# Patient Record
Sex: FEMALE | Race: WHITE | NOT HISPANIC OR LATINO | URBAN - METROPOLITAN AREA
[De-identification: names, ages, dates, MRNs, and addresses within clinical notes are randomized per-mention and may not be internally consistent; named-entity substitution may affect disease eponyms.]

---

## 2020-12-04 ENCOUNTER — NURSE TRIAGE (OUTPATIENT)
Dept: OTHER | Facility: OTHER | Age: 25
End: 2020-12-04

## 2020-12-04 DIAGNOSIS — Z20.822 SUSPECTED COVID-19 VIRUS INFECTION: Primary | ICD-10-CM

## 2020-12-04 DIAGNOSIS — Z20.822 SUSPECTED COVID-19 VIRUS INFECTION: ICD-10-CM

## 2020-12-04 PROCEDURE — U0003 INFECTIOUS AGENT DETECTION BY NUCLEIC ACID (DNA OR RNA); SEVERE ACUTE RESPIRATORY SYNDROME CORONAVIRUS 2 (SARS-COV-2) (CORONAVIRUS DISEASE [COVID-19]), AMPLIFIED PROBE TECHNIQUE, MAKING USE OF HIGH THROUGHPUT TECHNOLOGIES AS DESCRIBED BY CMS-2020-01-R: HCPCS | Performed by: FAMILY MEDICINE

## 2020-12-05 LAB — SARS-COV-2 RNA SPEC QL NAA+PROBE: NOT DETECTED

## 2022-03-10 LAB
EXTERNAL HEPATITIS B SURFACE ANTIGEN: NEGATIVE
EXTERNAL HIV-1/2 AB-AG: NORMAL
EXTERNAL RUBELLA IGG QUANTITATION: NORMAL
EXTERNAL SYPHILIS RPR SCREEN: NORMAL

## 2022-04-26 ENCOUNTER — OFFICE VISIT (OUTPATIENT)
Dept: PODIATRY | Facility: CLINIC | Age: 27
End: 2022-04-26
Payer: COMMERCIAL

## 2022-04-26 VITALS — HEART RATE: 67 BPM | SYSTOLIC BLOOD PRESSURE: 120 MMHG | WEIGHT: 214 LBS | DIASTOLIC BLOOD PRESSURE: 75 MMHG

## 2022-04-26 DIAGNOSIS — M79.671 RIGHT FOOT PAIN: ICD-10-CM

## 2022-04-26 DIAGNOSIS — M72.2 PLANTAR FASCIITIS: Primary | ICD-10-CM

## 2022-04-26 PROCEDURE — 99202 OFFICE O/P NEW SF 15 MIN: CPT | Performed by: PODIATRIST

## 2022-04-26 NOTE — PROGRESS NOTES
Assessment/Plan:    Explained the patient that her symptoms are most consistent with plantar fasciitis of the right foot  Discussed treatment options in light of her pregnancy  Advised her to refrain from walking barefoot  Stretching exercises of the right foot recommended  Dispensed heel supports  If pain persists, she will call and consider physical therapy  No problem-specific Assessment & Plan notes found for this encounter  Diagnoses and all orders for this visit:    Plantar fasciitis    Right foot pain    Other orders  -     Prenatal Vit-Fe Fumarate-FA (PRENATAL VITAMIN PO); Take by mouth          Subjective:      Patient ID: Iris Dykes is a 27 y o  female  HPI     Patient, a 74-year-old female presents with right heel pain  Patient is 15 weeks pregnant  Approximately 4 weeks ago, when patient for scheduled she states that her pain was severe  Now, she rates it a 3/10  She states that she is not on her feet as much as she had been attributing the decrease in pain to activity  The following portions of the patient's history were reviewed and updated as appropriate: allergies, current medications, past family history, past medical history, past social history, past surgical history and problem list     Review of Systems   Constitutional:        Patient is pregnant   Gastrointestinal: Negative  Musculoskeletal: Negative  Psychiatric/Behavioral: Negative  Objective:      /75   Pulse 67   Wt 97 1 kg (214 lb)          Physical Exam  Constitutional:       Appearance: Normal appearance  Cardiovascular:      Pulses: Normal pulses  Musculoskeletal:         General: Tenderness present  Comments: Pain with palpation medial aspect right heel at fascia insertion into calcaneus  No pain with side to side pressure right heel  Skin:     General: Skin is warm  Neurological:      General: No focal deficit present        Mental Status: She is oriented to person, place, and time

## 2022-05-06 ENCOUNTER — INITIAL PRENATAL (OUTPATIENT)
Dept: OBGYN CLINIC | Facility: CLINIC | Age: 27
End: 2022-05-06

## 2022-05-06 VITALS
BODY MASS INDEX: 36.15 KG/M2 | WEIGHT: 217 LBS | DIASTOLIC BLOOD PRESSURE: 78 MMHG | HEIGHT: 65 IN | SYSTOLIC BLOOD PRESSURE: 118 MMHG

## 2022-05-06 DIAGNOSIS — Z13.71 GENETIC DISEASE CARRIER STATUS TESTING, FEMALE: Primary | ICD-10-CM

## 2022-05-06 DIAGNOSIS — Z3A.16 16 WEEKS GESTATION OF PREGNANCY: ICD-10-CM

## 2022-05-06 DIAGNOSIS — G44.229 CHRONIC TENSION-TYPE HEADACHE, NOT INTRACTABLE: ICD-10-CM

## 2022-05-06 PROCEDURE — G0145 SCR C/V CYTO,THINLAYER,RESCR: HCPCS | Performed by: PHYSICIAN ASSISTANT

## 2022-05-06 PROCEDURE — 87491 CHLMYD TRACH DNA AMP PROBE: CPT | Performed by: PHYSICIAN ASSISTANT

## 2022-05-06 PROCEDURE — 87591 N.GONORRHOEAE DNA AMP PROB: CPT | Performed by: PHYSICIAN ASSISTANT

## 2022-05-06 PROCEDURE — PNV: Performed by: PHYSICIAN ASSISTANT

## 2022-05-06 RX ORDER — BUTALBITAL, ACETAMINOPHEN AND CAFFEINE 50; 325; 40 MG/1; MG/1; MG/1
1 TABLET ORAL EVERY 6 HOURS PRN
Qty: 15 TABLET | Refills: 0 | Status: SHIPPED | OUTPATIENT
Start: 2022-05-06

## 2022-05-06 NOTE — PROGRESS NOTES
NOB: Transfer from [facility], did not have physical exam done yet  PAP & Cx done, consents signed, Had negative NIPT  Information for [facility] given to schedule 20 wk ultrasound, patient may chose to stay with [facility]  Reports occasional nausea and vomiting  Reports headaches, had chronic headaches prior to pregnancy  Reports has been worsening in the last two weeks  Has had persistent headache for the last two weeks  Denies visual changes  Takes Tylenol, helps minimally  Reviewed tylenol, rest, hydration, magnsium, riboflavin  Offered Fioricet, aware to not take more than twice a week to avoid risk of rebound headaches  Patient would like script just in case  Reports mild cramping, round ligament in nature  No FM, VB, LOF, edema, domestic violence, or smoking  Tolerating PNV  Reviewed ob lab results with patient  BFA done today, patient is planning on breastfeeding  Continue routine prenatal care  Return to office in 4 weeks for ob check

## 2022-05-07 LAB
C TRACH DNA SPEC QL NAA+PROBE: NEGATIVE
N GONORRHOEA DNA SPEC QL NAA+PROBE: NEGATIVE

## 2022-05-14 LAB
LAB AP GYN PRIMARY INTERPRETATION: NORMAL
Lab: NORMAL

## 2022-06-08 ENCOUNTER — ROUTINE PRENATAL (OUTPATIENT)
Dept: OBGYN CLINIC | Facility: CLINIC | Age: 27
End: 2022-06-08

## 2022-06-08 ENCOUNTER — TELEPHONE (OUTPATIENT)
Dept: OBGYN CLINIC | Facility: CLINIC | Age: 27
End: 2022-06-08

## 2022-06-08 VITALS
HEIGHT: 65 IN | DIASTOLIC BLOOD PRESSURE: 78 MMHG | BODY MASS INDEX: 36.89 KG/M2 | SYSTOLIC BLOOD PRESSURE: 122 MMHG | WEIGHT: 221.4 LBS

## 2022-06-08 DIAGNOSIS — Z34.92 SECOND TRIMESTER PREGNANCY: Primary | ICD-10-CM

## 2022-06-08 PROCEDURE — PNV: Performed by: PHYSICIAN ASSISTANT

## 2022-06-08 NOTE — PROGRESS NOTES
Pt feels well  + Fm, no vb/lof, no n/v, no edema, no smoking, occasional headaches  Pt had normal anatomy scan although some cranial views missed-plans f/u w [facility] and 34 week growth scan as well  Declines desire for AFP testing  Pt states she has previously had seizure activity after blood draws  Will plan 28 weeks labs brunner diabetes screening     Urine neg/neg

## 2022-06-16 NOTE — TELEPHONE ENCOUNTER
R/s to 7/15  Patient unable to do 7/13 because she works until 10pm that day  Apologized to patient as I was unaware  Moved patient to a spot that should be cleared prior to apt

## 2022-07-15 ENCOUNTER — ROUTINE PRENATAL (OUTPATIENT)
Dept: OBGYN CLINIC | Facility: CLINIC | Age: 27
End: 2022-07-15

## 2022-07-15 VITALS
SYSTOLIC BLOOD PRESSURE: 134 MMHG | DIASTOLIC BLOOD PRESSURE: 72 MMHG | HEIGHT: 65 IN | BODY MASS INDEX: 37.82 KG/M2 | WEIGHT: 227 LBS

## 2022-07-15 DIAGNOSIS — Z3A.26 PREGNANCY WITH 26 COMPLETED WEEKS GESTATION: Primary | ICD-10-CM

## 2022-07-15 PROCEDURE — PNV: Performed by: OBSTETRICS & GYNECOLOGY

## 2022-07-15 NOTE — PROGRESS NOTES
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

## 2022-07-27 ENCOUNTER — HOSPITAL ENCOUNTER (OUTPATIENT)
Facility: HOSPITAL | Age: 27
Discharge: HOME | End: 2022-07-27
Attending: STUDENT IN AN ORGANIZED HEALTH CARE EDUCATION/TRAINING PROGRAM | Admitting: STUDENT IN AN ORGANIZED HEALTH CARE EDUCATION/TRAINING PROGRAM
Payer: COMMERCIAL

## 2022-07-27 ENCOUNTER — HOSPITAL ENCOUNTER (OUTPATIENT)
Facility: HOSPITAL | Age: 27
End: 2022-07-27
Admitting: STUDENT IN AN ORGANIZED HEALTH CARE EDUCATION/TRAINING PROGRAM
Payer: COMMERCIAL

## 2022-07-27 ENCOUNTER — NURSE TRIAGE (OUTPATIENT)
Dept: OTHER | Facility: OTHER | Age: 27
End: 2022-07-27

## 2022-07-27 VITALS
DIASTOLIC BLOOD PRESSURE: 59 MMHG | OXYGEN SATURATION: 98 % | SYSTOLIC BLOOD PRESSURE: 117 MMHG | TEMPERATURE: 98.1 F | HEART RATE: 83 BPM | RESPIRATION RATE: 18 BRPM

## 2022-07-27 LAB
BILIRUB UR QL STRIP: NEGATIVE
CLARITY UR: CLEAR
COLOR UR: YELLOW
GLUCOSE UR STRIP-MCNC: NEGATIVE MG/DL
HGB UR QL STRIP.AUTO: ABNORMAL
KETONES UR STRIP-MCNC: NEGATIVE MG/DL
LEUKOCYTE ESTERASE UR QL STRIP: NEGATIVE
NITRITE UR QL STRIP: NEGATIVE
PH UR STRIP.AUTO: 6.5 [PH] (ref 4.5–8)
PROT UR STRIP-MCNC: NEGATIVE MG/DL
SP GR UR STRIP.AUTO: <=1.005 (ref 1–1.03)
UROBILINOGEN UR QL STRIP.AUTO: 0.2 E.U./DL

## 2022-07-27 PROCEDURE — NC001 PR NO CHARGE: Performed by: STUDENT IN AN ORGANIZED HEALTH CARE EDUCATION/TRAINING PROGRAM

## 2022-07-27 PROCEDURE — 76817 TRANSVAGINAL US OBSTETRIC: CPT

## 2022-07-27 PROCEDURE — 76815 OB US LIMITED FETUS(S): CPT

## 2022-07-27 PROCEDURE — 99213 OFFICE O/P EST LOW 20 MIN: CPT

## 2022-07-27 NOTE — TELEPHONE ENCOUNTER
Reached out to on call provider for patient report of mild mid lower abdominal pain with blood inpatietn to  underwear, paper, and toilet post voiding  Has not felt baby move since resting this afternoon  On all advised patient to go to [facility] for [facility]  Patient advised and said she would be there around 8 PM  [facility] Charge Rn L&D notified of patient's symptoms and arrival time  Reason for Disposition   [1] Pregnant 24-36 weeks () AND [2] pinkish or brownish mucous discharge    Answer Assessment - Initial Assessment Questions  1  ONSET: "When did this bleeding start?"         22    2  DESCRIPTION: "Describe the bleeding that you are having " "How much bleeding is there?"     - SPOTTING: spotting, or pinkish / brownish mucous discharge; does not fill panti-liner or pad     - MILD:  less than 1 pad / hour; less than patient's usual menstrual bleeding    - MODERATE: 1-2 pads / hour; 1 menstrual cup every 6 hours; small-medium blood clots (e g , pea, grape, small coin)    - SEVERE: soaking 2 or more pads/hour for 2 or more hours; 1 menstrual cup every 2 hours; bleeding not contained by pads or continuous red blood from vagina; large blood clots (e g , golf ball, large coin)       Mild; little on underwear, on paper, and in the toilet    3  ABDOMINAL PAIN SEVERITY: If present, ask: "How bad is it?"  (e g , Scale 1-10; mild, moderate, or severe)    - MILD (1-3): doesn't interfere with normal activities, abdomen soft and not tender to touch     - MODERATE (4-7): interferes with normal activities or awakens from sleep, tender to touch     - SEVERE (8-10): excruciating pain, doubled over, unable to do any normal activities      Mild lower mid to left abdominal pain     4  PREGNANCY: "Do you know how many weeks or months pregnant you are?"       28w 4 days pregnant    5  FREDY: "What date are you expecting to deliver?"      10/15/22    6   FETAL MOVEMENT: "Has the baby's movement decreased or changed significantly from normal?"      No movement since going to [place] 15 minutes ago; felt no movement while resting this afternoon post house cleaning    7  HEMODYNAMIC STATUS: "Are you weak or feeling lightheaded?" If Yes, ask: "Can you stand and walk normally?"       No, just had a headache earlier; resolved    8   OTHER SYMPTOMS: "What other symptoms are you having with the bleeding?" (e g , leaking fluid from vagina, contractions)      Denies    Protocols used: PREGNANCY - VAGINAL BLEEDING GREATER THAN 20 WEEKS [id number]

## 2022-07-28 ENCOUNTER — APPOINTMENT (OUTPATIENT)
Dept: LAB | Facility: CLINIC | Age: 27
End: 2022-07-28
Payer: COMMERCIAL

## 2022-07-28 DIAGNOSIS — Z3A.26 PREGNANCY WITH 26 COMPLETED WEEKS GESTATION: ICD-10-CM

## 2022-07-28 LAB
BACTERIA UR QL AUTO: ABNORMAL /HPF
BASOPHILS # BLD AUTO: 0.06 THOUSANDS/ΜL (ref 0–0.1)
BASOPHILS NFR BLD AUTO: 1 % (ref 0–1)
EOSINOPHIL # BLD AUTO: 0.19 THOUSAND/ΜL (ref 0–0.61)
EOSINOPHIL NFR BLD AUTO: 2 % (ref 0–6)
ERYTHROCYTE [DISTWIDTH] IN BLOOD BY AUTOMATED COUNT: 13.2 % (ref 11.6–15.1)
GLUCOSE 1H P 50 G GLC PO SERPL-MCNC: 83 MG/DL (ref 40–134)
HCT VFR BLD AUTO: 38.1 % (ref 34.8–46.1)
HGB BLD-MCNC: 12.9 G/DL (ref 11.5–15.4)
IMM GRANULOCYTES # BLD AUTO: 0.07 THOUSAND/UL (ref 0–0.2)
IMM GRANULOCYTES NFR BLD AUTO: 1 % (ref 0–2)
LYMPHOCYTES # BLD AUTO: 1.42 THOUSANDS/ΜL (ref 0.6–4.47)
LYMPHOCYTES NFR BLD AUTO: 14 % (ref 14–44)
MCH RBC QN AUTO: 30 PG (ref 26.8–34.3)
MCHC RBC AUTO-ENTMCNC: 33.9 G/DL (ref 31.4–37.4)
MCV RBC AUTO: 89 FL (ref 82–98)
MONOCYTES # BLD AUTO: 0.94 THOUSAND/ΜL (ref 0.17–1.22)
MONOCYTES NFR BLD AUTO: 9 % (ref 4–12)
NEUTROPHILS # BLD AUTO: 7.84 THOUSANDS/ΜL (ref 1.85–7.62)
NEUTS SEG NFR BLD AUTO: 73 % (ref 43–75)
NON-SQ EPI CELLS URNS QL MICRO: ABNORMAL /HPF
NRBC BLD AUTO-RTO: 0 /100 WBCS
PLATELET # BLD AUTO: 272 THOUSANDS/UL (ref 149–390)
PMV BLD AUTO: 10.4 FL (ref 8.9–12.7)
RBC # BLD AUTO: 4.3 MILLION/UL (ref 3.81–5.12)
RBC #/AREA URNS AUTO: ABNORMAL /HPF
RPR SER QL: NORMAL
WBC # BLD AUTO: 10.52 THOUSAND/UL (ref 4.31–10.16)
WBC #/AREA URNS AUTO: ABNORMAL /HPF

## 2022-07-28 PROCEDURE — 36415 COLL VENOUS BLD VENIPUNCTURE: CPT

## 2022-07-28 PROCEDURE — 85025 COMPLETE CBC W/AUTO DIFF WBC: CPT

## 2022-07-28 PROCEDURE — 82950 GLUCOSE TEST: CPT

## 2022-07-28 PROCEDURE — 81001 URINALYSIS AUTO W/SCOPE: CPT

## 2022-07-28 PROCEDURE — 86592 SYPHILIS TEST NON-TREP QUAL: CPT

## 2022-07-28 NOTE — PROGRESS NOTES
L&D Triage Note - OB/GYN  Miryam Grove 27 y o  female MRN: [id number]  Unit/Bed#: LD TRIAGE 3 Encounter: [id number]      ASSESSMENT:    Miryam Grove is a 27 y o   at 28w4d presenting for vaginal spotting, decreased fetal movement    PLAN:    1) r/o PTL, vaginal bleeding  - Speculum showed small amount of white vaginal discharge  No gross bleeding or pooling noted  - Ntrazine negative  No ferning, clue cells, hyphae, trichomonads seen on microscopy  - TVUS showed cervical length 3 18 cm  - Reports no hematuria, vaginal spotting or bleeding since arrival in [facility]  - /59 in triage    - Headache not present at time of examination  - Stable for discharge at this time    2) Decreased fetal movement  - Reactive NST  - CARA 13 4 cm  - Pt notes significant increase in fetal movement while resting in triage    Discharge Instructions:   Continue routine prenatal care  [place] from [place]age with  labor precautions    - Reviewed rupture of membranes, false vs true labor, decreased fetal movement, and vaginal bleeding  Pt counseled on placental abruption signs and symptoms  Additional educational information was provided in after visit summary    - Pt to call provider with any concerns or worsening vaginal bleeding or severe abdominal pain and follow up at her next scheduled prenatal appointment on 22 at [phone]    - Case discussed with Dr Foster Caraballo:    Miryam Grove 27 y o  Silas Balderas at 28w4d with an Estimated Date of Delivery: 10/15/22     Pt reports a small amount of vaginal spotting earlier today after being on her feet and doing chores all day  She notes small amount of red blood in toilet  And with wipingafter voiding around 1700 and again at [address]  She could not differentiate if blood was from urine or vagina  Upon evaluation in triage later in the evening, pt reports no bleeding since 1930  Denies any contractions, leakage of fluids, vaginal discharge   Reports some decreased fetal movement this afternoon  She did not perform kick counts  She     ROS:   Admits to right sided temporal headache 5/10  Does not radiate  Mitigated with rest and PO hydration  Reports hx of chronic migraines, prescribed Fioricet  Also reports one episode of lightheaded dizziness after standing in one place for too long- Resolved with rest    Denies fever, chills, vision changes, chest pain, shortness of breath, abdominal pain, dysuria, diarrhea, back pain, calf pain or any other complaints at this time  Her obstetrical history is significant for SABx2 (2017, 2019)    OBJECTIVE:    Vitals:    07/27/22 2010   BP: 150/83   Pulse: 89   Resp: 18   Temp: 97 8 °F (36 6 °C)   SpO2: 98%     General Physical Exam:  General: in no apparent distress, non-toxic, alert and afebrile  Cardiovascular: Cor RRR and No murmurs  Lungs: non-labored breathing, CTA b/l  Abdomen: abdomen is soft without significant tenderness, masses, organomegaly or guarding  Lower extremeties: nontender    Cervical Exam  Speculum: Cervical os is closed  No visible pooling or vaginal bleeding   Small amount of white discharge in vaginal introitus and the external cervical noted    Fetal monitoring:  FHT:  135 bpm/ Moderate 6 - 25 bpm / 10 x 10 accelerations present, no decelerations  [place]: contractions not present     KOH/WTMT:     Infection:   - no clue cells    - no hyphae   - no trichomonads present    Membrane status   - no ferning   - negative Nitrazine   - no pooling     Urine Dip    - Urine dipstick shows negative for all components, positive for mdoerate blood, negative for all other components      Imaging:       TVUS   - Cervical length    - 3 2 cm    - 3 05 cm    - 3 18 cm        Abd  US   CARA      - Q1 2 83cm     - Q2 3 46cm     - Q3 3 03cm     - Q4 4 05cm     - Total: 13 9cm      Marek Jett, DO  OBGYN PGY-1  7/27/2022 9:38 PM

## 2022-07-28 NOTE — PROCEDURES
Tin Pillai, a [id number] at 28w5d with an FREDY of 10/15/2022, by Last Menstrual Period, was seen at [address] for the following procedure(s): $Procedure Type: CARA, US - Transvaginal]             4 Quadrant CARA  CARA Q1 (cm): 4 1 cm  CARA Q2 (cm): 3 cm  CARA Q3 (cm): 3 5 cm  CARA Q4 (cm): 2 8 cm  CARA TOTAL (cm): 13 4 cm             Ultrasound Other  Cervical Length: 3 18         Basilia Cottrell DO  07/28/22  7:25 AM

## 2022-08-01 ENCOUNTER — ROUTINE PRENATAL (OUTPATIENT)
Dept: OBGYN CLINIC | Facility: CLINIC | Age: 27
End: 2022-08-01

## 2022-08-01 VITALS — DIASTOLIC BLOOD PRESSURE: 80 MMHG | BODY MASS INDEX: 38.61 KG/M2 | WEIGHT: 232 LBS | SYSTOLIC BLOOD PRESSURE: 130 MMHG

## 2022-08-01 DIAGNOSIS — Z3A.29 29 WEEKS GESTATION OF PREGNANCY: Primary | ICD-10-CM

## 2022-08-01 DIAGNOSIS — Z34.93 PRENATAL CARE, THIRD TRIMESTER: ICD-10-CM

## 2022-08-01 PROCEDURE — PNV: Performed by: STUDENT IN AN ORGANIZED HEALTH CARE EDUCATION/TRAINING PROGRAM

## 2022-08-01 NOTE — PROGRESS NOTES
Fidelina Armas is a 27 y o  [id number] 29w2d  Reports ++FM, no LOF, VB, or contractions       Vitals:    08/01/22 1600   BP: 130/80   S=D  +FHTs    A/P:  Third tri labs wnl  Rh status A POS    TDAP vaccine--will consider  Breastfeeding: breast, pump ordered    Discussed pre-term labor precautions  Return to office in 2 weeks

## 2022-08-01 NOTE — PATIENT INSTRUCTIONS
Pregnancy at 32 to 30 Weeks   AMBULATORY CARE:   What changes are happening to your body: You may notice new symptoms such as shortness of breath, heartburn, or swelling of your ankles and feet  You may also have trouble sleeping or contractions  Seek care immediately if:   You develop a severe headache that does not go away  You have new or increased vision changes, such as blurred or spotted vision  You have new or increased swelling in your face or hands  You have vaginal spotting or bleeding  Your water broke or you feel warm water gushing or trickling from your vagina  Call your doctor or obstetrician if:   You have more than 5 contractions in 1 hour  You notice any changes in your baby's movements  You have abdominal cramps, pressure, or tightening  You have a change in vaginal discharge  You have chills or a fever  You have vaginal itching, burning, or pain  You have yellow, green, white, or foul-smelling vaginal discharge  You have pain or burning when you urinate, less urine than usual, or pink or bloody urine  You have questions or concerns about your condition or care  How to care for yourself at this stage of your pregnancy:       Eat a variety of healthy foods  Healthy foods include fruits, vegetables, whole-grain breads, low-fat dairy foods, beans, lean meats, and fish  Drink liquids as directed  Ask how much liquid to drink each day and which liquids are best for you  Limit caffeine to less than 200 milligrams each day  Limit your intake of fish to 2 servings each week  Choose fish low in mercury such as canned light tuna, shrimp, salmon, cod, or tilapia  Do not  eat fish high in mercury such as swordfish, tilefish, adrianne mackerel, and shark  Manage heartburn  by eating 4 or 5 small meals each day instead of large meals  Avoid spicy food  Manage swelling  by lying down and putting your feet up  Take prenatal vitamins as directed  Your need for certain vitamins and minerals, such as folic acid, increases during pregnancy  Prenatal vitamins provide some of the extra vitamins and minerals you need  Prenatal vitamins may also help to decrease the risk of certain birth defects  Talk to your healthcare provider about exercise  Moderate exercise can help you stay fit  Your healthcare provider will help you plan an exercise program that is safe for you during pregnancy  Do not smoke  Smoking increases your risk of a miscarriage and other health problems during your pregnancy  Smoking can cause your baby to be born too early or weigh less at birth  Ask your healthcare provider for information if you need help quitting  Do not drink alcohol  Alcohol passes from your body to your baby through the placenta  It can affect your baby's brain development and cause fetal alcohol syndrome (FAS)  FAS is a group of conditions that causes mental, behavior, and growth problems  Talk to your healthcare provider before you take any medicines  Many medicines may harm your baby if you take them when you are pregnant  Do not take any medicines, vitamins, herbs, or supplements without first talking to your healthcare provider  Never use illegal or street drugs (such as marijuana or cocaine) while you are pregnant  Safety tips during pregnancy:   Avoid hot tubs and saunas  Do not use a hot tub or sauna while you are pregnant, especially during your first trimester  Hot tubs and saunas may raise your baby's temperature and increase the risk of birth defects  Avoid toxoplasmosis  This is an infection caused by eating raw meat or being around infected cat feces  It can cause birth defects, miscarriages, and other problems  Wash your hands after you touch raw meat  Make sure any meat is well-cooked before you eat it  Avoid raw eggs and unpasteurized milk   Use gloves or ask someone else to clean your cat's litter box while you are pregnant  Changes that are happening with your baby:  By 30 weeks, your baby may weigh more than 3 pounds  Your baby may be about 11 inches long from the top of the head to the rump (baby's bottom)  Your baby's eyes open and close now  Your baby's kicks and movements are more forceful at this time  What you need to know about prenatal care: Your healthcare provider will check your blood pressure and weight  You may also need the following:  Blood tests  may be done to check for anemia or blood type  A urine test  may also be done to check for sugar and protein  These can be signs of gestational diabetes or infection  Protein in your urine may also be a sign of preeclampsia  Preeclampsia is a condition that can develop during week 20 or later of your pregnancy  It causes high blood pressure, and it can cause problems with your kidneys and other organs  A Tdap vaccine and flu vaccine  may be recommended by your healthcare provider  A gestational diabetes screen  may be done  Your healthcare provider may order either a 1-step or 2-step oral glucose tolerance test (OGTT)  1-step OGTT:  Your blood sugar level will be tested after you have not eaten for 8 hours (fasting)  You will then be given a glucose drink  Your level will be tested again 1 hour and 2 hours after you finish the drink  2-step OGTT:  You do not have to fast for the first part of the test  You will have the glucose drink at any time of day  Your blood sugar level will be checked 1 hour later  If your blood sugar is higher than a certain level, another test will be ordered  You will fast and your blood sugar level will be tested  You will have the glucose drink  Your blood will be tested again 1 hour, 2 hours, and 3 hours after you finish the glucose drink  Fundal height  is a measurement of your uterus to check your baby's growth  This number is usually the same as the number of weeks that you have been pregnant   Your healthcare provider may also check your baby's position  Your baby's heart rate  will be checked  Follow up with your doctor or obstetrician as directed:  Write down your questions so you remember to ask them during your visits  © Copyright [employer] 2022 Information is for End User's use only and may not be sold, redistributed or otherwise used for commercial purposes  All illustrations and images included in CareNotes® are the copyrighted property of A D A M , Inc  or [facility] Marah Gunter   The above information is an  only  It is not intended as medical advice for individual conditions or treatments  Talk to your doctor, nurse or pharmacist before following any medical regimen to see if it is safe and effective for you

## 2022-08-13 ENCOUNTER — NURSE TRIAGE (OUTPATIENT)
Dept: OTHER | Facility: OTHER | Age: 27
End: 2022-08-13

## 2022-08-13 NOTE — TELEPHONE ENCOUNTER
Reason for Disposition   Back pain    Answer Assessment - Initial Assessment Questions  1  ONSET: "When did the pain begin?"       Today since 5 am    2  LOCATION: "Where does it hurt?" (upper, mid or lower back)      Lower back    3  SEVERITY: "How bad is the pain?"  (e g , Scale 1-10; mild, moderate, or severe)    - MILD (1-3): doesn't interfere with normal activities     - MODERATE (4-7): interferes with normal activities or awakens from sleep     - SEVERE (8-10): excruciating pain, unable to do any normal activities       2/10 right now  Pain was severe when it started but has significantly decreased since that time  4  PATTERN: "Is the pain constant?" (e g , yes, no; constant, intermittent)       Constant since the morning  5  RADIATION: "Does the pain shoot into your legs or elsewhere?"      No radiating pain today  6  CAUSE:  "What do you think is causing the back pain?"       Unknown    7  BACK OVERUSE:  "Any recent lifting of heavy objects, strenuous work or exercise?"      Nothing strenuous    8  MEDICATIONS: "What have you taken so far for the pain?" (e g , nothing, acetaminophen)      Has not taken anything  9  NEUROLOGIC SYMPTOMS: "Do you have any weakness, numbness, or problems with bowel/bladder control?"      n/a  10  OTHER SYMPTOMS: "Do you have any other symptoms?" (e g , fever, abdominal pain, burning with urination, blood in urine, fluid leaking from vagina)        No vaginal bleeding or fluid leakage  Reports normal fetal movement  Baby just moved a few mins ago  No other symptoms       11  FREDY: "What date are you expecting to deliver?"        October 2022    Protocols used: PREGNANCY - BACK PAIN-ADULT-

## 2022-08-13 NOTE — TELEPHONE ENCOUNTER
Regarding: Pregnant/ Extreme back pain and nausea  ----- Message from Praful Bales sent at 8/13/2022  7:47 AM EDT -----  "I have had extreme back pain, and nausea    I am 31 weeks today "

## 2022-08-19 ENCOUNTER — TELEPHONE (OUTPATIENT)
Dept: OBGYN CLINIC | Facility: CLINIC | Age: 27
End: 2022-08-19

## 2022-08-19 ENCOUNTER — HOSPITAL ENCOUNTER (OUTPATIENT)
Facility: HOSPITAL | Age: 27
Discharge: HOME | End: 2022-08-19
Attending: OBSTETRICS & GYNECOLOGY | Admitting: OBSTETRICS & GYNECOLOGY
Payer: COMMERCIAL

## 2022-08-19 ENCOUNTER — ROUTINE PRENATAL (OUTPATIENT)
Dept: OBGYN CLINIC | Facility: CLINIC | Age: 27
End: 2022-08-19

## 2022-08-19 VITALS
HEART RATE: 85 BPM | SYSTOLIC BLOOD PRESSURE: 123 MMHG | DIASTOLIC BLOOD PRESSURE: 65 MMHG | RESPIRATION RATE: 18 BRPM | TEMPERATURE: 98.5 F

## 2022-08-19 VITALS — DIASTOLIC BLOOD PRESSURE: 88 MMHG | BODY MASS INDEX: 38.77 KG/M2 | SYSTOLIC BLOOD PRESSURE: 146 MMHG | WEIGHT: 233 LBS

## 2022-08-19 DIAGNOSIS — Z3A.31 31 WEEKS GESTATION OF PREGNANCY: Primary | ICD-10-CM

## 2022-08-19 DIAGNOSIS — O13.3 GESTATIONAL HYPERTENSION, THIRD TRIMESTER: ICD-10-CM

## 2022-08-19 PROBLEM — Z86.69 HX OF MIGRAINES: Status: ACTIVE | Noted: 2022-08-19

## 2022-08-19 LAB
ALBUMIN SERPL BCP-MCNC: 3.6 G/DL (ref 3.5–5)
ALP SERPL-CCNC: 84 U/L (ref 34–104)
ALT SERPL W P-5'-P-CCNC: 9 U/L (ref 7–52)
ANION GAP SERPL CALCULATED.3IONS-SCNC: 6 MMOL/L (ref 4–13)
AST SERPL W P-5'-P-CCNC: 12 U/L (ref 13–39)
BILIRUB SERPL-MCNC: 0.31 MG/DL (ref 0.2–1)
BUN SERPL-MCNC: 5 MG/DL (ref 5–25)
CALCIUM SERPL-MCNC: 9.6 MG/DL (ref 8.4–10.2)
CHLORIDE SERPL-SCNC: 105 MMOL/L (ref 96–108)
CO2 SERPL-SCNC: 23 MMOL/L (ref 21–32)
CREAT SERPL-MCNC: 0.57 MG/DL (ref 0.6–1.3)
CREAT UR-MCNC: 49.6 MG/DL
ERYTHROCYTE [DISTWIDTH] IN BLOOD BY AUTOMATED COUNT: 13 % (ref 11.6–15.1)
GFR SERPL CREATININE-BSD FRML MDRD: 130 ML/MIN/1.73SQ M
GLUCOSE SERPL-MCNC: 77 MG/DL (ref 65–140)
HCT VFR BLD AUTO: 38.8 % (ref 34.8–46.1)
HGB BLD-MCNC: 12.8 G/DL (ref 11.5–15.4)
MCH RBC QN AUTO: 28.8 PG (ref 26.8–34.3)
MCHC RBC AUTO-ENTMCNC: 33 G/DL (ref 31.4–37.4)
MCV RBC AUTO: 87 FL (ref 82–98)
PLATELET # BLD AUTO: 287 THOUSANDS/UL (ref 149–390)
PMV BLD AUTO: 10.4 FL (ref 8.9–12.7)
POTASSIUM SERPL-SCNC: 4.1 MMOL/L (ref 3.5–5.3)
PROT SERPL-MCNC: 6.8 G/DL (ref 6.4–8.4)
PROT UR-MCNC: 7 MG/DL
PROT/CREAT UR: 0.14 MG/G{CREAT} (ref 0–0.1)
RBC # BLD AUTO: 4.45 MILLION/UL (ref 3.81–5.12)
SODIUM SERPL-SCNC: 134 MMOL/L (ref 135–147)
WBC # BLD AUTO: 13.25 THOUSAND/UL (ref 4.31–10.16)

## 2022-08-19 PROCEDURE — 96360 HYDRATION IV INFUSION INIT: CPT

## 2022-08-19 PROCEDURE — 96365 THER/PROPH/DIAG IV INF INIT: CPT

## 2022-08-19 PROCEDURE — 96361 HYDRATE IV INFUSION ADD-ON: CPT

## 2022-08-19 PROCEDURE — 84156 ASSAY OF PROTEIN URINE: CPT | Performed by: OBSTETRICS & GYNECOLOGY

## 2022-08-19 PROCEDURE — 80053 COMPREHEN METABOLIC PANEL: CPT | Performed by: OBSTETRICS & GYNECOLOGY

## 2022-08-19 PROCEDURE — 96366 THER/PROPH/DIAG IV INF ADDON: CPT

## 2022-08-19 PROCEDURE — 96374 THER/PROPH/DIAG INJ IV PUSH: CPT

## 2022-08-19 PROCEDURE — NC001 PR NO CHARGE: Performed by: OBSTETRICS & GYNECOLOGY

## 2022-08-19 PROCEDURE — 99214 OFFICE O/P EST MOD 30 MIN: CPT | Performed by: OBSTETRICS & GYNECOLOGY

## 2022-08-19 PROCEDURE — 99213 OFFICE O/P EST LOW 20 MIN: CPT

## 2022-08-19 PROCEDURE — 85027 COMPLETE CBC AUTOMATED: CPT | Performed by: OBSTETRICS & GYNECOLOGY

## 2022-08-19 PROCEDURE — 82570 ASSAY OF URINE CREATININE: CPT | Performed by: OBSTETRICS & GYNECOLOGY

## 2022-08-19 PROCEDURE — PNV: Performed by: STUDENT IN AN ORGANIZED HEALTH CARE EDUCATION/TRAINING PROGRAM

## 2022-08-19 RX ORDER — METOCLOPRAMIDE 10 MG/1
10 TABLET ORAL ONCE
Status: COMPLETED | OUTPATIENT
Start: 2022-08-19 | End: 2022-08-19

## 2022-08-19 RX ORDER — METOCLOPRAMIDE HYDROCHLORIDE 5 MG/ML
10 INJECTION INTRAMUSCULAR; INTRAVENOUS EVERY 6 HOURS PRN
Status: DISCONTINUED | OUTPATIENT
Start: 2022-08-19 | End: 2022-08-19 | Stop reason: HOSPADM

## 2022-08-19 RX ORDER — MAGNESIUM SULFATE HEPTAHYDRATE 40 MG/ML
2 INJECTION, SOLUTION INTRAVENOUS ONCE
Status: COMPLETED | OUTPATIENT
Start: 2022-08-19 | End: 2022-08-19

## 2022-08-19 RX ORDER — ACETAMINOPHEN 325 MG/1
975 TABLET ORAL EVERY 6 HOURS PRN
Status: DISCONTINUED | OUTPATIENT
Start: 2022-08-19 | End: 2022-08-19 | Stop reason: HOSPADM

## 2022-08-19 RX ADMIN — METOCLOPRAMIDE 10 MG: 5 INJECTION, SOLUTION INTRAMUSCULAR; INTRAVENOUS at 15:26

## 2022-08-19 RX ADMIN — SODIUM CHLORIDE 500 MG: 0.9 INJECTION, SOLUTION INTRAVENOUS at 16:16

## 2022-08-19 RX ADMIN — ACETAMINOPHEN 975 MG: 325 TABLET ORAL at 12:59

## 2022-08-19 RX ADMIN — SODIUM CHLORIDE, SODIUM LACTATE, POTASSIUM CHLORIDE, AND CALCIUM CHLORIDE 500 ML: .6; .31; .03; .02 INJECTION, SOLUTION INTRAVENOUS at 15:25

## 2022-08-19 RX ADMIN — METOCLOPRAMIDE 10 MG: 10 TABLET ORAL at 12:59

## 2022-08-19 RX ADMIN — MAGNESIUM SULFATE HEPTAHYDRATE 2 G: 40 INJECTION, SOLUTION INTRAVENOUS at 15:33

## 2022-08-19 NOTE — Clinical Note
Yessenia Mejia is getting new diagnosis of gHTN as of today--will need weekly labs and testing and IOL at 37 weeks Thanks!

## 2022-08-19 NOTE — TELEPHONE ENCOUNTER
----- Message from Mare Monahan MD sent at 8/19/2022 11:57 AM EDT -----  Scottie Chayo is getting new diagnosis of gHTN as of today--will need weekly labs and testing and IOL at 37 weeks  Thanks!

## 2022-08-19 NOTE — DISCHARGE INSTRUCTIONS
Hypertension During Pregnancy   WHAT YOU NEED TO KNOW:   Hypertension is high blood pressure (BP)  Normal BP is 119/79 or lower  Hypertension during pregnancy is a BP of 140/90 or higher  Severe hypertension is at least 160/110  One or both numbers of these readings may be high  Hypertension may start before you become pregnant, or develop during pregnancy  Pregnancy can cause high BP, or it may develop because of other risk factors you had before you became pregnant  It is important to get screened and treated for an elevated BP or hypertension during pregnancy  This can prevent problems for you and your baby  DISCHARGE INSTRUCTIONS:   Call your local emergency number (910 in the ), or have someone else call if:   You have a seizure  You have chest pain  You faint or lose consciousness  You have any of the following signs of a heart attack:      Squeezing, pressure, or pain in your chest    You may  also have any of the following:     Discomfort or pain in your back, neck, jaw, stomach, or arm    Shortness of breath    Nausea or vomiting    Lightheadedness or a sudden cold sweat    Seek care immediately if:   You have a severe headache or vision loss  You have weakness in an arm or leg  You have fluid or blood leaking from your vagina that does not stop  You feel a gush of fluid from your vagina  You feel a change in your baby's movement, or you feel fewer than 6 to 10 movements in an hour  Call your doctor or obstetrician if:   You urinate less than usual or stop urinating  You have severe abdominal pain with or without nausea and vomiting  You have new or increased swelling in your face or hands, or sudden weight gain  You have questions or concerns about your condition or care  Medicines: You may need any of the following:  Medicine  may be given to lower your BP  The dose of current BP medicine you take may be changed      Daily low-dose aspirin  may be recommended if you are at high risk for preeclampsia  Aspirin may help prevent preeclampsia or problems it can cause  Do not take aspirin unless directed by your healthcare provider  Take your medicine as directed  Contact your healthcare provider if you think your medicine is not helping or if you have side effects  Tell him of her if you are allergic to any medicine  Keep a list of the medicines, vitamins, and herbs you take  Include the amounts, and when and why you take them  Bring the list or the pill bottles to follow-up visits  Carry your medicine list with you in case of an emergency  Manage hypertension during pregnancy:   Go to all scheduled appointments  Your healthcare providers will check your blood pressure and may order other tests  Rest as directed  Your healthcare provider may tell you to rest more often if you have mild symptoms of preeclampsia  Check your BP as directed if you have chronic hypertension  Sit and rest for 5 minutes before you take your BP  Extend your arm and support it on a flat surface  Your arm should be at the same level as your heart  Follow the directions that came with your BP monitor  Take your BP as often as directed  Keep a record of your BP readings and bring it to your follow-up visits  Do not drink alcohol or smoke  Alcohol, nicotine, and other chemicals in cigarettes and cigars can increase your BP  They can also harm your baby  Ask your healthcare provider for information if you currently drink alcohol or smoke and need help to quit  E-cigarettes or smokeless tobacco still contain nicotine  Talk to your healthcare provider before you use these products  Eat healthy foods  Healthy foods can help control your BP  Healthy foods include fruits, vegetables, whole-grain breads, low-fat dairy products, beans, lean meats, and fish  Ask if you need to be on a special diet  Exercise if directed  Exercise can help lower your BP   Ask your healthcare provider how much exercise you need and which exercise is right for you  Do kick counts as directed  You may need to keep track of how often your baby moves or kicks over a certain amount of time  Ask your obstetrician how to do kick counts and how often to do them  Check your weight each day  Weigh yourself every day before breakfast  Weight gain can be a sign of extra fluid in your body  Follow up with your doctor or obstetrician as directed:  Write down your questions so you remember to ask them during your visits  © Copyright [address] 2022 Information is for End User's use only and may not be sold, redistributed or otherwise used for commercial purposes  All illustrations and images included in CareNotes® are the copyrighted property of [employer]  or Formerly named [facility] Marah Gunter   The above information is an  only  It is not intended as medical advice for individual conditions or treatments  Talk to your doctor, nurse or pharmacist before following any medical regimen to see if it is safe and effective for you

## 2022-08-19 NOTE — PATIENT INSTRUCTIONS
Pregnancy at 31 to 34 Weeks   AMBULATORY CARE:   Changes happening with your body: You may continue to have symptoms such as shortness of breath, heartburn, contractions, or swelling of your ankles and feet  You may be gaining about 1 pound a week now  Seek care immediately if:   You develop a severe headache that does not go away  You have new or increased vision changes, such as blurred or spotted vision  You have new or increased swelling in your face or hands  You have vaginal spotting or bleeding  Your water broke or you feel warm water gushing or trickling from your vagina  Call your obstetrician if:   You have more than 5 contractions in 1 hour  You notice any changes in your baby's movements  You have abdominal cramps, pressure, or tightening  You have a change in vaginal discharge  You have chills or a fever  You have vaginal itching, burning, or pain  You have yellow, green, white, or foul-smelling vaginal discharge  You have pain or burning when you urinate, less urine than usual, or pink or bloody urine  You have questions or concerns about your condition or care  How to care for yourself at this stage of your pregnancy:       Eat a variety of healthy foods  Healthy foods include fruits, vegetables, whole-grain breads, low-fat dairy foods, beans, lean meats, and fish  Drink liquids as directed  Ask how much liquid to drink each day and which liquids are best for you  Limit caffeine to less than 200 milligrams each day  Limit your intake of fish to 2 servings each week  Choose fish low in mercury such as canned light tuna, shrimp, salmon, cod, or tilapia  Do not  eat fish high in mercury such as swordfish, tilefish, adrianne mackerel, and shark  Manage heartburn  by eating 4 or 5 small meals each day instead of large meals  Avoid spicy food  Manage swelling  by lying down and putting your feet up  Take prenatal vitamins as directed    Your need for certain vitamins and minerals, such as folic acid, increases during pregnancy  Prenatal vitamins provide some of the extra vitamins and minerals you need  Prenatal vitamins may also help to decrease the risk of certain birth defects  Talk to your healthcare provider about exercise  Moderate exercise can help you stay fit  Your healthcare provider will help you plan an exercise program that is safe for you during pregnancy  Do not smoke  Smoking increases your risk of a miscarriage and other health problems during your pregnancy  Smoking can cause your baby to be born too early or weigh less at birth  Ask your healthcare provider for information if you need help quitting  Do not drink alcohol  Alcohol passes from your body to your baby through the placenta  It can affect your baby's brain development and cause fetal alcohol syndrome (FAS)  FAS is a group of conditions that causes mental, behavior, and growth problems  Talk to your healthcare provider before you take any medicines  Many medicines may harm your baby if you take them when you are pregnant  Do not take any medicines, vitamins, herbs, or supplements without first talking to your healthcare provider  Never use illegal or street drugs (such as marijuana or cocaine) while you are pregnant  Safety tips during pregnancy:   Avoid hot tubs and saunas  Do not use a hot tub or sauna while you are pregnant, especially during your first trimester  Hot tubs and saunas may raise your baby's temperature and increase the risk of birth defects  Avoid toxoplasmosis  This is an infection caused by eating raw meat or being around infected cat feces  It can cause birth defects, miscarriages, and other problems  Wash your hands after you touch raw meat  Make sure any meat is well-cooked before you eat it  Avoid raw eggs and unpasteurized milk  Use gloves or ask someone else to clean your cat's litter box while you are pregnant  Changes happening with your baby:  By 34 weeks, your baby may weigh more than 5 pounds  Your baby will be about 12 ½ inches long from the top of the head to the rump (baby's bottom)  Your baby is gaining about ½ pound a week  Your baby's eyes open and close now  Your baby's kicks and movements are more forceful at this time  What you need to know about prenatal care: Your healthcare provider will check your blood pressure and weight  You may also need the following:  A urine test  may also be done to check for sugar and protein  These can be signs of gestational diabetes or infection  Protein in your urine may also be a sign of preeclampsia  Preeclampsia is a condition that can develop during week 20 or later of your pregnancy  It causes high blood pressure, and it can cause problems with your kidneys and other organs  A gestational diabetes screen  may be done  Your healthcare provider may order either a 1-step or 2-step oral glucose tolerance test (OGTT)  1-step OGTT:  Your blood sugar level will be tested after you have not eaten for 8 hours (fasting)  You will then be given a glucose drink  Your level will be tested again 1 hour and 2 hours after you finish the drink  2-step OGTT:  You do not have to fast for the first part of the test  You will have the glucose drink at any time of day  Your blood sugar level will be checked 1 hour later  If your blood sugar is higher than a certain level, another test will be ordered  You will fast and your blood sugar level will be tested  You will have the glucose drink  Your blood will be tested again 1 hour, 2 hours, and 3 hours after you finish the glucose drink  A Tdap vaccine  may be recommended by your healthcare provider  Fundal height  is a measurement of your uterus to check your baby's growth  This number is usually the same as the number of weeks that you have been pregnant   Your healthcare provider may also check your baby's position  Your baby's heart rate  will be checked  Follow up with your obstetrician as directed:  Write down your questions so you remember to ask them during your visits  © Copyright [employer] 2022 Information is for End User's use only and may not be sold, redistributed or otherwise used for commercial purposes  All illustrations and images included in CareNotes® are the copyrighted property of A [employer] Inc  or Sharron Gunter   The above information is an  only  It is not intended as medical advice for individual conditions or treatments  Talk to your doctor, nurse or pharmacist before following any medical regimen to see if it is safe and effective for you

## 2022-08-19 NOTE — PROGRESS NOTES
L&D Triage Note - OB/GYN  Matt Sánchez 27 y o  female MRN: [id number]  Unit/Bed#: LD TRIAGE  Encounter: [id number]      ASSESSMENT:    Matt Sánchez is a 27 y o   at [address] who presents with known diagnosis of gHTN and headache with concern for either Preeclampsia with severe features or migraine in the setting of gHTN  Headache improved significantly in triage and therefore the diagnosis of migraine in the setting of gHTN was made and patient was discharged home with strict return precautions  PLAN:    1) Blood pressure monitoring  - Systolic (91CLC), KDL:153 , Min:120 , FCK:826   - Diastolic (86SQD), VBJ:56, Min:62, Max:88  - One elevated blood pressure in triage  2) Headache  - Treated first with Tylenol & Reglan  - Followed by Solumedrol, Magnesium, and Reglan IV  3) Collect PreE Labs  CMC wnl  CMP wnl  P:C ratio wnl  4) Continue routine prenatal care  5) Discharge from [facility] triage with strict return precautions including:   - Reviewed rupture of membranes, false vs true labor, decreased fetal movement, and vaginal bleeding   - Reviewed signs and symptoms of preeclampsia, management, and risks   - Pt to call provider with any concerns and follow up at her next scheduled prenatal appointment    - Case discussed with Dr Maria Ines Alvarez:    Mac Ridgel 26 y o  Lat[place] Jade at [address] with an Estimated Date of Delivery: 10/15/22 who was seen today in the office and was noted to have an elevated blood pressure today in the office (146/88) this is her second elevated of the pregnancy (150/83 on 2022)  She endorses an irritractable headache as well (last night she took foricet without relief)  In triage she was given oral reglan and tylenol without relief  She was then given IV solumedrol, benadryl and reglan which reduced her headache to a 1 out of 10  She had one elevated pressure in triage (142/70) but was subsequently normotensive       Her past obstetrical history is significant for 2 prior SABs  Her current pregnancy has been complicated by a diagnosis of GHTN      Contractions: denies  Leakage of fluid: denies  Vaginal Bleeding: denies  Fetal movement: present    OBJECTIVE:    Vitals:    08/19/22 1413   BP: 123/65   Pulse: 85   Resp:    Temp:        ROS:  Constitutional: Negative  Neuro: endorses headache  Respiratory: Negative  Cardiovascular: denies retrosternal pain  Gastrointestinal: denies epigastric pain, denies upper abdominal pain,    General Physical Exam:  General: in no apparent distress and alert  Cardiovascular: Cor RRR  Lungs: non-labored breathing  Abdomen: abdomen is soft without significant tenderness, masses, organomegaly or guarding  Lower extremeties: nontender      Fetal monitoring:  FHT:  140 bpm/ Moderate 6 - 25 bpm / 15 x 15 accelerations present, no decelerations  Corn: contractions no present on toco     P:C ratio of 0 14  CBC/CMP wnl       Cheryl Martinez MD,  OBGYN PGY-1  8/19/2022 7:51 PM

## 2022-08-19 NOTE — PROGRESS NOTES
Yessenia Mejia is a  27 y o   31w6d  Reports ++FM, no LOF, VB, or contractions       Has been having more frequent headache recently, intractable headache since last night  Feels like usual migraines, but persistent since last night  BP this weekend at home 149/90    Vitals:    22 1100   BP: 146/88   S=D  +FHTs    A/P:  Elevated BP today, meets criteria for gHTN (BP 22 150/83, normal on repeat) will need labs and testing until delivery  Given intractable headache sending to triage now    TDAP vaccine--will consider  Breastfeeding: breast, pump ordered    Discussed pre-term labor precautions  Return to office in 2 weeks

## 2022-08-19 NOTE — PROGRESS NOTES
Hetal Miller was seen and examined in triage  She reports a history of migraines and was taking medications outside of pregnancy and following with neurology  She states that this felt like her typical migraine  She tried Fioricet last night without relief  She initially received Tylenol and PO reglan without relief of her headache  She then received Solumedrol, Reglan IV, and magnesium headache cocktail  Her headache went from a 6/10 to 4/10  She then had dinner and now states that her headache is a 1/10 and she feels much better  She would like to be discharged home  We reviewed the differential diagnosis of gHTN with resolving migraine vs  Preeclampsia with severe features  Labs were wnl  No SRBP  We reviewed the management and risks of preeclampsia  Initially MFM consultation had been requested due to concern for intractable headache but now appears to be resolving  Case was discussed with [facility] who agreed that discharge home is reasonable  We reviewed strict return precautions  At this time, will continue with the diagnosis of gHTN complicated by migraine headache, resolving  Patient aware of weekly blood work requirements and delivery 37w0d to 38w6d - scheduled  Patient for discharge home at this time  Paty Rowe MD  OB/GYN  8/19/2022  5:36 PM

## 2022-08-20 ENCOUNTER — NURSE TRIAGE (OUTPATIENT)
Dept: OTHER | Facility: OTHER | Age: 27
End: 2022-08-20

## 2022-08-20 ENCOUNTER — HOSPITAL ENCOUNTER (OUTPATIENT)
Facility: HOSPITAL | Age: 27
Setting detail: OBSERVATION
Discharge: HOME | End: 2022-08-21
Attending: OBSTETRICS & GYNECOLOGY | Admitting: OBSTETRICS & GYNECOLOGY
Payer: COMMERCIAL

## 2022-08-20 DIAGNOSIS — O13.3 GESTATIONAL HYPERTENSION, THIRD TRIMESTER: ICD-10-CM

## 2022-08-20 DIAGNOSIS — Z3A.32 32 WEEKS GESTATION OF PREGNANCY: ICD-10-CM

## 2022-08-20 DIAGNOSIS — R51.9 PERSISTENT HEADACHES: ICD-10-CM

## 2022-08-20 DIAGNOSIS — Z86.69 HX OF MIGRAINES: Primary | ICD-10-CM

## 2022-08-20 LAB
ALBUMIN SERPL BCP-MCNC: 3.7 G/DL (ref 3.5–5)
ALP SERPL-CCNC: 77 U/L (ref 34–104)
ALT SERPL W P-5'-P-CCNC: 11 U/L (ref 7–52)
ANION GAP SERPL CALCULATED.3IONS-SCNC: 9 MMOL/L (ref 4–13)
AST SERPL W P-5'-P-CCNC: 13 U/L (ref 13–39)
BACTERIA UR QL AUTO: ABNORMAL /HPF
BILIRUB SERPL-MCNC: 0.32 MG/DL (ref 0.2–1)
BILIRUB UR QL STRIP: NEGATIVE
BUN SERPL-MCNC: 7 MG/DL (ref 5–25)
CALCIUM SERPL-MCNC: 9.8 MG/DL (ref 8.4–10.2)
CHLORIDE SERPL-SCNC: 106 MMOL/L (ref 96–108)
CLARITY UR: ABNORMAL
CO2 SERPL-SCNC: 22 MMOL/L (ref 21–32)
COLOR UR: YELLOW
CREAT SERPL-MCNC: 0.65 MG/DL (ref 0.6–1.3)
CREAT UR-MCNC: 153.9 MG/DL
ERYTHROCYTE [DISTWIDTH] IN BLOOD BY AUTOMATED COUNT: 13 % (ref 11.6–15.1)
GFR SERPL CREATININE-BSD FRML MDRD: 124 ML/MIN/1.73SQ M
GLUCOSE SERPL-MCNC: 95 MG/DL (ref 65–140)
GLUCOSE UR STRIP-MCNC: NEGATIVE MG/DL
HCT VFR BLD AUTO: 36.5 % (ref 34.8–46.1)
HGB BLD-MCNC: 12.3 G/DL (ref 11.5–15.4)
HGB UR QL STRIP.AUTO: NEGATIVE
KETONES UR STRIP-MCNC: ABNORMAL MG/DL
LEUKOCYTE ESTERASE UR QL STRIP: ABNORMAL
MCH RBC QN AUTO: 29.3 PG (ref 26.8–34.3)
MCHC RBC AUTO-ENTMCNC: 33.7 G/DL (ref 31.4–37.4)
MCV RBC AUTO: 87 FL (ref 82–98)
MUCOUS THREADS UR QL AUTO: ABNORMAL
NITRITE UR QL STRIP: NEGATIVE
NON-SQ EPI CELLS URNS QL MICRO: ABNORMAL /HPF
PH UR STRIP.AUTO: 5.5 [PH]
PLATELET # BLD AUTO: 289 THOUSANDS/UL (ref 149–390)
PMV BLD AUTO: 10.5 FL (ref 8.9–12.7)
POTASSIUM SERPL-SCNC: 3.8 MMOL/L (ref 3.5–5.3)
PROT SERPL-MCNC: 6.7 G/DL (ref 6.4–8.4)
PROT UR STRIP-MCNC: ABNORMAL MG/DL
PROT UR-MCNC: 27 MG/DL
PROT/CREAT UR: 0.18 MG/G{CREAT} (ref 0–0.1)
RBC # BLD AUTO: 4.2 MILLION/UL (ref 3.81–5.12)
RBC #/AREA URNS AUTO: ABNORMAL /HPF
SODIUM SERPL-SCNC: 137 MMOL/L (ref 135–147)
SP GR UR STRIP.AUTO: 1.02 (ref 1–1.03)
UROBILINOGEN UR STRIP-ACNC: <2 MG/DL
WBC # BLD AUTO: 17.29 THOUSAND/UL (ref 4.31–10.16)
WBC #/AREA URNS AUTO: ABNORMAL /HPF

## 2022-08-20 PROCEDURE — 80053 COMPREHEN METABOLIC PANEL: CPT | Performed by: OBSTETRICS & GYNECOLOGY

## 2022-08-20 PROCEDURE — 87086 URINE CULTURE/COLONY COUNT: CPT | Performed by: OBSTETRICS & GYNECOLOGY

## 2022-08-20 PROCEDURE — 85027 COMPLETE CBC AUTOMATED: CPT | Performed by: OBSTETRICS & GYNECOLOGY

## 2022-08-20 PROCEDURE — 99213 OFFICE O/P EST LOW 20 MIN: CPT

## 2022-08-20 PROCEDURE — 81001 URINALYSIS AUTO W/SCOPE: CPT | Performed by: OBSTETRICS & GYNECOLOGY

## 2022-08-20 PROCEDURE — 82570 ASSAY OF URINE CREATININE: CPT | Performed by: OBSTETRICS & GYNECOLOGY

## 2022-08-20 PROCEDURE — 84156 ASSAY OF PROTEIN URINE: CPT | Performed by: OBSTETRICS & GYNECOLOGY

## 2022-08-20 PROCEDURE — NC001 PR NO CHARGE: Performed by: OBSTETRICS & GYNECOLOGY

## 2022-08-20 PROCEDURE — G0379 DIRECT REFER HOSPITAL OBSERV: HCPCS

## 2022-08-20 RX ORDER — BUTALBITAL, ACETAMINOPHEN AND CAFFEINE 50; 325; 40 MG/1; MG/1; MG/1
1 TABLET ORAL ONCE
Status: DISCONTINUED | OUTPATIENT
Start: 2022-08-20 | End: 2022-08-20

## 2022-08-20 RX ORDER — DIPHENHYDRAMINE HCL 25 MG
25 TABLET ORAL EVERY 6 HOURS PRN
Status: DISCONTINUED | OUTPATIENT
Start: 2022-08-20 | End: 2022-08-21 | Stop reason: HOSPADM

## 2022-08-20 RX ORDER — CALCIUM CARBONATE 200(500)MG
500 TABLET,CHEWABLE ORAL 2 TIMES DAILY PRN
Status: DISCONTINUED | OUTPATIENT
Start: 2022-08-20 | End: 2022-08-21 | Stop reason: HOSPADM

## 2022-08-20 RX ORDER — METOCLOPRAMIDE HYDROCHLORIDE 5 MG/ML
10 INJECTION INTRAMUSCULAR; INTRAVENOUS ONCE
Status: COMPLETED | OUTPATIENT
Start: 2022-08-20 | End: 2022-08-20

## 2022-08-20 RX ORDER — ACETAMINOPHEN 325 MG/1
975 TABLET ORAL EVERY 8 HOURS PRN
Status: DISCONTINUED | OUTPATIENT
Start: 2022-08-20 | End: 2022-08-21 | Stop reason: HOSPADM

## 2022-08-20 RX ORDER — DOCUSATE SODIUM 100 MG/1
100 CAPSULE, LIQUID FILLED ORAL 2 TIMES DAILY PRN
Status: DISCONTINUED | OUTPATIENT
Start: 2022-08-20 | End: 2022-08-21 | Stop reason: HOSPADM

## 2022-08-20 RX ORDER — BETAMETHASONE SODIUM PHOSPHATE AND BETAMETHASONE ACETATE 3; 3 MG/ML; MG/ML
12 INJECTION, SUSPENSION INTRA-ARTICULAR; INTRALESIONAL; INTRAMUSCULAR; SOFT TISSUE EVERY 24 HOURS
Status: DISCONTINUED | OUTPATIENT
Start: 2022-08-20 | End: 2022-08-21 | Stop reason: HOSPADM

## 2022-08-20 RX ORDER — MAGNESIUM SULFATE HEPTAHYDRATE 40 MG/ML
2 INJECTION, SOLUTION INTRAVENOUS ONCE
Status: COMPLETED | OUTPATIENT
Start: 2022-08-20 | End: 2022-08-20

## 2022-08-20 RX ORDER — MAGNESIUM SULFATE HEPTAHYDRATE 40 MG/ML
2 INJECTION, SOLUTION INTRAVENOUS ONCE
Status: DISCONTINUED | OUTPATIENT
Start: 2022-08-20 | End: 2022-08-20

## 2022-08-20 RX ORDER — METOCLOPRAMIDE HYDROCHLORIDE 5 MG/ML
10 INJECTION INTRAMUSCULAR; INTRAVENOUS EVERY 6 HOURS PRN
Status: DISCONTINUED | OUTPATIENT
Start: 2022-08-20 | End: 2022-08-21 | Stop reason: HOSPADM

## 2022-08-20 RX ADMIN — SODIUM CHLORIDE 500 MG: 0.9 INJECTION, SOLUTION INTRAVENOUS at 18:49

## 2022-08-20 RX ADMIN — METOCLOPRAMIDE 10 MG: 5 INJECTION, SOLUTION INTRAMUSCULAR; INTRAVENOUS at 18:17

## 2022-08-20 RX ADMIN — ACETAMINOPHEN 975 MG: 325 TABLET ORAL at 23:22

## 2022-08-20 RX ADMIN — MAGNESIUM SULFATE HEPTAHYDRATE 2 G: 40 INJECTION, SOLUTION INTRAVENOUS at 18:18

## 2022-08-20 RX ADMIN — BETAMETHASONE SODIUM PHOSPHATE AND BETAMETHASONE ACETATE 12 MG: 3; 3 INJECTION, SUSPENSION INTRA-ARTICULAR; INTRALESIONAL; INTRAMUSCULAR at 21:37

## 2022-08-20 NOTE — TELEPHONE ENCOUNTER
Reason for Disposition   [1] Pregnant > 20 weeks AND [5] BP Systolic BP  >= 441 OR Diastolic >= 90    Answer Assessment - Initial Assessment Questions  1  BLOOD PRESSURE: "What is the blood pressure?" "Did you take at least two measurements 5 minutes apart?"      148/90 12:30, 152/95    2  ONSET: "When did you take your blood pressure?"      Current    3  HOW: "How did you obtain the blood pressure?" (e g , visiting nurse, automatic home BP monitor)      Automatic home cuff, wrist    4  HISTORY: "Do you have a history of high blood pressure?"      Denies    5  MEDICATIONS: "Are you taking any medications for blood pressure?" "Have you missed any doses recently?"      Denies    6  OTHER SYMPTOMS: "Do you have any symptoms?" (e g , headache, chest pain, blurred vision, difficulty breathing, weakness)      Headache 7/10    7   PREGNANCY: "Is there any chance you are pregnant?" "When was your last menstrual period?"      32w0d 10/15    Protocols used: HIGH BLOOD PRESSURE-ADULT-

## 2022-08-20 NOTE — TELEPHONE ENCOUNTER
Regarding: Headache / High BP: 148/90  ----- Message from Piter Chester sent at 8/20/2022  1:58 PM EDT -----  "My headache is back & my blood pressure is: 148/90   Should I go back to the ER?"

## 2022-08-21 ENCOUNTER — HOSPITAL ENCOUNTER (OUTPATIENT)
Facility: HOSPITAL | Age: 27
Discharge: HOME | End: 2022-08-21
Attending: STUDENT IN AN ORGANIZED HEALTH CARE EDUCATION/TRAINING PROGRAM | Admitting: OBSTETRICS & GYNECOLOGY
Payer: COMMERCIAL

## 2022-08-21 VITALS
SYSTOLIC BLOOD PRESSURE: 118 MMHG | DIASTOLIC BLOOD PRESSURE: 63 MMHG | TEMPERATURE: 98.1 F | OXYGEN SATURATION: 97 % | HEART RATE: 88 BPM | BODY MASS INDEX: 38.32 KG/M2 | HEIGHT: 65 IN | WEIGHT: 230 LBS | RESPIRATION RATE: 18 BRPM

## 2022-08-21 PROBLEM — R51.9 PERSISTENT HEADACHES: Status: ACTIVE | Noted: 2022-08-21

## 2022-08-21 PROCEDURE — NC001 PR NO CHARGE: Performed by: OBSTETRICS & GYNECOLOGY

## 2022-08-21 PROCEDURE — 76816 OB US FOLLOW-UP PER FETUS: CPT | Performed by: OBSTETRICS & GYNECOLOGY

## 2022-08-21 PROCEDURE — 99212 OFFICE O/P EST SF 10 MIN: CPT

## 2022-08-21 PROCEDURE — 59025 FETAL NON-STRESS TEST: CPT | Performed by: OBSTETRICS & GYNECOLOGY

## 2022-08-21 PROCEDURE — 96372 THER/PROPH/DIAG INJ SC/IM: CPT

## 2022-08-21 PROCEDURE — 99244 OFF/OP CNSLTJ NEW/EST MOD 40: CPT | Performed by: OBSTETRICS & GYNECOLOGY

## 2022-08-21 RX ORDER — ACETAMINOPHEN 325 MG/1
975 TABLET ORAL EVERY 8 HOURS PRN
Refills: 0
Start: 2022-08-21

## 2022-08-21 RX ORDER — BETAMETHASONE SODIUM PHOSPHATE AND BETAMETHASONE ACETATE 3; 3 MG/ML; MG/ML
12 INJECTION, SUSPENSION INTRA-ARTICULAR; INTRALESIONAL; INTRAMUSCULAR; SOFT TISSUE ONCE
Status: COMPLETED | OUTPATIENT
Start: 2022-08-21 | End: 2022-08-21

## 2022-08-21 RX ORDER — METOCLOPRAMIDE HYDROCHLORIDE 5 MG/ML
10 INJECTION INTRAMUSCULAR; INTRAVENOUS ONCE
Status: COMPLETED | OUTPATIENT
Start: 2022-08-21 | End: 2022-08-21

## 2022-08-21 RX ORDER — METOCLOPRAMIDE 10 MG/1
10 TABLET ORAL EVERY 6 HOURS PRN
Qty: 30 TABLET | Refills: 0 | Status: SHIPPED | OUTPATIENT
Start: 2022-08-21 | End: 2022-10-28 | Stop reason: SDUPTHER

## 2022-08-21 RX ADMIN — ACETAMINOPHEN 975 MG: 325 TABLET ORAL at 10:15

## 2022-08-21 RX ADMIN — METOCLOPRAMIDE HYDROCHLORIDE 10 MG: 5 INJECTION INTRAMUSCULAR; INTRAVENOUS at 10:28

## 2022-08-21 RX ADMIN — Medication 1 TABLET: at 10:15

## 2022-08-21 RX ADMIN — BETAMETHASONE SODIUM PHOSPHATE AND BETAMETHASONE ACETATE 12 MG: 3; 3 INJECTION, SUSPENSION INTRA-ARTICULAR; INTRALESIONAL; INTRAMUSCULAR at 21:16

## 2022-08-21 RX ADMIN — METOCLOPRAMIDE 10 MG: 5 INJECTION, SOLUTION INTRAMUSCULAR; INTRAVENOUS at 10:24

## 2022-08-21 NOTE — UTILIZATION REVIEW
Initial Clinical Review    Admission: Date/Time/Statement: 22 observation   Admission Orders (From admission, onward)     Ordered        22  Place in Observation  Once                      Orders Placed This Encounter   Procedures    Place in Observation     Standing Status:   Standing     Number of Occurrences:   1     Order Specific Question:   Level of Care     Answer:   Med Surg [16]      Arrival Information     Patient  seen in L&D triage                     Chief Complaint   Patient presents with    Pre-Eclampsia       Initial Presentation: 27 y o  female  From home to L&D triage, admitted to observation due to Persistent headaches  Patient is  female at 32w0d, FREDY 10/15/2022  PMH of migraines  Re Presented due to severe headache starting evening of arrival   Seen  in L&D Triage for headache and was treated with Fioricet, then IV Reglan, Solu medrol and Magnesium then total relief after eating  diagnosed with gestational hypertension  Discharged and relief only lasted 3 hours  In triage given Headache cocktail and headache persisted  Plan is monitor BMP,   Headache medication of tylenol and Reglan as needed  22 Observation:   Last night after had dinner, noted improvement in headache        ED Triage Vitals   Temperature Pulse Respirations Blood Pressure SpO2   22 1619 22 1632 22 1619 22 1632 22 2318   98 2 °F (36 8 °C) 103 20 128/79 97 %      Temp Source Heart Rate Source Patient Position - Orthostatic VS BP Location FiO2 (%)   22 1619 22 1632 22 2318 22 1632 --   Oral Monitor Lying Right arm       Pain Score       22 1619       7          Wt Readings from Last 1 Encounters:   22 104 kg (230 lb)     Additional Vital Signs:   22 0000 -- -- -- -- -- None (Room air) WDL --   22 2318 97 8 °F (36 6 °C) 87 18 118/61 97 % -- -- Lying   229 -- 90 -- 138/81 -- -- -- --   22 2108 -- 86 -- 147/76 -- -- -- --   Comment rows:   OBSERV: review fhr strip with leigh Lopez md keep fhr on after moving pt to antepartum and then reevaluate at 08/20/22 2108 08/20/22 1816 -- 99 -- 135/76 -- -- -- --   08/20/22 1801 -- 100 -- 126/69 -- -- -- --   08/20/22 1746 -- 92 -- 124/69 -- -- -- --   08/20/22 1733 -- 97 -- 136/72 -- -- -- --   08/20/22 1703 -- 96 -- 128/65 -- --         Pertinent Labs/Diagnostic Test Results:  No imaging   No orders to display         Results from last 7 days   Lab Units 08/20/22 1723 08/19/22  1304   WBC Thousand/uL 17 29* 13 25*   HEMOGLOBIN g/dL 12 3 12 8   HEMATOCRIT % 36 5 38 8   PLATELETS Thousands/uL 289 287     Results from last 7 days   Lab Units 08/20/22 1723 08/19/22  1304   SODIUM mmol/L 137 134*   POTASSIUM mmol/L 3 8 4 1   CHLORIDE mmol/L 106 105   CO2 mmol/L 22 23   ANION GAP mmol/L 9 6   BUN mg/dL 7 5   CREATININE mg/dL 0 65 0 57*   EGFR ml/min/1 73sq m 124 130   CALCIUM mg/dL 9 8 9 6     Results from last 7 days   Lab Units 08/20/22 1723 08/19/22  1304   AST U/L 13 12*   ALT U/L 11 9   ALK PHOS U/L 77 84   TOTAL PROTEIN g/dL 6 7 6 8   ALBUMIN g/dL 3 7 3 6   TOTAL BILIRUBIN mg/dL 0 32 0 31     Results from last 7 days   Lab Units 08/20/22  1723 08/19/22  1304   GLUCOSE RANDOM mg/dL 95 77     Results from last 7 days   Lab Units 08/20/22 1723 08/19/22  1304   CLARITY UA  Turbid  --    COLOR UA  Yellow  --    SPEC GRAV UA  1 017  --    PH UA  5 5  --    GLUCOSE UA mg/dl Negative  --    KETONES UA mg/dl 20 (1+)*  --    BLOOD UA  Negative  --    PROTEIN UA mg/dl Trace*  --    NITRITE UA  Negative  --    BILIRUBIN UA  Negative  --    UROBILINOGEN UA (BE) mg/dl <2 0  --    LEUKOCYTES UA  Small*  --    WBC UA /hpf 20-30*  --    RBC UA /hpf 1-2  --    BACTERIA UA /hpf Occasional  --    EPITHELIAL CELLS WET PREP /hpf Moderate*  --    MUCUS THREADS  Occasional*  --    CREATININE UR mg/dL 153 9 49 6   PROTEIN UR mg/dL 27 7   PROT/CREAT RATIO UR  0 18* 0 14*         No past medical history on file  Present on Admission:   Gestational hypertension, third trimester      Admitting Diagnosis: Headache, unspecified [R51 9]  BP (high blood pressure) [I10]  32 weeks gestation of pregnancy [Z3A 32]  Age/Sex: 27 y o  female  Admission Orders:  Scheduled Medications:  betamethasone acetate-betamethasone sodium phosphate, 12 mg, Intramuscular, Q24H  prenatal multivitamin, 1 tablet, Oral, Daily    magnesium sulfate 2 g/50 mL IVPB (premix) 2 g  Dose: 2 g  Freq: Once Route: IV  Last Dose: Stopped (08/20/22 1848)  Start: 08/20/22 1815 End: 08/20/22 1848    methylPREDNISolone sodium succinate (Solu-MEDROL) 500 mg in sodium chloride 0 9 % 250 mL IVPB  Dose: 500 mg  Freq: Once Route: IV  Last Dose: Stopped (08/20/22 1950)  Start: 08/20/22 1745 End: 08/20/22 1950    metoclopramide (REGLAN) injection 10 mg  Dose: 10 mg  Freq: Once Route: IV  Start: 08/20/22 1745 End: 08/20/22 1817    Continuous IV Infusions:     PRN Meds:  acetaminophen, 975 mg, Oral, Q8H PRN - used x 1 8/20  calcium carbonate, 500 mg, Oral, BID PRN  diphenhydrAMINE, 25 mg, Oral, Q6H PRN  docusate sodium, 100 mg, Oral, BID PRN  metoclopramide, 10 mg, Intravenous, Q6H PRN    Urine culture  Fetal nonstress test TID    IP CONSULT TO PERINATOLOGY    Network Utilization Review Department  ATTENTION: Please call with any questions or concerns to [phone] and carefully listen to the prompts so that you are directed to the right person  All voicemails are confidential   Teresa Becerra all requests for admission clinical reviews, approved or denied determinations and any other requests to dedicated fax number below belonging to the campus where the patient is receiving treatment  List of dedicated fax numbers for the Facilities:  [address] [place] [address] DENIALS (Administrative/Medical Necessity) [phone]   [address] (Maternity/NICU/Pediatrics) [phone]   [address] [place] [address] [phone]     [address]  [phone]   [address] [place] [address] [address] [place] [address] [address] [place] [address] [phone]

## 2022-08-21 NOTE — ASSESSMENT & PLAN NOTE
Betamethasone #1 given, she will return for dose #2 tonight  NSTs are reactive  Growth ultrasound performed, see OB procedures  4lb3oz is EFW, (33%YJT), cephalic presentation with normal fluid  I advised she keep her outpatient ultrasound as evaluation of fetal anatomy was not performed today, and AC was 11%ile  Kick counting advised

## 2022-08-21 NOTE — ASSESSMENT & PLAN NOTE
8/19: S/p Fioricet, headache cocktail   8/20: S/p headache cocktail, with minimal improvement in headache  Admission for observation   Routine BP monitoring   Headache/Pain meds: Tylenol, Reglan PRN overnight   Will hold off on magnesium sulfate infusion for seizure prophylaxis for now; consider initiating if have higher suspicion for preeclampsia w SF diagnosis   Re-evaluate headache symptoms in the AM  F/u MFM consultation   Consider Neurology consult and neuroimaging if headache persists

## 2022-08-21 NOTE — ASSESSMENT & PLAN NOTE
HELLP labs are within normal limits  Her headache is moderate, different in quality from her migraines (usually left temporal, this is frontal)  She has had some relief with headache cocktail, headache is not severe or associated with focal neuro symptoms  We discussed the option for further workup with neuro-imaging (MRI) and neurology consult, versus continued supportive care with meds that have worked for her  She opts for conservative management at this time, I prescribed her Reglan and Tylenol as an outpatient  Her baby shower is today  24h urine protein collection to be done outpatient  She will return tonight for betamethasone #2 and re-assessment of headache  Strict precautions given to return if symptoms persistent or worsening, or not relieved with medication  Delivery advised at 37 weeks unless signs/symptoms of severe preeclampsia warrant sooner delivery

## 2022-08-21 NOTE — H&P
History and Physical - Obstetrics  Marie Yee 27 y o  female MRN: [id number]  Unit/Bed#: -01 Encounter: [id number]    ObGyn Provider:  [facility]     ASSESSMENT AND PLAN:  Marie Yee is a 27y o  year-old  at [address] [facility] Day: 2, with gHTN this pregnancy, admitted for observation of persistent severe headache and RUQ pain   By issue:    Persistent headaches  Assessment & Plan  : S/p Fioricet, headache cocktail   : S/p headache cocktail, with minimal improvement in headache  Admission for observation   Routine BP monitoring   Headache/Pain meds: Tylenol, Reglan PRN overnight   Will hold off on magnesium sulfate infusion for seizure prophylaxis for now; consider initiating if have higher suspicion for preeclampsia w SF diagnosis   Re-evaluate headache symptoms in the AM  F/u MFM consultation   Consider Neurology consult and neuroimaging if headache persists     Hx of migraines  Assessment & Plan  Patient with hx of severe migraines; was previously on sumatriptan and venlafaxine  Last seen by Neurology outpatient in   Consider Neurology consultation while inpatient if headaches do not improve   Consider neuroimaging if headache does not improve    Gestational hypertension, third trimester  Assessment & Plan  Preeclampsia labs  and  WNL  Does not meet diagnostic criteria for preeclampsia at this time; however, if headache remains persistent, consider diagnosis of preeclampsia with severe features     * 32 weeks gestation of pregnancy  Assessment & Plan  BTM -  NST TID       The above assessment and plan was discussed with the admitting provider, Dr Dr Rosa Isela Mendiola    Expected LOS: NOT DOCUMENTED  Admission: OBSERVATION    SUBJECTIVE:  Chief Complaint:  headache     History of Present Illness:  Marie Yee is a 27 y o   female at 30w0d, FREDY 10/15/2022, by Last Menstrual Period, Hospital Day: 2, who initially presented to triage this evening with persistent, severe headache  Patient presented to triage just yesterday on 08/19 with the same symptoms  She had noted temporal headache at that time  She received headache treatment with Fioricet, followed by headache cocktail (IV Reglan, Solu-Medrol, magnesium)  This provided minimal to moderate relief  Ultimately, she had significant relief of her headache after being able to eat something  Patient was counseled at that time on concern for developing preeclampsia  Preeclampsia labs were negative  She was ultimately diagnosed gestational hypertension prior to discharge  States about 3 years after returning home that night, the headache returned in severity  She did not sleep well  The headache persisted in severity today  She state the headache is now more frontal  She had not taken Fioricet today as her prescription was not refilled  She noted her blood pressures at home were starting to creep up throughout the day as her headache got worse  The patient is worried about her headache not going away  Of note, the patient has a known history of severe migraines prior to pregnancy  Last saw a neurologist in 2018  Was previously taking sumatriptan and venlafaxine (per chart review; patient could not remember medication names)  We discussed possible options for treating her headache this presentation, including starting with Fioricet, or starting right away with headache cocktail  Patient was undecided  Ultimately she was amenable to starting with headache cocktail, which was subsequently administered  She did not note much improvement in headache symptoms after  Her blood pressures were noted to range normotensive - 140s SBP  Given persistent headache in the setting of recently diagnosed gHTN, patient was counseled on admission for observation to determine/rule out developing preeclampsia with or without severe features   Patient was initially hesitant; after thorough explanation of plan and concerns, patient was ultimately agreeable to stay for observation  Review of Systems   Constitutional: Negative for chills and fatigue  Eyes: Negative for photophobia and visual disturbance  Respiratory: Negative for chest tightness and shortness of breath  Cardiovascular: Negative for chest pain and palpitations  Gastrointestinal: Positive for abdominal pain (mild RUQ pain) and nausea  Negative for constipation, diarrhea and vomiting  Genitourinary: Negative for dysuria, vaginal bleeding and vaginal discharge  Neurological: Positive for headaches  Negative for dizziness  Psychiatric/Behavioral: Negative for confusion  Current Pregnancy Problems:  Problem   Persistent Headaches   32 Weeks Gestation of Pregnancy   Gestational Hypertension, Third Trimester   Hx of Migraines       Past Obstetric History:   Patient's last menstrual period was 2022  OB History    Para Term  AB Living   3 0 0 0 2 0   SAB IAB Ectopic Multiple Live Births   2 0 0 0 0      # Outcome Date GA Lbr Sadiq/2nd Weight Sex Delivery Anes PTL Lv   3 Current            2 2019           1 2017               Pregnancy Plan:  Pregnancy: Kannan Dienes  Fetal sex: Female  Support person: Rita lindsey; mom, carlos alberto     Post-Delivery Plans  Feeding intentions: Breast Milk    HISTORICAL INFORMATION:  No past medical history on file  Past Surgical History:   Procedure Laterality Date    TONSILLECTOMY      WISDOM TOOTH EXTRACTION       Social History   Alcohol use: no  Tobacco use: no  Other substance use: no    Other: no    Family History   Problem Relation Age of Onset    Diabetes Mother     Diabetes Maternal Grandmother     Cancer Maternal Grandfather         prostate? colon? Allergies:    Allergies   Allergen Reactions    Latex Hives and Rash    Adhesive [Medical Tape] Rash     Redness, rash, possible Latex allergy as well       Current Medications:  Current Outpatient Medications   Medication Instructions    butalbital-acetaminophen-caffeine (Esgic) -40 mg per tablet 1 tablet, Oral, Every 6 hours PRN    Prenatal Vit-Fe Fumarate-FA (PRENATAL VITAMIN PO) Oral       OBJECTIVE:  Vitals:  Patient Vitals for the past 24 hrs:   BP Temp Temp src Pulse Resp SpO2 Height Weight   08/21/22 0819 118/63 98 1 °F (36 7 °C) Oral 88 18 -- -- --   08/20/22 2318 118/61 97 8 °F (36 6 °C) Oral 87 18 97 % 5' 5" (1 651 m) 104 kg (230 lb)   08/20/22 2139 138/81 -- -- 90 -- -- -- --   08/20/22 2108 147/76 -- -- 86 -- -- -- --   08/20/22 1816 135/76 -- -- 99 -- -- -- --   08/20/22 1801 126/69 -- -- 100 -- -- -- --   08/20/22 1746 124/69 -- -- 92 -- -- -- --   08/20/22 1733 136/72 -- -- 97 -- -- -- --   08/20/22 1703 128/65 -- -- 96 -- -- -- --   08/20/22 1646 121/72 -- -- 105 -- -- -- --   08/20/22 1632 128/79 -- -- 103 -- -- -- --   08/20/22 1619 -- 98 2 °F (36 8 °C) Oral -- 20 -- -- --     Body mass index is 38 27 kg/m²  Invasive Devices  Timeline    Peripheral Intravenous Line  Duration           Peripheral IV 08/20/22 Right;Ventral (anterior) Wrist <1 day                Physical Exam  Constitutional:       Appearance: She is obese  She is not ill-appearing or diaphoretic  HENT:      Head: Normocephalic  Farooq's sign present  Eyes:      Conjunctiva/sclera: Conjunctivae normal       Pupils: Pupils are equal, round, and reactive to light  Cardiovascular:      Rate and Rhythm: Normal rate  Pulmonary:      Effort: Pulmonary effort is normal  No respiratory distress  Abdominal:      General: There is distension (gravid)  Palpations: Abdomen is soft  Tenderness: There is no abdominal tenderness  Musculoskeletal:         General: Normal range of motion  Skin:     General: Skin is warm and dry  Neurological:      General: No focal deficit present  Mental Status: She is alert and oriented to person, place, and time  Mental status is at baseline     Psychiatric:         Mood and Affect: Mood normal          Behavior: Behavior normal          Thought Content:  Thought content normal          Cervical Exam:    Not performed     Estimated fetal weight: 353 grams - 0 lbs 12 oz  (5/31)    Membranes: intact on admission  Placenta: posterior     Fetal Heart Tracing:  FHT:   Baseline Rate: 130 bpm  Variability: Moderate 6-25 bpm  Accelerations: 15 x 15 or greater, At variable times  Decelerations: None  FHR Category: Category I    Steele:  Contraction Frequency (minutes): 0  Contraction Duration (seconds): 0  Contraction Quality: Not applicable    Prenatal Labs:  Reviewed, located in [facility] through [facility]    Other pertinent admission labs:  Recent Results (from the past 24 hour(s))   CBC    Collection Time: 08/20/22  5:23 PM   Result Value Ref Range    WBC 17 29 (H) 4 31 - 10 16 Thousand/uL    RBC 4 20 3 81 - 5 12 Million/uL    Hemoglobin 12 3 11 5 - 15 4 g/dL    Hematocrit 36 5 34 8 - 46 1 %    MCV 87 82 - 98 fL    MCH 29 3 26 8 - 34 3 pg    MCHC 33 7 31 4 - 37 4 g/dL    RDW 13 0 11 6 - 15 1 %    Platelets 195 957 - 049 Thousands/uL    MPV 10 5 8 9 - 12 7 fL   Comprehensive metabolic panel    Collection Time: 08/20/22  5:23 PM   Result Value Ref Range    Sodium 137 135 - 147 mmol/L    Potassium 3 8 3 5 - 5 3 mmol/L    Chloride 106 96 - 108 mmol/L    CO2 22 21 - 32 mmol/L    ANION GAP 9 4 - 13 mmol/L    BUN 7 5 - 25 mg/dL    Creatinine 0 65 0 60 - 1 30 mg/dL    Glucose 95 65 - 140 mg/dL    Calcium 9 8 8 4 - 10 2 mg/dL    AST 13 13 - 39 U/L    ALT 11 7 - 52 U/L    Alkaline Phosphatase 77 34 - 104 U/L    Total Protein 6 7 6 4 - 8 4 g/dL    Albumin 3 7 3 5 - 5 0 g/dL    Total Bilirubin 0 32 0 20 - 1 00 mg/dL    eGFR 124 ml/min/1 73sq m   Protein / creatinine ratio, urine    Collection Time: 08/20/22  5:23 PM   Result Value Ref Range    Creatinine, Ur 153 9 mg/dL    Protein Urine Random 27 mg/dL    Prot/Creat Ratio, Ur 0 18 (H) 0 00 - 0 10   UA w Reflex to Microscopic w Reflex to Culture    Collection Time: 08/20/22  5:23 PM    Specimen: Urine, Clean Catch   Result Value Ref Range    Color, UA Yellow     Clarity, UA Turbid     Specific [place], UA 1 017 1 003 - 1 030    pH, UA 5 5 4 5, 5 0, 5 5, 6 0, 6 5, 7 0, 7 5, 8 0    Leukocytes, UA Small (A) Negative    Nitrite, UA Negative Negative    Protein, UA Trace (A) Negative mg/dl    Glucose, UA Negative Negative mg/dl    Ketones, UA 20 (1+) (A) Negative mg/dl    Urobilinogen, UA <2 0 <2 0 mg/dl mg/dl    Bilirubin, UA Negative Negative    Occult Blood, UA Negative Negative   Urine Microscopic    Collection Time: 08/20/22  5:23 PM   Result Value Ref Range    RBC, UA 1-2 None Seen, 1-2 /hpf    WBC, UA 20-30 (A) None Seen, 1-2 /hpf    Epithelial Cells Moderate (A) None Seen, Occasional /hpf    Bacteria, UA Occasional None Seen, Occasional /hpf    MUCUS THREADS Occasional (A) None Seen       Imaging, EKG, Pathology, and Other Studies: I have personally reviewed pertinent reports          Elena Draper MD  OB/GYN, PGY-4  8/21/2022  10:23 AM

## 2022-08-21 NOTE — PROGRESS NOTES
Attempted to visit patient at bedside early this morning to re-evaluate headache symptoms  Per RN, after patient was admitted, she was able to eat dinner, after which she felt better and noted improvement in her headache severity  She then took 1 dose of Tylenol 975 mg once  This morning, she is fast asleep  Will allow patient to continue resting at this time  Follow-up of some consultation for formal recommendations regarding management of headache      Objective:  Vitals:    08/21/22 0819   BP: 118/63   Pulse: 88   Resp: 18   Temp: 98 1 °F (36 7 °C)   SpO2:      A/P:  - Routine vitals  - NST TID   - F/u MFM consultation this AM   - Tylenol, Reglan PRN   - F/u 24h urine protein   - Second dose of BTM today   - Continue inpatient management of headache for now

## 2022-08-21 NOTE — DISCHARGE INSTRUCTIONS
If you have a worsening headache not relieved by medication, visual changes, upper abdominal pain, or sudden weight gain, call you OB/GYN right away  Continue counting baby's kicks  Any leaking fluid, vaginal bleeding, or regular contraction should be reported to your OB/GYN  Start your 24h urine collection on 8/22/22, and return it to [facility] lab after 24h completion  Please return tonight at 9pm for your second betamethasone (steroid) injection

## 2022-08-21 NOTE — CONSULTS
Consultation - Maternal Fetal Medicine   Selina Abraham 27 y o  female MRN: [id number]  Unit/Bed#: -01 Encounter: [id number]  Admit date: 2022  Today's date: 22    Assessment/Plan   Ms Evette Gomez is a 27y o  year-old  at [facility] 26  Day: 2, admitted for headaches in the setting of gestational hypertension  By issue:    Gestational hypertension, third trimester  Assessment & Plan  HELLP labs are within normal limits  Her headache is moderate, different in quality from her migraines (usually left temporal, this is frontal)  She has had some relief with headache cocktail, headache is not severe or associated with focal neuro symptoms  We discussed the option for further workup with neuro-imaging (MRI) and neurology consult, versus continued supportive care with meds that have worked for her  She opts for conservative management at this time, I prescribed her Reglan and Tylenol as an outpatient  Her baby shower is today  24h urine protein collection to be done outpatient  She will return tonight for betamethasone #2 and re-assessment of headache  Strict precautions given to return if symptoms persistent or worsening, or not relieved with medication  Delivery advised at 37 weeks unless signs/symptoms of severe preeclampsia warrant sooner delivery  * 32 weeks gestation of pregnancy  Assessment & Plan  Betamethasone #1 given, she will return for dose #2 tonight  NSTs are reactive  Growth ultrasound performed, see OB procedures  4lb3oz is EFW, (69%LLV), cephalic presentation with normal fluid  I advised she keep her outpatient ultrasound as evaluation of fetal anatomy was not performed today, and AC was 11%ile  Kick counting advised            Chief Complaint   Patient presents with   [address]       Physician Requesting Consult: Casie Pratt MD  Reason for Consult / Principal Problem: gestational hypertension  Subspeciality: Perinatology    Dear Dr Bridget Alberto,    Thank you for referring patient Tin Pillai for Maternal-Fetal Medicine consultation regarding gestational hypertension and headaches  HPI: As you know, Ms  Ned Pearl is a 27y o  year-old  with an FREDY of Estimated Date of Delivery: 10/15/22 at [facility] 26  Day: 2  She re-presented on 22 after developing another headache at home  She had been seen in triage 22, and experienced resolution of her severe headache after tylenol, reglan, solumetrol and magnesium sulfate  She reports she was not sent home with any medications for headache, and when her headache returned she came back in for evaluation  She has chronic temporal left-sided migraines, and her current headache is frontal, and 4-5/10 in intensity  It is not the worst headache of her life  She does not have light/sound sensitivity  She does not endorse numbness/weakness, or visual changes  Other obstetric review of symptoms:  Contractions: None  Leakage of fluid: None  Bleeding: None  Fetal movement: present  Pertinent items are noted in HPI  Review of Systems   Constitutional: Negative for fever  Gastrointestinal: Negative for abdominal pain and vomiting  Other history is as follows:    Historical Information   OB History    Para Term  AB Living   3       2     SAB IAB Ectopic Multiple Live Births   2              # Outcome Date GA Lbr Sadiq/2nd Weight Sex Delivery Anes PTL Lv   3 Current            2 SAB 2019           1  2017             Gynecologic history: Patient's last menstrual period was 2022  No past medical history on file    Past Surgical History:   Procedure Laterality Date    TONSILLECTOMY      WISDOM TOOTH EXTRACTION       Social History   Social History     Substance and Sexual Activity   Alcohol Use Not Currently     Social History     Substance and Sexual Activity   Drug Use Never     Social History     Tobacco Use   Smoking Status Never Smoker   Smokeless Tobacco Never Used     Family History: non-contributory    Meds/Allergies   Prior to Admission Medications   Prescriptions Last Dose Informant Patient Reported? Taking?    Prenatal Vit-Fe Fumarate-FA (PRENATAL VITAMIN PO) 8/20/2022 at Unknown time  Yes Yes   Sig: Take by mouth      Facility-Administered Medications: None     Medication Administration - last 24 hours from 08/20/2022 1126 to 08/21/2022 1126       Date/Time Order Dose Route Action Action by     08/20/2022 1851 butalbital-acetaminophen-caffeine (FIORICET,ESGIC) -40 mg per tablet 1 tablet 1 tablet Oral Not Given Fernando Ash RN     08/20/2022 1821 magnesium sulfate 2 g/50 mL IVPB (premix) 2 g 2 g Intravenous Not Given Fernando Ash RN     08/20/2022 1817 metoclopramide (REGLAN) injection 10 mg 10 mg Intravenous Given Fernando Ash RN     08/20/2022 1950 methylPREDNISolone sodium succinate (Solu-MEDROL) 500 mg in sodium chloride 0 9 % 250 mL IVPB 0 mg Intravenous Stopped Neisha Millard RN     08/20/2022 1849 methylPREDNISolone sodium succinate (Solu-MEDROL) 500 mg in sodium chloride 0 9 % 250 mL IVPB 500 mg Intravenous [address] Fernando Ash RN     08/20/2022 1848 magnesium sulfate 2 g/50 mL IVPB (premix) 2 g 0 g Intravenous Stopped Katerin Moyer RN     08/20/2022 1818 magnesium sulfate 2 g/50 mL IVPB (premix) 2 g 2 g Intravenous [address] Fernando Ash RN     08/20/2022 2137 betamethasone acetate-betamethasone sodium phosphate (CELESTONE) injection 12 mg 12 mg Intramuscular Given Ivania Riojas RN     08/21/2022 1015 prenatal multivitamin tablet 1 tablet 1 tablet Oral Given Lisa Reese RN     08/21/2022 1015 acetaminophen (TYLENOL) tablet 975 mg 975 mg Oral Given Lisa Reese RN     08/20/2022 2322 acetaminophen (TYLENOL) tablet 975 mg 975 mg Oral Given Neisha Millard RN     08/21/2022 1028 metoclopramide (REGLAN) injection 10 mg 10 mg Intravenous Given Lisa Reese RN     08/21/2022 1024 metoclopramide (REGLAN) injection 10 mg 10 mg Intravenous Given Rafael Sam RN        Current Facility-Administered Medications   Medication Dose Route Frequency    acetaminophen (TYLENOL) tablet 975 mg  975 mg Oral Q8H PRN    betamethasone acetate-betamethasone sodium phosphate (CELESTONE) injection 12 mg  12 mg Intramuscular Q24H    calcium carbonate (TUMS) chewable tablet 500 mg  500 mg Oral BID PRN    diphenhydrAMINE (BENADRYL) tablet 25 mg  25 mg Oral Q6H PRN    docusate sodium (COLACE) capsule 100 mg  100 mg Oral BID PRN    metoclopramide (REGLAN) injection 10 mg  10 mg Intravenous Q6H PRN    prenatal multivitamin tablet 1 tablet  1 tablet Oral Daily     Allergies   Allergen Reactions    Latex Hives and Rash    Adhesive [Medical Tape] Rash     Redness, rash, possible Latex allergy as well       Objective    Patient Vitals for the past 24 hrs:   BP Temp Temp src Pulse Resp SpO2 Height Weight   08/21/22 0819 118/63 98 1 °F (36 7 °C) Oral 88 18 -- -- --   08/20/22 2318 118/61 97 8 °F (36 6 °C) Oral 87 18 97 % 5' 5" (1 651 m) 104 kg (230 lb)   08/20/22 2139 138/81 -- -- 90 -- -- -- --   08/20/22 2108 147/76 -- -- 86 -- -- -- --   08/20/22 1816 135/76 -- -- 99 -- -- -- --   08/20/22 1801 126/69 -- -- 100 -- -- -- --   08/20/22 1746 124/69 -- -- 92 -- -- -- --   08/20/22 1733 136/72 -- -- 97 -- -- -- --   08/20/22 1703 128/65 -- -- 96 -- -- -- --   08/20/22 1646 121/72 -- -- 105 -- -- -- --   08/20/22 1632 128/79 -- -- 103 -- -- -- --   08/20/22 1619 -- 98 2 °F (36 8 °C) Oral -- 20 -- -- --     Vitals: Blood pressure 118/63, pulse 88, temperature 98 1 °F (36 7 °C), temperature source Oral, resp  rate 18, height 5' 5" (1 651 m), weight 104 kg (230 lb), last menstrual period 01/08/2022, SpO2 97 %  Body mass index is 38 27 kg/m²  Physical Exam  Not evaluated      Labs:  Results from last 7 days   Lab Units 08/20/22  1723 08/19/22  1304   WBC Thousand/uL 17 29* 13 25*   HEMOGLOBIN g/dL 12 3 12 8   MCV fL 87 87   PLATELETS Thousands/uL 289 287 Results from last 7 days   Lab Units 08/20/22  1723 08/19/22  1304   POTASSIUM mmol/L 3 8 4 1   CHLORIDE mmol/L 106 105   CO2 mmol/L 22 23   BUN mg/dL 7 5   CREATININE mg/dL 0 65 0 57*   EGFR ml/min/1 73sq m 124 130     Results from last 7 days   Lab Units 08/20/22  1723 08/19/22  1304   AST U/L 13 12*   ALT U/L 11 9   ALK PHOS U/L 77 84     Results from last 7 days   Lab Units 08/20/22  1723 08/19/22  1304   PLATELETS Thousands/uL 289 287         Results from last 7 days   Lab Units 08/20/22  1723 08/19/22  1304   PROT/CREAT RATIO UR  0 18* 0 14*     Fetal data:  Nonstress test: date 08/21/22   Baseline: 120  Variability: moderate  Accelerations: present, 15x15  Decelerations: absent  Contractions: absent  Assessment: reactive  Plan: continue TID and PRN    MFM ultrasound report key findings: See OB Procedures  EFW [id number] (90%WCG), cephalic presentation, amniotic fluid within normal limits  Imaging, EKG, Pathology, and Other Studies: Not applicable          Dalton Guthrie MD  8/21/2022  11:26 AM    60 minutes of floor time was devoted to consultation  My recommendations were discussed with Dr Claudette Harkins, as well as Rosario Hernandez and Alin Reynolds who are in agreement

## 2022-08-21 NOTE — ASSESSMENT & PLAN NOTE
Patient with hx of severe migraines; was previously on sumatriptan and venlafaxine  Last seen by Neurology outpatient in 2018  Consider Neurology consultation while inpatient if headaches do not improve   Consider neuroimaging if headache does not improve

## 2022-08-21 NOTE — PLAN OF CARE
Problem: ANTEPARTUM  Goal: Maintain pregnancy as long as maternal and/or fetal condition is stable  Description: INTERVENTIONS:  - Maternal surveillance  - Fetal surveillance  - Monitor uterine activity  - Medications as ordered  - Bedrest  Outcome: Progressing     Problem: PAIN - ADULT  Goal: Verbalizes/displays adequate comfort level or baseline comfort level  Description: Interventions:  - Encourage patient to monitor pain and request assistance  - Assess pain using appropriate pain scale  - Administer analgesics based on type and severity of pain and evaluate response  - Implement non-pharmacological measures as appropriate and evaluate response  - Consider cultural and social influences on pain and pain management  - Notify physician/advanced practitioner if interventions unsuccessful or patient reports new pain  Outcome: Progressing     Problem: INFECTION - ADULT  Goal: Absence or prevention of progression during hospitalization  Description: INTERVENTIONS:  - Assess and monitor for signs and symptoms of infection  - Monitor lab/diagnostic results  - Monitor all insertion sites, i e  indwelling lines, tubes, and drains  - Monitor endotracheal if appropriate and nasal secretions for changes in amount and color  - [place] appropriate cooling/warming therapies per order  - Administer medications as ordered  - Instruct and encourage patient and family to use good hand hygiene technique  - Identify and instruct in appropriate isolation precautions for identified infection/condition  Outcome: Progressing  Goal: Absence of fever/infection during neutropenic period  Description: INTERVENTIONS:  - Monitor WBC    Outcome: Progressing     Problem: SAFETY ADULT  Goal: Patient will remain free of falls  Description: INTERVENTIONS:  - Educate patient/family on patient safety including physical limitations  - Instruct patient to call for assistance with activity   - Consult OT/PT to assist with strengthening/mobility   - Keep Call bell within reach  - Keep bed low and locked with side rails adjusted as appropriate  - Keep care items and personal belongings within reach  - Initiate and maintain comfort rounds  - Make Fall Risk Sign visible to staff  - Offer Toileting every  Hours, in advance of need  - Initiate/Maintain alarm  - Obtain necessary fall risk management equipment:   - Apply yellow socks and bracelet for high fall risk patients  - Consider moving patient to room near nurses station  Outcome: Progressing  Goal: Maintain or return to baseline ADL function  Description: INTERVENTIONS:  -  Assess patient's ability to carry out ADLs; assess patient's baseline for ADL function and identify physical deficits which impact ability to perform ADLs (bathing, care of mouth/teeth, toileting, grooming, dressing, etc )  - Assess/evaluate cause of self-care deficits   - Assess range of motion  - Assess patient's mobility; develop plan if impaired  - Assess patient's need for assistive devices and provide as appropriate  - Encourage maximum independence but intervene and supervise when necessary  - Involve family in performance of ADLs  - Assess for home care needs following discharge   - Consider OT consult to assist with ADL evaluation and planning for discharge  - Provide patient education as appropriate  Outcome: Progressing  Goal: Maintains/Returns to pre admission functional level  Description: INTERVENTIONS:  - Perform BMAT or MOVE assessment daily    - Set and communicate daily mobility goal to care team and patient/family/caregiver  - Collaborate with rehabilitation services on mobility goals if consulted  - Perform Range of Motion  times a day  - Reposition patient every  hours    - Dangle patient  times a day  - Stand patient  times a day  - Ambulate patient  times a day  - Out of bed to chair  times a day   - Out of bed for meal times a day  - Out of bed for toileting  - Record patient progress and toleration of activity level   Outcome: Progressing     Problem: Knowledge Deficit  Goal: Patient/family/caregiver demonstrates understanding of disease process, treatment plan, medications, and discharge instructions  Description: Complete learning assessment and assess knowledge base    Interventions:  - Provide teaching at level of understanding  - Provide teaching via preferred learning methods  Outcome: Progressing     Problem: DISCHARGE PLANNING  Goal: Discharge to home or other facility with appropriate resources  Description: INTERVENTIONS:  - Identify barriers to discharge w/patient and caregiver  - Arrange for needed discharge resources and transportation as appropriate  - Identify discharge learning needs (meds, wound care, etc )  - Arrange for interpretive services to assist at discharge as needed  - Refer to Case Management Department for coordinating discharge planning if the patient needs post-hospital services based on physician/advanced practitioner order or complex needs related to functional status, cognitive ability, or social support system  Outcome: Progressing

## 2022-08-21 NOTE — ASSESSMENT & PLAN NOTE
Preeclampsia labs 8/19 and 8/20 WNL  Does not meet diagnostic criteria for preeclampsia at this time; however, if headache remains persistent, consider diagnosis of preeclampsia with severe features

## 2022-08-22 ENCOUNTER — HOSPITAL ENCOUNTER (INPATIENT)
Facility: HOSPITAL | Age: 27
LOS: 1 days | Discharge: HOME | End: 2022-08-25
Attending: STUDENT IN AN ORGANIZED HEALTH CARE EDUCATION/TRAINING PROGRAM | Admitting: STUDENT IN AN ORGANIZED HEALTH CARE EDUCATION/TRAINING PROGRAM
Payer: COMMERCIAL

## 2022-08-22 ENCOUNTER — TELEPHONE (OUTPATIENT)
Dept: OBGYN CLINIC | Facility: CLINIC | Age: 27
End: 2022-08-22

## 2022-08-22 ENCOUNTER — APPOINTMENT (OUTPATIENT)
Dept: MRI IMAGING | Facility: HOSPITAL | Age: 27
End: 2022-08-22
Payer: COMMERCIAL

## 2022-08-22 DIAGNOSIS — O13.3 GESTATIONAL HYPERTENSION, THIRD TRIMESTER: ICD-10-CM

## 2022-08-22 DIAGNOSIS — R51.9 PERSISTENT HEADACHES: Primary | ICD-10-CM

## 2022-08-22 PROBLEM — Z3A.32 32 WEEKS GESTATION OF PREGNANCY: Status: RESOLVED | Noted: 2022-08-19 | Resolved: 2022-08-22

## 2022-08-22 LAB
ALBUMIN SERPL BCP-MCNC: 3.6 G/DL (ref 3.5–5)
ALP SERPL-CCNC: 71 U/L (ref 34–104)
ALT SERPL W P-5'-P-CCNC: 11 U/L (ref 7–52)
ANION GAP SERPL CALCULATED.3IONS-SCNC: 10 MMOL/L (ref 4–13)
AST SERPL W P-5'-P-CCNC: 12 U/L (ref 13–39)
BACTERIA UR CULT: NORMAL
BILIRUB SERPL-MCNC: 0.27 MG/DL (ref 0.2–1)
BUN SERPL-MCNC: 8 MG/DL (ref 5–25)
CALCIUM SERPL-MCNC: 9.5 MG/DL (ref 8.4–10.2)
CHLORIDE SERPL-SCNC: 107 MMOL/L (ref 96–108)
CO2 SERPL-SCNC: 22 MMOL/L (ref 21–32)
CREAT SERPL-MCNC: 0.61 MG/DL (ref 0.6–1.3)
CREAT UR-MCNC: 210.2 MG/DL
ERYTHROCYTE [DISTWIDTH] IN BLOOD BY AUTOMATED COUNT: 13 % (ref 11.6–15.1)
GFR SERPL CREATININE-BSD FRML MDRD: 127 ML/MIN/1.73SQ M
GLUCOSE SERPL-MCNC: 104 MG/DL (ref 65–140)
HCT VFR BLD AUTO: 35.4 % (ref 34.8–46.1)
HGB BLD-MCNC: 12.1 G/DL (ref 11.5–15.4)
MCH RBC QN AUTO: 29.9 PG (ref 26.8–34.3)
MCHC RBC AUTO-ENTMCNC: 34.2 G/DL (ref 31.4–37.4)
MCV RBC AUTO: 87 FL (ref 82–98)
PLATELET # BLD AUTO: 286 THOUSANDS/UL (ref 149–390)
PMV BLD AUTO: 10.3 FL (ref 8.9–12.7)
POTASSIUM SERPL-SCNC: 3.9 MMOL/L (ref 3.5–5.3)
PROT SERPL-MCNC: 6.6 G/DL (ref 6.4–8.4)
PROT UR-MCNC: 37 MG/DL
PROT/CREAT UR: 0.18 MG/G{CREAT} (ref 0–0.1)
RBC # BLD AUTO: 4.05 MILLION/UL (ref 3.81–5.12)
SODIUM SERPL-SCNC: 139 MMOL/L (ref 135–147)
WBC # BLD AUTO: 13.05 THOUSAND/UL (ref 4.31–10.16)

## 2022-08-22 PROCEDURE — 99213 OFFICE O/P EST LOW 20 MIN: CPT

## 2022-08-22 PROCEDURE — G1004 CDSM NDSC: HCPCS

## 2022-08-22 PROCEDURE — 70551 MRI BRAIN STEM W/O DYE: CPT

## 2022-08-22 PROCEDURE — 84156 ASSAY OF PROTEIN URINE: CPT

## 2022-08-22 PROCEDURE — 80053 COMPREHEN METABOLIC PANEL: CPT

## 2022-08-22 PROCEDURE — 70544 MR ANGIOGRAPHY HEAD W/O DYE: CPT

## 2022-08-22 PROCEDURE — 82570 ASSAY OF URINE CREATININE: CPT

## 2022-08-22 PROCEDURE — 99244 OFF/OP CNSLTJ NEW/EST MOD 40: CPT | Performed by: PSYCHIATRY & NEUROLOGY

## 2022-08-22 PROCEDURE — 85027 COMPLETE CBC AUTOMATED: CPT

## 2022-08-22 RX ORDER — ACETAMINOPHEN 325 MG/1
975 TABLET ORAL EVERY 6 HOURS PRN
Status: DISCONTINUED | OUTPATIENT
Start: 2022-08-22 | End: 2022-08-25 | Stop reason: HOSPADM

## 2022-08-22 RX ORDER — DIPHENHYDRAMINE HYDROCHLORIDE 50 MG/ML
25 INJECTION INTRAMUSCULAR; INTRAVENOUS ONCE
Status: COMPLETED | OUTPATIENT
Start: 2022-08-22 | End: 2022-08-22

## 2022-08-22 RX ORDER — MAGNESIUM SULFATE HEPTAHYDRATE 40 MG/ML
2 INJECTION, SOLUTION INTRAVENOUS ONCE
Status: COMPLETED | OUTPATIENT
Start: 2022-08-22 | End: 2022-08-22

## 2022-08-22 RX ORDER — GABAPENTIN 100 MG/1
100 CAPSULE ORAL 3 TIMES DAILY
Status: DISCONTINUED | OUTPATIENT
Start: 2022-08-22 | End: 2022-08-23

## 2022-08-22 RX ORDER — METOCLOPRAMIDE HYDROCHLORIDE 5 MG/ML
10 INJECTION INTRAMUSCULAR; INTRAVENOUS ONCE
Status: COMPLETED | OUTPATIENT
Start: 2022-08-22 | End: 2022-08-22

## 2022-08-22 RX ADMIN — SODIUM CHLORIDE 500 MG: 0.9 INJECTION, SOLUTION INTRAVENOUS at 14:11

## 2022-08-22 RX ADMIN — GABAPENTIN 100 MG: 100 CAPSULE ORAL at 17:54

## 2022-08-22 RX ADMIN — MAGNESIUM SULFATE HEPTAHYDRATE 2 G: 40 INJECTION, SOLUTION INTRAVENOUS at 12:54

## 2022-08-22 RX ADMIN — SODIUM CHLORIDE, SODIUM LACTATE, POTASSIUM CHLORIDE, AND CALCIUM CHLORIDE 500 ML: .6; .31; .03; .02 INJECTION, SOLUTION INTRAVENOUS at 12:58

## 2022-08-22 RX ADMIN — DIPHENHYDRAMINE HYDROCHLORIDE 25 MG: 50 INJECTION, SOLUTION INTRAMUSCULAR; INTRAVENOUS at 12:52

## 2022-08-22 RX ADMIN — METOCLOPRAMIDE 10 MG: 5 INJECTION, SOLUTION INTRAMUSCULAR; INTRAVENOUS at 12:53

## 2022-08-22 RX ADMIN — ACETAMINOPHEN 975 MG: 325 TABLET ORAL at 21:05

## 2022-08-22 NOTE — PLAN OF CARE
Problem: ANTEPARTUM  Goal: Maintain pregnancy as long as maternal and/or fetal condition is stable  Description: INTERVENTIONS:  - Maternal surveillance  - Fetal surveillance  - Monitor uterine activity  - Medications as ordered  - Bedrest  Outcome: Completed     Problem: PAIN - ADULT  Goal: Verbalizes/displays adequate comfort level or baseline comfort level  Description: Interventions:  - Encourage patient to monitor pain and request assistance  - Assess pain using appropriate pain scale  - Administer analgesics based on type and severity of pain and evaluate response  - Implement non-pharmacological measures as appropriate and evaluate response  - Consider cultural and social influences on pain and pain management  - Notify physician/advanced practitioner if interventions unsuccessful or patient reports new pain  Outcome: Completed     Problem: INFECTION - ADULT  Goal: Absence or prevention of progression during hospitalization  Description: INTERVENTIONS:  - Assess and monitor for signs and symptoms of infection  - Monitor lab/diagnostic results  - Monitor all insertion sites, i e  indwelling lines, tubes, and drains  - Monitor endotracheal if appropriate and nasal secretions for changes in amount and color  - [place] appropriate cooling/warming therapies per order  - Administer medications as ordered  - Instruct and encourage patient and family to use good hand hygiene technique  - Identify and instruct in appropriate isolation precautions for identified infection/condition  Outcome: Completed  Goal: Absence of fever/infection during neutropenic period  Description: INTERVENTIONS:  - Monitor WBC    Outcome: Completed     Problem: SAFETY ADULT  Goal: Patient will remain free of falls  Description: INTERVENTIONS:  - Educate patient/family on patient safety including physical limitations  - Instruct patient to call for assistance with activity   - Consult OT/PT to assist with strengthening/mobility   - Keep Call bell within reach  - Keep bed low and locked with side rails adjusted as appropriate  - Keep care items and personal belongings within reach  - Initiate and maintain comfort rounds  - Make Fall Risk Sign visible to staff  - Apply yellow socks and bracelet for high fall risk patients  - Consider moving patient to room near nurses station  Outcome: Completed  Goal: Maintain or return to baseline ADL function  Description: INTERVENTIONS:  -  Assess patient's ability to carry out ADLs; assess patient's baseline for ADL function and identify physical deficits which impact ability to perform ADLs (bathing, care of mouth/teeth, toileting, grooming, dressing, etc )  - Assess/evaluate cause of self-care deficits   - Assess range of motion  - Assess patient's mobility; develop plan if impaired  - Assess patient's need for assistive devices and provide as appropriate  - Encourage maximum independence but intervene and supervise when necessary  - Involve family in performance of ADLs  - Assess for home care needs following discharge   - Consider OT consult to assist with ADL evaluation and planning for discharge  - Provide patient education as appropriate  Outcome: Completed  Goal: Maintains/Returns to pre admission functional level  Description: INTERVENTIONS:  - Perform BMAT or MOVE assessment daily    - Set and communicate daily mobility goal to care team and patient/family/caregiver  - Collaborate with rehabilitation services on mobility goals if consulted  - Out of bed for toileting  - Record patient progress and toleration of activity level   Outcome: Completed     Problem: Knowledge Deficit  Goal: Patient/family/caregiver demonstrates understanding of disease process, treatment plan, medications, and discharge instructions  Description: Complete learning assessment and assess knowledge base    Interventions:  - Provide teaching at level of understanding  - Provide teaching via preferred learning methods  Outcome: Completed     Problem: DISCHARGE PLANNING  Goal: Discharge to home or other facility with appropriate resources  Description: INTERVENTIONS:  - Identify barriers to discharge w/patient and caregiver  - Arrange for needed discharge resources and transportation as appropriate  - Identify discharge learning needs (meds, wound care, etc )  - Arrange for interpretive services to assist at discharge as needed  - Refer to Case Management Department for coordinating discharge planning if the patient needs post-hospital services based on physician/advanced practitioner order or complex needs related to functional status, cognitive ability, or social support system  Outcome: Completed

## 2022-08-22 NOTE — PLAN OF CARE
Problem: ANTEPARTUM  Goal: Maintain pregnancy as long as maternal and/or fetal condition is stable  Description: INTERVENTIONS:  - Maternal surveillance  - Fetal surveillance  - Monitor uterine activity  - Medications as ordered  - Bedrest  Outcome: Not Progressing

## 2022-08-22 NOTE — H&P
[place] [age] y o  female MRN: [id number]  Unit/Bed#: -01 Encounter: [id number]    Assessment & Plan:   Persistent headaches  Assessment & Plan  Neurology consulted  Has received a heachae cocktail (2g Mag, 10mg IV Reglan, 25mg IV Benadryl, 500mg Solumedrol)   Now on Gabapentin 100mg TID, SoluMedrol 500mg BID per Neurology   MRI head, MRI brain pending   Admit for observation  * Gestational hypertension, third trimester  Assessment & Plan  Systolic (96OSF), PIZ:846 , Min:120 , JII:648     Diastolic (71PVP), OYP:56, Min:58, Max:70    PreE Labs wnl   P:C ratio:           Discussed case and plan w/ Dr Gabbi Hutchins      Chief Complaint: headache    HPI: Adi Jade is a 27 y o  [id number] with an FREDY of 10/15/2022, by Last Menstrual Period at [place] [id number] who is being admitted for intractable headache  She denies having uterine contractions, has no LOF, and reports no VB  She states she has felt good FM  She has a diagnosis of GHTN She was here on both Friday afternoon and Saturday night into Sunday with the same complaint  On Friday she received solumderol, reglan, mag and it improved and she was discharged home  Her headache came back within a few hours and so she presented again on Saturday  Received reglan, benadryl, solumedrol, mag again with no relief  By Sunday her headache was a 5 (from an 8 on Saturday) and she requested discharge home  She was discharged with reglan and tylenol which she has taken once without relief  It's still at a 5  On a home blood pressure cuff she reports a pressure of 146/85  Her pressures here today are 130s/70s  Of note she has a history of migraines  Earlier in the pregnancy she was taking fioricet which was providing relief before last week      Patient Active Problem List   Diagnosis    Gestational hypertension, third trimester    Hx of migraines    Persistent headaches       Baby complications/comments: none    Review of Systems Constitutional: Negative for chills and fever  Respiratory: Negative for cough, shortness of breath and wheezing  Cardiovascular: Negative for chest pain and leg swelling  Gastrointestinal: Negative for abdominal pain, diarrhea, nausea and vomiting  Genitourinary: Negative for pelvic pain, vaginal bleeding and vaginal discharge  Musculoskeletal: Negative for back pain  Neurological: Positive for headaches  Negative for weakness and light-headedness  OB Hx:  OB History    Para Term  AB Living   3       2     SAB IAB Ectopic Multiple Live Births   2              # Outcome Date GA Lbr Sadiq/2nd Weight Sex Delivery Anes PTL Lv   3 Current            2  2019           1 2017               Past Medical Hx:  No past medical history on file  Past Surgical hx:  Past Surgical History:   Procedure Laterality Date    TONSILLECTOMY      WISDOM TOOTH EXTRACTION           Allergies   Allergen Reactions    Latex Hives and Rash    Adhesive [Medical Tape] Rash     Redness, rash, possible Latex allergy as well       Medications Prior to Admission   Medication    acetaminophen (TYLENOL) 325 mg tablet    metoclopramide (Reglan) 10 mg tablet    Prenatal Vit-Fe Fumarate-FA (PRENATAL VITAMIN PO)       Objective:  Temp:  [98 3 °F (36 8 °C)] 98 3 °F (36 8 °C)  HR:  [75-83] 83  Resp:  [18] 18  BP: (120-137)/(58-70) 120/66  There is no height or weight on file to calculate BMI  Physical Exam:  Physical Exam  Constitutional:       Appearance: Normal appearance  Cardiovascular:      Rate and Rhythm: Normal rate and regular rhythm  Pulmonary:      Effort: Pulmonary effort is normal  No respiratory distress  Abdominal:      Palpations: Abdomen is soft  Tenderness: There is no abdominal tenderness  Musculoskeletal:         General: No swelling or tenderness  Right lower leg: No edema  Left lower leg: No edema  Neurological:      General: No focal deficit present  Mental Status: She is alert and oriented to person, place, and time  Mental status is at baseline  Skin:     General: Skin is warm and dry  Psychiatric:         Mood and Affect: Mood normal    Vitals reviewed              FHT:  Baseline Rate: 130 bpm  Variability: Moderate 6-25 bpm  Accelerations: 15 x 15 or greater, At variable times  Decelerations: None  FHR Category: Category I    TOCO:   Contraction Frequency (minutes): none    Lab Results   Component Value Date    WBC 13 05 (H) 08/22/2022    HGB 12 1 08/22/2022    HCT 35 4 08/22/2022     08/22/2022     Lab Results   Component Value Date    K 3 9 08/22/2022     08/22/2022    CO2 22 08/22/2022    BUN 8 08/22/2022    CREATININE 0 61 08/22/2022    AST 12 (L) 08/22/2022    ALT 11 08/22/2022     <2 Midnights  OBSERVATION    Signature/Title: Марина Calvillo MD  Date: 8/22/2022  Time: 5:51 PM

## 2022-08-22 NOTE — CONSULTS
Consultation - Neurology   Ryan Pollack 27 y o  female MRN: [id number]  Unit/Bed#: -01 Encounter: [id number]      Assessment/Plan   Persistent headaches  Assessment & Plan  Ryan Pollack is a 27 y o   female at 32w1d with migraines and gestational hypertension who presents to [facility] L&D on 2022 with persistent headache  Intractable headache in a  patient with a history of migraines  Suspicion for intractable migraine with cervicogenic component  Will obtain MRI brain and MRV head to rule out intracranial pathology/Cerebral venous sinus thrombosis   Plan:   - MRI brain wo pending   - MRV head wo pending   - S/p IV Benadryl, IV Mag sulfate, IV Reglan   - Headache regimen  · Defer continuation of magnesium sulfate to OBGYN  · Recommend Solumedrol 500 BID starting   · Recommend Gabapentin 100 TID   · Aqua K  - Would not recommend frequent use of Fioricet   - BP management per primary team, goal normotension   - Medical management and supportive care per primary team, notify with changes  - Follow up with outpatient neurology headache team in 4-6 weeks    Ryan Pollack will need follow up in in 4 weeks with headache attending or advance practitioner  She will not require outpatient neurological testing  History of Present Illness     Reason for Consult / Principal Problem: Persistent headache     HPI: Ryan Pollack is a 27 y o   female at 32w1d with migraines and gestational hypertension who presents to [facility] L&D on 2022 with persistent headache  Per chart review and per discussion with vera, patient contacted L&D and reported developing a left temporal headache the night of 2022 which she reported was similar to her history of migraines  She reported taking Fioricet without relief, last taken on   Patient stteas she was not taking Fioricet regularly, at most twice a week   Patient presented to L&D triage on 08/19/2022 with the headache and was given Tylenol, Reglan, Solumedrol, and magnesium  Patient reported some improvement in the headache after the medications and was then able to eat  Patient later on reported a pain scale of 1/10 and was discharged on 08/19  Patient meet the criteria for gHTN during this visit with a BP of 150/83  Patient contacted L&D again on 08/20, stating her headache was back and her BP was 148/90  Patient was instructed to come in for evaluation  Patient stated that the headache was now frontal, admitting to a tight band sensation  Patient also admits to some neck pain, stating she sees a chiropractor for her headaches  She last saw her chiropractor 1 week ago and states she only had her neck massaged and admitted to significant relief  She received IV mag sulfate, solumedrol, Reglan, and Tylenol  Patient states on Saturday 08/20 when her headache was the worst, she felt significant cramping pain in her legs bilaterally, stating "it felt like I just ran a marathon"  Patient again noted improvement in the headache after she was able to eat  She states her headache did not resolve like the day before but asked to be discharged for her baby shower  She was discharged on 08/21/2022  Patient contacted L&D again on 08/22/2022 stating that her headache was back  She had been taking Reglan and Tylenol without relief and her BP was 146/85 this morning  Patient admits to photosensitivity and nausea  She also admits to dizziness which she describes as getting lightheaded, even while in a seated position  Patient denies chest pain, SOB, abdominal pain, weakness, numbness, tingling  Inpatient consult to Neurology  Consult performed by: Alix Sam PA-C  Consult ordered by: Deacon Sheldon MD        Review of Systems  12 point ROS performed, as stated above, all others negative  Historical Information   No past medical history on file    Past Surgical History:   Procedure Laterality Date  TONSILLECTOMY      WISDOM TOOTH EXTRACTION       Social History   Social History     Substance and Sexual Activity   Alcohol Use Not Currently     Social History     Substance and Sexual Activity   Drug Use Never     E-Cigarette/Vaping    E-Cigarette Use Never User      E-Cigarette/Vaping Substances    Nicotine No     THC No     CBD No     Flavoring No     Other No     Unknown No      Social History     Tobacco Use   Smoking Status Never Smoker   Smokeless Tobacco Never Used     Family History:   Family History   Problem Relation Age of Onset    Diabetes Mother     Diabetes Maternal Grandmother     Cancer Maternal Grandfather         prostate? colon? Review of previous medical records was completed  Meds/Allergies   all current active meds have been reviewed, current meds:   Current Facility-Administered Medications   Medication Dose Route Frequency    gabapentin (NEURONTIN) capsule 100 mg  100 mg Oral TID    [START ON 8/23/2022] methylPREDNISolone sodium succinate (Solu-MEDROL) 500 mg in sodium chloride 0 9 % 250 mL IVPB  500 mg Intravenous Q12H [address]   , PTA meds:   Prior to Admission Medications   Prescriptions Last Dose Informant Patient Reported? Taking?    Prenatal Vit-Fe Fumarate-FA (PRENATAL VITAMIN PO) 8/22/2022 at Unknown time  Yes Yes   Sig: Take by mouth   acetaminophen (TYLENOL) 325 mg tablet 8/21/2022 at 2130  No Yes   Sig: Take 3 tablets (975 mg total) by mouth every 8 (eight) hours as needed for mild pain, headaches or fever   metoclopramide (Reglan) 10 mg tablet 8/21/2022 at 2130  No Yes   Sig: Take 1 tablet (10 mg total) by mouth every 6 (six) hours as needed (headache)      Facility-Administered Medications: None    and     Allergies   Allergen Reactions    Latex Hives and Rash    Adhesive [Medical Tape] Rash     Redness, rash, possible Latex allergy as well       Objective   Vitals:Blood pressure 120/66, pulse 83, temperature 98 3 °F (36 8 °C), temperature source Oral, resp  rate 18, last menstrual period 01/08/2022  ,There is no height or weight on file to calculate BMI  No intake or output data in the 24 hours ending 08/22/22 1520    Invasive Devices: Invasive Devices  Report    Peripheral Intravenous Line  Duration           Peripheral IV 08/22/22 Left Antecubital <1 day              Physical Exam  Vitals and nursing note reviewed  Constitutional:       General: She is not in acute distress  Appearance: Normal appearance  She is obese  She is not ill-appearing, toxic-appearing or diaphoretic  HENT:      Head: Normocephalic and atraumatic  Eyes:      General: No scleral icterus  Right eye: No discharge  Left eye: No discharge  Extraocular Movements: Extraocular movements intact and EOM normal       Conjunctiva/sclera: Conjunctivae normal       Pupils: Pupils are equal, round, and reactive to light  Neck:      Comments: Tenderness to palpation of left paraspinal muscles   Musculoskeletal:      Cervical back: Normal range of motion and neck supple  Skin:     General: Skin is warm and dry  Coloration: Skin is not jaundiced or pale  Findings: No bruising, erythema, lesion or rash  Neurological:      Mental Status: She is alert  Deep Tendon Reflexes: Strength normal    Psychiatric:         Mood and Affect: Mood normal          Behavior: Behavior normal          Thought Content: Thought content normal          Judgment: Judgment normal        Neurologic Exam     Mental Status   Patient is alert, lying in bed, accompanied by spouse  Oriented x 3  No dysarthria or aphasia noted  Able to follow central and appendicular commands, and answers all questions appropriately  Cranial Nerves     CN II   Visual fields full to confrontation  CN III, IV, VI   Pupils are equal, round, and reactive to light    Extraocular motions are normal    Nystagmus: none   Upgaze: normal  Downgaze: normal  Conjugate gaze: present    CN V   Facial sensation intact  CN VII   Facial expression full, symmetric  CN VIII   Hearing: intact    CN IX, X   Palate: symmetric    CN XI   CN XI normal      CN XII   Tongue deviation: none    Motor Exam   Muscle bulk: normal  Overall muscle tone: normal  Right arm pronator drift: absent  Left arm pronator drift: absent    Strength   Strength 5/5 throughout  Sensory Exam   Light touch normal    Temperate sensation intact throughout   No evidence of extinction with bilateral simultaneous stimulation      Gait, Coordination, and Reflexes     Tremor   Resting tremor: absent    Reflexes   Right plantar: normal  Left plantar: normal  No ataxia or dysmetria noted in BUE finger to nose testing  RUE reflexes 2+   LUE reflexes 1 +  Right patellar and achilles reflexes 1+  Left patellar and achilles reflexes 2+  No involuntary movements noted throughout exam      Lab Results: I have personally reviewed pertinent reports    Recent Results (from the past 24 hour(s))   Protein / creatinine ratio, urine    Collection Time: 08/22/22 11:32 AM   Result Value Ref Range    Creatinine, Ur 210 2 mg/dL    Protein Urine Random 37 mg/dL    Prot/Creat Ratio, Ur 0 18 (H) 0 00 - 0 10   CBC and Platelet    Collection Time: 08/22/22 11:32 AM   Result Value Ref Range    WBC 13 05 (H) 4 31 - 10 16 Thousand/uL    RBC 4 05 3 81 - 5 12 Million/uL    Hemoglobin 12 1 11 5 - 15 4 g/dL    Hematocrit 35 4 34 8 - 46 1 %    MCV 87 82 - 98 fL    MCH 29 9 26 8 - 34 3 pg    MCHC 34 2 31 4 - 37 4 g/dL    RDW 13 0 11 6 - 15 1 %    Platelets 544 919 - 343 Thousands/uL    MPV 10 3 8 9 - 12 7 fL   Comprehensive metabolic panel    Collection Time: 08/22/22 11:32 AM   Result Value Ref Range    Sodium 139 135 - 147 mmol/L    Potassium 3 9 3 5 - 5 3 mmol/L    Chloride 107 96 - 108 mmol/L    CO2 22 21 - 32 mmol/L    ANION GAP 10 4 - 13 mmol/L    BUN 8 5 - 25 mg/dL    Creatinine 0 61 0 60 - 1 30 mg/dL    Glucose 104 65 - 140 mg/dL    Calcium 9 5 8 4 - 10 2 mg/dL AST 12 (L) 13 - 39 U/L    ALT 11 7 - 52 U/L    Alkaline Phosphatase 71 34 - 104 U/L    Total Protein 6 6 6 4 - 8 4 g/dL    Albumin 3 6 3 5 - 5 0 g/dL    Total Bilirubin 0 27 0 20 - 1 00 mg/dL    eGFR 127 ml/min/1 73sq m   ]  Imaging Studies: I have personally reviewed pertinent reports and I have personally reviewed pertinent films in PACS  EKG, Pathology, and Other Studies: I have personally reviewed pertinent reports  Dictation voice to text software has been used in the creation of this document  Please consider this in light of any contextual or grammatical errors

## 2022-08-22 NOTE — ASSESSMENT & PLAN NOTE
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 hydrocephalus, previously diagnoed in 2018 per discussion with patient  Unlikely Chiari I malformation is attributing to headache  Suspicion for atypical migraine with cervicogenic component, likely in the setting of hormonal changes and fluid shifts of pregnancy  Low suspicion for preeclamptic origin per discussion with maternal fetal medicine       Plan:  - S/p IV Benadryl, IV Mag sulfate, IV Reglan   - Headache regimen  · Defer continuation of magnesium sulfate to OBGYN  · S/p Solumedrol 500 BID x 2 doses ()   · No improvement with Gabapentin 100 TID, medication discontinued  · Trialed sumatriptan subcu 6 mg x 1 and caffeine sodium benzoate 500 mg x1 with some improvement   · Continue Acetaminophen PRN  · Aqua K  · Started on prophylactic medication Cyproheptadine 4 mg TID  · Avoid beta blockers and calcium channel blockers as medications may mask HTN  · Would not recommend frequent use of Fioricet   - BP management per primary team, goal normotension   - Medical management and supportive care per primary team, notify with changes  - Follow up with outpatient neurology headache team in 4-6 weeks

## 2022-08-22 NOTE — ASSESSMENT & PLAN NOTE
Pt unable to urinate at this time. Pt requests not to have pregnancy test completed. MD doherty   Systolic (08RIX), MVH:882 , Min:120 , BUQ:263     Diastolic (38NJO), XZN:54, Min:58, Max:70    CBC and CMP wnl  Urine P:C ratio: 0 18  Only symptom is headache (no chest pain, SOB, RUQ/epigastric pain, or increased swelling)      - Follow up 24 hr urine protein  - Monitor BPs

## 2022-08-22 NOTE — ASSESSMENT & PLAN NOTE
Neurology consulted  Has received a heachae cocktail (2g Mag, 10mg IV Reglan, 25mg IV Benadryl, 500mg Solumedrol)  Patient feels her headache is worse this morning, now 6/10 as opposed to 4/10 on admission (frontal, bilateral though worse on right)    - Gabapentin 100 mg TID  - SoluMedrol 500 mg BID per Neurology  - MRI head, MRI brain completed, follow up final read  - Consult OLIVIA

## 2022-08-22 NOTE — TELEPHONE ENCOUNTER
Pt calling states was in L&D for elevated BP and ACHARYA Friday was given medication and d/c'd then ACHARYA returned and she returned Saturday and was kept overnight and slight improvement with HA but was d/c'd yesterday and now calling that HA has remained persistent, she has been taking the reglan and tylenol as instructed with no relief, BP this morning 146/85, denies any visual changes denies any swelling  Pt aware will review with on call provider at this time  TT sent to on call provider

## 2022-08-23 PROCEDURE — 99218 PR INITIAL OBSERVATION CARE/DAY 30 MINUTES: CPT | Performed by: STUDENT IN AN ORGANIZED HEALTH CARE EDUCATION/TRAINING PROGRAM

## 2022-08-23 PROCEDURE — NC001 PR NO CHARGE: Performed by: OBSTETRICS & GYNECOLOGY

## 2022-08-23 PROCEDURE — 59025 FETAL NON-STRESS TEST: CPT | Performed by: OBSTETRICS & GYNECOLOGY

## 2022-08-23 PROCEDURE — 87150 DNA/RNA AMPLIFIED PROBE: CPT

## 2022-08-23 PROCEDURE — 99213 OFFICE O/P EST LOW 20 MIN: CPT | Performed by: PSYCHIATRY & NEUROLOGY

## 2022-08-23 PROCEDURE — 99244 OFF/OP CNSLTJ NEW/EST MOD 40: CPT | Performed by: OBSTETRICS & GYNECOLOGY

## 2022-08-23 RX ORDER — CALCIUM CARBONATE 200(500)MG
500 TABLET,CHEWABLE ORAL DAILY PRN
Status: DISCONTINUED | OUTPATIENT
Start: 2022-08-23 | End: 2022-08-25 | Stop reason: HOSPADM

## 2022-08-23 RX ORDER — CYCLOBENZAPRINE HCL 10 MG
10 TABLET ORAL 3 TIMES DAILY PRN
Status: DISCONTINUED | OUTPATIENT
Start: 2022-08-23 | End: 2022-08-25 | Stop reason: HOSPADM

## 2022-08-23 RX ORDER — SUMATRIPTAN 6 MG/.5ML
6 INJECTION, SOLUTION SUBCUTANEOUS ONCE
Status: COMPLETED | OUTPATIENT
Start: 2022-08-23 | End: 2022-08-23

## 2022-08-23 RX ORDER — ONDANSETRON 2 MG/ML
4 INJECTION INTRAMUSCULAR; INTRAVENOUS EVERY 6 HOURS PRN
Status: DISCONTINUED | OUTPATIENT
Start: 2022-08-23 | End: 2022-08-25 | Stop reason: HOSPADM

## 2022-08-23 RX ADMIN — ONDANSETRON 4 MG: 2 INJECTION INTRAMUSCULAR; INTRAVENOUS at 07:38

## 2022-08-23 RX ADMIN — SODIUM CHLORIDE 500 MG: 0.9 INJECTION, SOLUTION INTRAVENOUS at 09:59

## 2022-08-23 RX ADMIN — MAGNESIUM OXIDE TAB 400 MG (241.3 MG ELEMENTAL MG) 400 MG: 400 (241.3 MG) TAB at 18:14

## 2022-08-23 RX ADMIN — CYCLOBENZAPRINE HYDROCHLORIDE 10 MG: 10 TABLET, FILM COATED ORAL at 15:24

## 2022-08-23 RX ADMIN — SUMATRIPTAN 6 MG: 6 INJECTION, SOLUTION SUBCUTANEOUS at 13:59

## 2022-08-23 RX ADMIN — GABAPENTIN 100 MG: 100 CAPSULE ORAL at 01:53

## 2022-08-23 RX ADMIN — ACETAMINOPHEN 975 MG: 325 TABLET ORAL at 18:12

## 2022-08-23 RX ADMIN — ACETAMINOPHEN 975 MG: 325 TABLET ORAL at 11:31

## 2022-08-23 RX ADMIN — SODIUM CHLORIDE 500 MG: 0.9 INJECTION, SOLUTION INTRAVENOUS at 18:41

## 2022-08-23 RX ADMIN — ACETAMINOPHEN 975 MG: 325 TABLET ORAL at 05:07

## 2022-08-23 RX ADMIN — ACETAMINOPHEN 975 MG: 325 TABLET ORAL at 23:36

## 2022-08-23 RX ADMIN — CAFFEINE AND SODIUM BENZOATE 500 MG: 125 INJECTION, SOLUTION INTRAMUSCULAR; INTRAVENOUS at 12:45

## 2022-08-23 NOTE — PLAN OF CARE
Problem: ANTEPARTUM  Goal: Maintain pregnancy as long as maternal and/or fetal condition is stable  Description: INTERVENTIONS:  - Maternal surveillance  - Fetal surveillance  - Monitor uterine activity  - Medications as ordered  - Bedrest  Outcome: Progressing     Problem: PAIN - ADULT  Goal: Verbalizes/displays adequate comfort level or baseline comfort level  Description: Interventions:  - Encourage patient to monitor pain and request assistance  - Assess pain using appropriate pain scale  - Administer analgesics based on type and severity of pain and evaluate response  - Implement non-pharmacological measures as appropriate and evaluate response  - Consider cultural and social influences on pain and pain management  - Notify physician/advanced practitioner if interventions unsuccessful or patient reports new pain  Outcome: Progressing     Problem: INFECTION - ADULT  Goal: Absence or prevention of progression during hospitalization  Description: INTERVENTIONS:  - Assess and monitor for signs and symptoms of infection  - Monitor lab/diagnostic results  - Monitor all insertion sites, i e  indwelling lines, tubes, and drains  - Monitor endotracheal if appropriate and nasal secretions for changes in amount and color  - [place] appropriate cooling/warming therapies per order  - Administer medications as ordered  - Instruct and encourage patient and family to use good hand hygiene technique  - Identify and instruct in appropriate isolation precautions for identified infection/condition  Outcome: Progressing  Goal: Absence of fever/infection during neutropenic period  Description: INTERVENTIONS:  - Monitor WBC    Outcome: Progressing     Problem: SAFETY ADULT  Goal: Patient will remain free of falls  Description: INTERVENTIONS:  - Educate patient/family on patient safety including physical limitations  - Instruct patient to call for assistance with activity   - Consult OT/PT to assist with strengthening/mobility   - Keep Call bell within reach  - Keep bed low and locked with side rails adjusted as appropriate  - Keep care items and personal belongings within reach  - Initiate and maintain comfort rounds  - Make Fall Risk Sign visible to staff  - Apply yellow socks and bracelet for high fall risk patients  - Consider moving patient to room near nurses station  Outcome: Progressing  Goal: Maintain or return to baseline ADL function  Description: INTERVENTIONS:  -  Assess patient's ability to carry out ADLs; assess patient's baseline for ADL function and identify physical deficits which impact ability to perform ADLs (bathing, care of mouth/teeth, toileting, grooming, dressing, etc )  - Assess/evaluate cause of self-care deficits   - Assess range of motion  - Assess patient's mobility; develop plan if impaired  - Assess patient's need for assistive devices and provide as appropriate  - Encourage maximum independence but intervene and supervise when necessary  - Involve family in performance of ADLs  - Assess for home care needs following discharge   - Consider OT consult to assist with ADL evaluation and planning for discharge  - Provide patient education as appropriate  Outcome: Progressing  Goal: Maintains/Returns to pre admission functional level  Description: INTERVENTIONS:  - Perform BMAT or MOVE assessment daily    - Set and communicate daily mobility goal to care team and patient/family/caregiver  - Collaborate with rehabilitation services on mobility goals if consulted  - Out of bed for toileting  - Record patient progress and toleration of activity level   Outcome: Progressing     Problem: Knowledge Deficit  Goal: Patient/family/caregiver demonstrates understanding of disease process, treatment plan, medications, and discharge instructions  Description: Complete learning assessment and assess knowledge base    Interventions:  - Provide teaching at level of understanding  - Provide teaching via preferred learning methods  Outcome: Progressing     Problem: DISCHARGE PLANNING  Goal: Discharge to home or other facility with appropriate resources  Description: INTERVENTIONS:  - Identify barriers to discharge w/patient and caregiver  - Arrange for needed discharge resources and transportation as appropriate  - Identify discharge learning needs (meds, wound care, etc )  - Arrange for interpretive services to assist at discharge as needed  - Refer to Case Management Department for coordinating discharge planning if the patient needs post-hospital services based on physician/advanced practitioner order or complex needs related to functional status, cognitive ability, or social support system  Outcome: Progressing

## 2022-08-23 NOTE — ASSESSMENT & PLAN NOTE
Normotensive during admission  No other symptoms - vision changes, RUQ/epigastric pain, chest pain, shortness of breath, lower extremity pain  24h urine protein 266, P/Cr 0 18 - unchanged from previous  Will need twice weekly NST when discharged    Systolic (39XIP), TBI:719 , Min:116 , ZWR:169   Diastolic (95ODV), W, Min:56, Max:65    Lab Results   Component Value Date    ALT 10 2022    AST 9 (L) 2022    ALKPHOS 60 2022

## 2022-08-23 NOTE — CONSULTS
Consultation - Maternal Fetal Medicine   Jessi Pierson 27 y o  female MRN: [id number]  Unit/Bed#: -01 Encounter: [id number]  Admit date: 2022  Today's date: 22    Assessment/Plan   Ms Henna Singh is a 27y o  year-old  at [address] [facility] Day: 2, admitted for persistent headache  By issue:    Persistent headaches  Assessment & Plan  - MRI/MRV negative for acute intracranial pathology  Chiari I malformation present, but unlikely to be source of persistent headache  - Therapies tried to date: HA cocktail (IV Benadryl, IV Mag sulfate, IV Reglan), Sumatriptan sc, gabapentin 100 mg TID, IV caffeine   - Will add Flexeril 10 mg prn and discontinue gabapentin as patient no longer wants to trial  - Neurology following, appreciate continued recommendations  - Can add PO Magnesium, PRN Flexeril 10 mg TID   Consider PO steroid taper following completion of scheduled solumedrol 500 mg IV BID  - Given normal blood pressures and normal laboratory evaluations, less likely headache is preeclamptic in origin, however, will continue to monitor for signs/symptoms of developing preeclampsia    * Gestational hypertension, third trimester  Assessment & Plan  Normotensive during admission  No other symptoms - vision changes, RUQ/epigastric pain, chest pain, shortness of breath, lower extremity pain  F/u 24h urine protein, P/Cr 0 18 - unchanged from previous  Will need twice weekly NST when discharged    Systolic (44DWL), UQV:570 , Min:121 , JXL:258   Diastolic (30DES), SIY:71, Min:59, Max:70    Lab Results   Component Value Date    ALT 11 2022    AST 12 (L) 2022    ALKPHOS 71 2022             32 weeks gestation of pregnancy  Assessment & Plan  NST Daily  S/p Betamethasone  GBS pending  Normal 1h gtt  TDaP per patient request           Chief Complaint   Patient presents with    Headache       Physician Requesting Consult: Yomaira Velez MD  Reason for Consult / Principal Problem: Headache  Subspeciality: Perinatology     Marie Yee  is a 27y o  year-old  with an FREDY of Estimated Date of Delivery: 10/15/22 at [address] Day: 2, admitted for persistent headache  Patient was previously admitted last week with "the worst headache of my life"  She received IV headache cocktail with significant improvement in symptoms and she was discharged home, but re-presented with persistent symptoms  Patient has history of migraines that are typically left-sided in origin and temporal following trauma to the the same side years ago  Patient describes this headache as frontal and bandlike across her forehead  She denies vision changes, chest pain, shortness of breath, lower extremity pain or tenderness  She has some light sensitivity  Denies neurologic deficits, numbness and tingling in her hands, inability to grasp objects  She has not been able to sleep well  Her current pregnancy is significant for gestational hypertension  She has no obstetric complaints and feels regular fetal movement    Review of Systems   Constitutional: Positive for fatigue  Negative for chills and fever  HENT: Negative  Eyes: Positive for photophobia  Negative for visual disturbance  Respiratory: Negative for shortness of breath  Cardiovascular: Negative for chest pain  Gastrointestinal: Negative for abdominal pain, constipation, diarrhea, nausea and vomiting  Genitourinary: Negative for dysuria and hematuria  Neurological: Positive for headaches  Other history is as follows:    Historical Information   OB History    Para Term  AB Living   3       2     SAB IAB Ectopic Multiple Live Births   2              # Outcome Date GA Lbr Sadiq/2nd Weight Sex Delivery Anes PTL Lv   3 Current            2 2019           1 2017             Gynecologic history: Patient's last menstrual period was 2022       Past Medical History:   Diagnosis Date    Migraine      Past Surgical History:   Procedure Laterality Date    TONSILLECTOMY      WISDOM TOOTH EXTRACTION       Social History   Social History     Substance and Sexual Activity   Alcohol Use Not Currently     Social History     Substance and Sexual Activity   Drug Use Never     Social History     Tobacco Use   Smoking Status Never Smoker   Smokeless Tobacco Never Used     Family History: non-contributory    Meds/Allergies   Prior to Admission Medications   Prescriptions Last Dose Informant Patient Reported? Taking?    Prenatal Vit-Fe Fumarate-FA (PRENATAL VITAMIN PO) 8/22/2022 at Unknown time  Yes Yes   Sig: Take by mouth   acetaminophen (TYLENOL) 325 mg tablet 8/21/2022 at 2130  No Yes   Sig: Take 3 tablets (975 mg total) by mouth every 8 (eight) hours as needed for mild pain, headaches or fever   metoclopramide (Reglan) 10 mg tablet 8/21/2022 at 2130  No Yes   Sig: Take 1 tablet (10 mg total) by mouth every 6 (six) hours as needed (headache)      Facility-Administered Medications: None     Medication Administration - last 24 hours from 08/22/2022 1518 to 08/23/2022 1518       Date/Time Order Dose Route Action Action by     08/22/2022 2100 methylPREDNISolone sodium succinate (Solu-MEDROL) 500 mg in sodium chloride 0 9 % 250 mL IVPB 0 mg Intravenous Stopped Julia Camargo, RN     08/22/2022 2100 lactated ringers bolus 500 mL 0 mL Intravenous Stopped Julia MARTINEZ Case, RN     08/23/2022 1059 methylPREDNISolone sodium succinate (Solu-MEDROL) 500 mg in sodium chloride 0 9 % 250 mL IVPB 0 mg Intravenous Stopped Cammy Hilton RN     08/23/2022 1256 methylPREDNISolone sodium succinate (Solu-MEDROL) 500 mg in sodium chloride 0 9 % 250 mL IVPB 500 mg Intravenous [address] Cammy Hilton RN     08/23/2022 1000 gabapentin (NEURONTIN) capsule 100 mg 100 mg Oral Refused Cammy Hilton RN     08/23/2022 0153 gabapentin (NEURONTIN) capsule 100 mg 100 mg Oral Given Delicia Camargo, RN     08/22/2022 1754 gabapentin (NEURONTIN) capsule 100 mg 100 mg Oral Given Lundy Goodpasture, JW     08/23/2022 1131 acetaminophen (TYLENOL) tablet 975 mg 975 mg Oral Given Graciela Serrato RN     08/23/2022 0507 acetaminophen (TYLENOL) tablet 975 mg 975 mg Oral Given Kirill Camargo, JW     08/22/2022 2105 acetaminophen (TYLENOL) tablet 975 mg 975 mg Oral Given Kirill Camargo RN     08/23/2022 0738 ondansetron (ZOFRAN) injection 4 mg 4 mg Intravenous Given Graciela Serrato RN     08/23/2022 1345 caffeine-sodium benzoate 500 mg in sodium chloride 0 9 % 1,000 mL IVPB 0 mg Intravenous Stopped Graciela Serrato RN     08/23/2022 1245 caffeine-sodium benzoate 500 mg in sodium chloride 0 9 % 1,000 mL IVPB 500 mg Intravenous [address] Graciela Serrato RN     08/23/2022 1359 SUMAtriptan (IMITREX) subcutaneous injection 6 mg 6 mg Subcutaneous Given Graciela Serrato RN     08/23/2022 1255 SUMAtriptan (IMITREX) subcutaneous injection 6 mg 6 mg Subcutaneous Not Given Graciela Serrato RN        Current Facility-Administered Medications   Medication Dose Route Frequency    acetaminophen (TYLENOL) tablet 975 mg  975 mg Oral Q6H PRN    calcium carbonate (TUMS) chewable tablet 500 mg  500 mg Oral Daily PRN    cyclobenzaprine (FLEXERIL) tablet 10 mg  10 mg Oral TID PRN    magnesium oxide (MAG-OX) tablet 400 mg  400 mg Oral BID    methylPREDNISolone sodium succinate (Solu-MEDROL) 500 mg in sodium chloride 0 9 % 250 mL IVPB  500 mg Intravenous Q12H [facility]    ondansetron (ZOFRAN) injection 4 mg  4 mg Intravenous Q6H PRN    tetanus-diphtheria-acellular pertussis (BOOSTRIX) IM injection 0 5 mL  0 5 mL Intramuscular Once     Allergies   Allergen Reactions    Latex Hives and Rash    Adhesive [Medical Tape] Rash     Redness, rash, possible Latex allergy as well       Objective    Patient Vitals for the past 24 hrs:   BP Temp Temp src Pulse Resp SpO2   08/23/22 1205 121/70 98 °F (36 7 °C) Oral 67 20 97 %   08/23/22 0759 121/69 98 2 °F (36 8 °C) Oral 62 20 96 %   08/23/22 0500 128/64 98 1 °F (36 7 °C) Oral 82 18 99 %   08/23/22 0000 124/59 98 3 °F (36 8 °C) Oral 80 18 96 %   08/22/22 2006 135/67 98 °F (36 7 °C) Oral 80 18 96 %     Vitals: Blood pressure 121/70, pulse 67, temperature 98 °F (36 7 °C), temperature source Oral, resp  rate 20, last menstrual period 01/08/2022, SpO2 97 %  There is no height or weight on file to calculate BMI  Physical Exam  Constitutional:       General: She is not in acute distress  Appearance: Normal appearance  She is well-developed and normal weight  She is not toxic-appearing  HENT:      Head: Normocephalic and atraumatic  Eyes:      General: No scleral icterus  Conjunctiva/sclera: Conjunctivae normal    Cardiovascular:      Rate and Rhythm: Normal rate  Pulmonary:      Effort: Pulmonary effort is normal  No respiratory distress  Abdominal:      Palpations: Abdomen is soft  Tenderness: There is no abdominal tenderness  Skin:     General: Skin is warm and dry  Neurological:      General: No focal deficit present  Mental Status: She is alert and oriented to person, place, and time     Psychiatric:         Mood and Affect: Mood normal          Behavior: Behavior normal            Results from last 7 days   Lab Units 08/22/22 1132 08/20/22  1723 08/19/22  1304   WBC Thousand/uL 13 05* 17 29* 13 25*   HEMOGLOBIN g/dL 12 1 12 3 12 8   MCV fL 87 87 87   PLATELETS Thousands/uL 286 289 287         Results from last 7 days   Lab Units 08/22/22 1132 08/20/22  1723 08/19/22  1304   POTASSIUM mmol/L 3 9 3 8 4 1   CHLORIDE mmol/L 107 106 105   CO2 mmol/L 22 22 23   BUN mg/dL 8 7 5   CREATININE mg/dL 0 61 0 65 0 57*   EGFR ml/min/1 73sq m 127 124 130     Results from last 7 days   Lab Units 08/22/22  1132 08/20/22  1723 08/19/22  1304   AST U/L 12* 13 12*   ALT U/L 11 11 9   ALK PHOS U/L 71 77 84     Results from last 7 days   Lab Units 08/22/22 1132 08/20/22  1723 08/19/22  1304   PLATELETS Thousands/uL 286 289 287         Results from last 7 days   Lab Units 08/22/22 1132 08/20/22  1723 08/19/22  1304   PROT/CREAT RATIO UR  0 18* 0 18* 0 14*             Results from last 7 days   Lab Units 08/20/22  1723   URINE CULTURE  >100,000 cfu/ml        Fetal data:  Nonstress test: date 08/23/22 Time: 1849-0477  Baseline: 140 bpm  Variability: moderate  Accelerations: present, 15x15  Decelerations: absent  Contractions: present  Assessment: reactive  Plan: continue daily NST      Imaging, EKG, Pathology, and Other Studies: I have personally reviewed pertinent reports              La Nena Sadler MD  8/23/2022  3:18 PM

## 2022-08-23 NOTE — ASSESSMENT & PLAN NOTE
- MRI/MRV negative for acute intracranial pathology   Chiari I malformation present, but unlikely to be source of persistent headache  - Given normal blood pressures and normal laboratory evaluations, less likely headache is preeclamptic in origin, however, will continue to monitor for signs/symptoms of developing preeclampsia  - Therapies tried to date: HA cocktail (IV Benadryl, IV Mag sulfate, IV Reglan), Sumatriptan sc, gabapentin 100 mg TID, IV caffeine, Flexeril, Solumedrol 500 mg IV BID x 4 doses  - Neurology following, appreciate continued recommendations  - Most improvement in symptoms following addition of flexeril   - Continue PO Magnesium, Riboflavin 400 mg Flexeril 10 mg TID PRN  - Resolution of headache following administration of Cyproheptadine

## 2022-08-23 NOTE — PROGRESS NOTES
OBGYN Progress Note - Antepartum  Anahi Colindres 27 y o  female MRN: [id number]  Unit/Bed#: -01 Encounter: [id number]    Assessment/Plan:  27 y o   at [address] day HD#2 admitted with headache in the setting of known GHTN  By problem:    Persistent headaches  Assessment & Plan  Neurology consulted  Has received a heachae cocktail (2g Mag, 10mg IV Reglan, 25mg IV Benadryl, 500mg Solumedrol)  Patient feels her headache is worse this morning, now 6/10 as opposed to 4/10 on admission (frontal, bilateral though worse on right)  - Gabapentin 100 mg TID  - SoluMedrol 500 mg BID per Neurology  - MRI head, MRI brain completed, follow up final read  - Consult MFM    * Gestational hypertension, third trimester  Assessment & Plan  Systolic (27IGG), OTD:357 , Min:120 , FVQ:583     Diastolic (85PYN), UIM:37, Min:58, Max:70    CBC and CMP wnl  Urine P:C ratio: 0 18  Only symptom is headache (no chest pain, SOB, RUQ/epigastric pain, or increased swelling)  - Follow up 24 hr urine protein  - Monitor BPs        Subjective/Objective   Chief Complaint: headache    Subjective:   Contractions: no  Loss of fluid: no  Vaginal bleeding: no  Fetal movement: yes    Pain: no  Tolerating PO: yes  Voiding: yes  Ambulating: yes  Headaches: yes (6/10 from 10 yesterday, frontal, bilateral though worse on right)  Visual changes: no  Chest pain: no  Shortness of breath: no  Nausea: yes  Vomiting/Diarrhea: no  Dysuria: no  Leg pain: no    Objective:   Vitals:   Temp:  [98 °F (36 7 °C)-98 3 °F (36 8 °C)] 98 1 °F (36 7 °C)  HR:  [75-83] 82  Resp:  [18] 18  BP: (120-137)/(58-70) 128/64     Physical Exam  Constitutional:       General: She is not in acute distress  Appearance: Normal appearance  She is not ill-appearing  HENT:      Mouth/Throat:      Mouth: Mucous membranes are moist    Eyes:      Extraocular Movements: Extraocular movements intact     Cardiovascular:      Rate and Rhythm: Normal rate and regular rhythm  Heart sounds: Normal heart sounds  Pulmonary:      Effort: Pulmonary effort is normal       Breath sounds: Normal breath sounds  Abdominal:      General: There is no distension  Palpations: Abdomen is soft  Tenderness: There is no abdominal tenderness  There is no guarding  Musculoskeletal:         General: No swelling  Right lower leg: No edema  Left lower leg: No edema  Skin:     General: Skin is warm and dry  Coloration: Skin is not jaundiced or pale  Neurological:      General: No focal deficit present  Mental Status: She is alert  Psychiatric:         Mood and Affect: Mood normal          Fetal Assessment:  FHT: Baseline 135 bpm / Moderate 6 - 25 bpm / Accelerations present / Decelerations absent, NST reactive  [place]: no contractions    Lab, Imaging and other studies: I have personally reviewed pertinent reports        Lab Results   Component Value Date    WBC 13 05 (H) 08/22/2022    HGB 12 1 08/22/2022    HCT 35 4 08/22/2022    MCV 87 08/22/2022     08/22/2022       Lab Results   Component Value Date    CALCIUM 9 5 08/22/2022    K 3 9 08/22/2022    CO2 22 08/22/2022     08/22/2022    BUN 8 08/22/2022    CREATININE 0 61 08/22/2022       Lab Results   Component Value Date    ALT 11 08/22/2022    AST 12 (L) 08/22/2022    ALKPHOS 71 08/22/2022       Den Daly MD  OBGYN Resident  08/23/22  7:31 AM

## 2022-08-23 NOTE — UTILIZATION REVIEW
Initial Clinical Review    Admission: Date/Time/Statement:   Admission Orders (From admission, onward)     Ordered        08/22/22 1610  Place in Observation  Once                      Orders Placed This Encounter   Procedures    Place in Observation     Standing Status:   Standing     Number of Occurrences:   1     Order Specific Question:   Level of Care     Answer:   Med Surg [16]     ED Arrival Information     Patient not seen in ED                     Chief Complaint   Patient presents with    Headache       Initial Presentation: 27 y o  female presented to L&D as observation for gestation hypertension  [id number] at [place] [address] who is being admitted for intractable headache  She denies having uterine contractions, has no LOF, and reports no VB  She states she has felt good FM  She has a diagnosis of GHT  She was here on both Friday afternoon and Saturday night into Sunday with the same complaint  On Friday she received solumderol, reglan, mag and it improved and she was discharged home  Her headache came back within a few hours and so she presented again on Saturday  Received reglan, benadryl, solumedrol, mag again with no relief  By Sunday her headache was a 5 (from an 8 on Saturday) and she requested discharge home  She was discharged with reglan and tylenol which she has taken once without relief  It's still at a 5  On a home blood pressure cuff she reports a pressure of 146/85  Her pressures here today are 130s/70s   Plan IV steroids, NST BID     FHT:  Baseline Rate: 130 bpm  Variability: Moderate 6-25 bpm  Accelerations: 15 x 15 or greater, At variable times  Decelerations: None  FHR Category: Category I     TOCO:   Contraction Frequency (minutes): none      Admitting Vitals   Temperature Pulse Respirations Blood Pressure SpO2   08/22/22 1110 08/22/22 1125 08/22/22 1125 08/22/22 1125 08/22/22 2006   98 3 °F (36 8 °C) 75 18 130/70 96 %      Temp Source Heart Rate Source Patient Position - Orthostatic VS BP Location FiO2 (%)   08/22/22 1110 08/22/22 1125 08/22/22 1125 08/22/22 1125 --   Oral Monitor Lying Right arm       Pain Score       08/22/22 1110       5          Wt Readings from Last 1 Encounters:   08/22/22 104 kg (230 lb)     Additional Vital Signs:   Date/Time Temp Pulse Resp BP SpO2 O2 Device Cardiac (WDL) Patient Position - Orthostatic VS   08/23/22 0759 98 2 °F (36 8 °C) 62 20 121/69 96 % -- -- --   08/23/22 0500 98 1 °F (36 7 °C) 82 18 128/64 99 % None (Room air) -- Lying   08/23/22 0000 98 3 °F (36 8 °C) 80 18 124/59 96 % None (Room air) -- Lying   08/22/22 2006 98 °F (36 7 °C) 80 18 135/67 96 % -- WDL --   08/22/22 1240 -- 83 -- 120/66 -- -- -- --   08/22/22 1225 -- 80 -- 120/64 -- -- -- --   08/22/22 1211 -- 77 -- 128/68 -- -- -- --   08/22/22 1155 -- 81 -- 122/58 -- -- -- --   08/22/22 1140 -- 81 -- 137/68 -- -- -- --         Pertinent Labs/Diagnostic Test Results:   MRI brain wo contrast   Final Result by Ary Dominguez MD (08/23 0739)   1  No acute infarction, edema, or mass effect  2  Low lying cerebellar tonsils compatible with a Chiari I malformation  There is no evidence of associated hydrocephalus  MRV head wo contrast   Final Result by Ary Dominguez MD (08/23 0734)      No evidence of dural venous sinus thrombosis           Results from last 7 days   Lab Units 08/22/22  1132 08/20/22  1723 08/19/22  1304   WBC Thousand/uL 13 05* 17 29* 13 25*   HEMOGLOBIN g/dL 12 1 12 3 12 8   HEMATOCRIT % 35 4 36 5 38 8   PLATELETS Thousands/uL 286 289 287         Results from last 7 days   Lab Units 08/22/22  1132 08/20/22  1723 08/19/22  1304   SODIUM mmol/L 139 137 134*   POTASSIUM mmol/L 3 9 3 8 4 1   CHLORIDE mmol/L 107 106 105   CO2 mmol/L 22 22 23   ANION GAP mmol/L 10 9 6   BUN mg/dL 8 7 5   CREATININE mg/dL 0 61 0 65 0 57*   EGFR ml/min/1 73sq m 127 124 130   CALCIUM mg/dL 9 5 9 8 9 6     Results from last 7 days   Lab Units 08/22/22  1132 08/20/22  1723 08/19/22  1304   AST U/L 12* 13 12* ALT U/L 11 11 9   ALK PHOS U/L 71 77 84   TOTAL PROTEIN g/dL 6 6 6 7 6 8   ALBUMIN g/dL 3 6 3 7 3 6   TOTAL BILIRUBIN mg/dL 0 27 0 32 0 31         Results from last 7 days   Lab Units 08/22/22  1132 08/20/22  1723 08/19/22  1304   GLUCOSE RANDOM mg/dL 104 95 77       Results from last 7 days   Lab Units 08/22/22  1132 08/20/22  1723 08/19/22  1304   CLARITY UA   --  Turbid  --    COLOR UA   --  Yellow  --    SPEC GRAV UA   --  1 017  --    PH UA   --  5 5  --    GLUCOSE UA mg/dl  --  Negative  --    KETONES UA mg/dl  --  20 (1+)*  --    BLOOD UA   --  Negative  --    PROTEIN UA mg/dl  --  Trace*  --    NITRITE UA   --  Negative  --    BILIRUBIN UA   --  Negative  --    UROBILINOGEN UA (BE) mg/dl  --  <2 0  --    LEUKOCYTES UA   --  Small*  --    WBC UA /hpf  --  20-30*  --    RBC UA /hpf  --  1-2  --    BACTERIA UA /hpf  --  Occasional  --    EPITHELIAL CELLS WET PREP /hpf  --  Moderate*  --    MUCUS THREADS   --  Occasional*  --    CREATININE UR mg/dL 210 2 153 9 49 6   PROTEIN UR mg/dL 37 27 7   PROT/CREAT RATIO UR  0 18* 0 18* 0 14*                                 Results from last 7 days   Lab Units 08/20/22  1723   URINE CULTURE  >100,000 cfu/ml                ED Treatment:   Medication Administration - No Administrations Displayed (No Start Event Found)     None        Past Medical History:   Diagnosis Date    Migraine      Present on Admission:   Gestational hypertension, third trimester   Persistent headaches      Admitting Diagnosis: No admission diagnoses are documented for this encounter    Age/Sex: 27 y o  female  Admission Orders:  Scheduled Medications:  gabapentin, 100 mg, Oral, TID  methylPREDNISolone sodium succinate, 500 mg, Intravenous, Q12H [address]      Continuous IV Infusions:     PRN Meds:  acetaminophen, 975 mg, Oral, Q6H PRN  calcium carbonate, 500 mg, Oral, Daily PRN  ondansetron, 4 mg, Intravenous, Q6H PRN        IP CONSULT TO NEUROLOGY  IP CONSULT TO PERINATOLOGY    Network Utilization Review Department  ATTENTION: Please call with any questions or concerns to [phone] and carefully listen to the prompts so that you are directed to the right person  All voicemails are confidential   Donna Grand all requests for admission clinical reviews, approved or denied determinations and any other requests to dedicated fax number below belonging to the campus where the patient is receiving treatment   List of dedicated fax numbers for the Facilities:  [address] DENIALS (Administrative/Medical Necessity) [phone]   [address] (Maternity/NICU/Pediatrics) [phone]   [address]  [address] [facility] [place] [address] [address] [place] [address] [address] [facility] [place] [address] [phone]

## 2022-08-23 NOTE — PLAN OF CARE
Problem: ANTEPARTUM  Goal: Maintain pregnancy as long as maternal and/or fetal condition is stable  Description: INTERVENTIONS:  - Maternal surveillance  - Fetal surveillance  - Monitor uterine activity  - Medications as ordered  - Bedrest  Outcome: Progressing     Problem: PAIN - ADULT  Goal: Verbalizes/displays adequate comfort level or baseline comfort level  Description: Interventions:  - Encourage patient to monitor pain and request assistance  - Assess pain using appropriate pain scale  - Administer analgesics based on type and severity of pain and evaluate response  - Implement non-pharmacological measures as appropriate and evaluate response  - Consider cultural and social influences on pain and pain management  - Notify physician/advanced practitioner if interventions unsuccessful or patient reports new pain  Outcome: Progressing     Problem: INFECTION - ADULT  Goal: Absence or prevention of progression during hospitalization  Description: INTERVENTIONS:  - Assess and monitor for signs and symptoms of infection  - Monitor lab/diagnostic results  - Monitor all insertion sites, i e  indwelling lines, tubes, and drains  - Monitor endotracheal if appropriate and nasal secretions for changes in amount and color  - Eugene appropriate cooling/warming therapies per order  - Administer medications as ordered  - Instruct and encourage patient and family to use good hand hygiene technique  - Identify and instruct in appropriate isolation precautions for identified infection/condition  Outcome: Progressing  Goal: Absence of fever/infection during neutropenic period  Description: INTERVENTIONS:  - Monitor WBC    Outcome: Progressing     Problem: SAFETY ADULT  Goal: Patient will remain free of falls  Description: INTERVENTIONS:  - Educate patient/family on patient safety including physical limitations  - Instruct patient to call for assistance with activity   - Consult OT/PT to assist with strengthening/mobility   - Keep Call bell within reach  - Keep bed low and locked with side rails adjusted as appropriate  - Keep care items and personal belongings within reach  - Initiate and maintain comfort rounds  - Obtain necessary fall risk management equipment  - Apply yellow socks and bracelet for high fall risk patients  - Consider moving patient to room near nurses station  Outcome: Progressing  Goal: Maintain or return to baseline ADL function  Description: INTERVENTIONS:  -  Assess patient's ability to carry out ADLs; assess patient's baseline for ADL function and identify physical deficits which impact ability to perform ADLs (bathing, care of mouth/teeth, toileting, grooming, dressing, etc )  - Assess/evaluate cause of self-care deficits   - Assess range of motion  - Assess patient's mobility; develop plan if impaired  - Assess patient's need for assistive devices and provide as appropriate  - Encourage maximum independence but intervene and supervise when necessary  - Involve family in performance of ADLs  - Assess for home care needs following discharge   - Consider OT consult to assist with ADL evaluation and planning for discharge  - Provide patient education as appropriate  Outcome: Progressing  Goal: Maintains/Returns to pre admission functional level  Description: INTERVENTIONS:  - Perform BMAT or MOVE assessment daily    - Set and communicate daily mobility goal to care team and patient/family/caregiver     - Collaborate with rehabilitation services on mobility goals if consulted  - Stand patient   - Ambulate patient   - Out of bed to chair   - Out of bed for meals   - Out of bed for toileting  - Record patient progress and toleration of activity level   Outcome: Progressing     Problem: Knowledge Deficit  Goal: Patient/family/caregiver demonstrates understanding of disease process, treatment plan, medications, and discharge instructions  Description: Complete learning assessment and assess knowledge base   Interventions:  - Provide teaching at level of understanding  - Provide teaching via preferred learning methods  Outcome: Progressing     Problem: DISCHARGE PLANNING  Goal: Discharge to home or other facility with appropriate resources  Description: INTERVENTIONS:  - Identify barriers to discharge w/patient and caregiver  - Arrange for needed discharge resources and transportation as appropriate  - Identify discharge learning needs (meds, wound care, etc )  - Arrange for interpretive services to assist at discharge as needed  - Refer to Case Management Department for coordinating discharge planning if the patient needs post-hospital services based on physician/advanced practitioner order or complex needs related to functional status, cognitive ability, or social support system  Outcome: Progressing

## 2022-08-23 NOTE — PROGRESS NOTES
Progress Note - Neurology   Marie Yee 27 y o  female MRN: [id number]  Unit/Bed#: -01 Encounter: [id number]      Assessment/Plan   Persistent headaches  Assessment & Plan  Marie Yee is a 27 y o   female at [place] [address] with migraines and gestational hypertension who presents to [facility] on 2022 with persistent headache  Intractable headache in a  patient with a history of migraines  Suspicion for intractable migraine with cervicogenic component  MRV head unremarkable for dural venous sinus thrombosis  MRI brain unremarkable for acute infarct, however did demonstrate low lying cerebellar tonsils concerning for Chiari I malformation without evidence of hydrocephalus  Unlikely Chiari I malformation is attributing to headache  Plan:   - S/p IV Benadryl, IV Mag sulfate, IV Reglan   - Headache regimen  · Defer continuation of magnesium sulfate to OBGYN  · Recommend Solumedrol 500 BID starting 08/23 x 2 doses   · No improvement with Gabapentin 100 TID, okay to discontinue   · Will trial sumatriptan subcu 6 mg x 1  · Will also trial caffeine sodium benzoate 500 mg x1   · Continue Acetaminophen PRN  · Aqua K  - Would not recommend frequent use of Fioricet   - BP management per primary team, goal normotension   - Medical management and supportive care per primary team, notify with changes  - Follow up with outpatient neurology headache team in 4-6 weeks    Marie Yee will need follow up in in 4 weeks with headache attending or advance practitioner  She will not require outpatient neurological testing  Subjective:   Patient states she continues to have a headache today, worse than yesterday, rated 6/10 pain  Patient states the headache is now located in the right temporal region  Denies weakness, numbness, dizziness, double vision, loss of vision, blurry vision, speech dysfunction, nausea, vomiting       ROS:  12 point ROS performed, as stated above, all others negative  Vitals: Blood pressure 121/69, pulse 62, temperature 98 2 °F (36 8 °C), temperature source Oral, resp  rate 20, last menstrual period 01/08/2022, SpO2 96 %  ,There is no height or weight on file to calculate BMI  Physical Exam  Vitals and nursing note reviewed  Constitutional:       General: She is not in acute distress  Appearance: Normal appearance  She is not ill-appearing, toxic-appearing or diaphoretic  HENT:      Head: Normocephalic and atraumatic  Eyes:      General: No scleral icterus  Right eye: No discharge  Left eye: No discharge  Extraocular Movements: Extraocular movements intact and EOM normal       Conjunctiva/sclera: Conjunctivae normal       Pupils: Pupils are equal, round, and reactive to light  Musculoskeletal:      Cervical back: Normal range of motion and neck supple  Skin:     General: Skin is warm and dry  Coloration: Skin is not jaundiced or pale  Findings: No bruising, erythema, lesion or rash  Neurological:      Mental Status: She is alert  Deep Tendon Reflexes: Strength normal    Psychiatric:         Mood and Affect: Mood normal          Behavior: Behavior normal          Thought Content: Thought content normal          Judgment: Judgment normal        Neurologic Exam     Mental Status   Patient is alert, sitting up in bed  Oriented x 3  No dysarthria or aphasia noted  Able to follow central and appendicular commands, and answers all questions appropriately  Cranial Nerves     CN II   Visual fields full to confrontation  CN III, IV, VI   Pupils are equal, round, and reactive to light  Extraocular motions are normal    Nystagmus: none   Upgaze: normal  Downgaze: normal  Conjugate gaze: present    CN V   Facial sensation intact  CN VII   Facial expression full, symmetric       CN VIII   Hearing: intact    CN IX, X   Palate: symmetric    CN XI   CN XI normal      CN XII   Tongue deviation: none    Motor Exam Muscle bulk: normal  Overall muscle tone: normal  Right arm pronator drift: absent  Left arm pronator drift: absent    Strength   Strength 5/5 throughout  Sensory Exam   Light touch normal      Gait, Coordination, and Reflexes     Tremor   Resting tremor: absent  No ataxia or dysmetria noted in BUE finger to nose testing  No involuntary movements or seizure like activity noted throughout exam      Lab Results: I have personally reviewed pertinent reports  No results found for this or any previous visit (from the past 24 hour(s))  ]  Imaging Studies: I have personally reviewed pertinent reports and I have personally reviewed pertinent films in PACS  EKG, Pathology, and Other Studies: I have personally reviewed pertinent reports  Counseling / Coordination of Care  Total time spent today 28 minutes  Greater than 50% of total time was spent with the patient and/or family counseling and/or coordination of care  A description of the counseling/coordination of care:  Patient was seen and evaluated  Discussed with attending  Chart reviewed thoroughly including laboratory and imaging studies  Plan of care discussed with patient and primary team  Discussed MRI brain and MRV head results with the patient  Dictation voice to text software has been used in the creation of this document  Please consider this in light of any contextual or grammatical errors

## 2022-08-24 DIAGNOSIS — Z86.69 HX OF MIGRAINES: ICD-10-CM

## 2022-08-24 DIAGNOSIS — Z3A.32 32 WEEKS GESTATION OF PREGNANCY: ICD-10-CM

## 2022-08-24 DIAGNOSIS — R51.9 PERSISTENT HEADACHES: Primary | ICD-10-CM

## 2022-08-24 LAB
ABO GROUP BLD: NORMAL
ABO GROUP BLD: NORMAL
ALBUMIN SERPL BCP-MCNC: 3.3 G/DL (ref 3.5–5)
ALP SERPL-CCNC: 60 U/L (ref 34–104)
ALT SERPL W P-5'-P-CCNC: 10 U/L (ref 7–52)
ANION GAP SERPL CALCULATED.3IONS-SCNC: 8 MMOL/L (ref 4–13)
AST SERPL W P-5'-P-CCNC: 9 U/L (ref 13–39)
BILIRUB SERPL-MCNC: 0.21 MG/DL (ref 0.2–1)
BLD GP AB SCN SERPL QL: NEGATIVE
BUN SERPL-MCNC: 8 MG/DL (ref 5–25)
CALCIUM ALBUM COR SERPL-MCNC: 10 MG/DL (ref 8.3–10.1)
CALCIUM SERPL-MCNC: 9.4 MG/DL (ref 8.4–10.2)
CHLORIDE SERPL-SCNC: 107 MMOL/L (ref 96–108)
CO2 SERPL-SCNC: 21 MMOL/L (ref 21–32)
CREAT SERPL-MCNC: 0.53 MG/DL (ref 0.6–1.3)
ERYTHROCYTE [DISTWIDTH] IN BLOOD BY AUTOMATED COUNT: 12.9 % (ref 11.6–15.1)
GFR SERPL CREATININE-BSD FRML MDRD: 133 ML/MIN/1.73SQ M
GLUCOSE SERPL-MCNC: 130 MG/DL (ref 65–140)
HCT VFR BLD AUTO: 35.3 % (ref 34.8–46.1)
HGB BLD-MCNC: 11.6 G/DL (ref 11.5–15.4)
MCH RBC QN AUTO: 29.1 PG (ref 26.8–34.3)
MCHC RBC AUTO-ENTMCNC: 32.9 G/DL (ref 31.4–37.4)
MCV RBC AUTO: 89 FL (ref 82–98)
PLATELET # BLD AUTO: 293 THOUSANDS/UL (ref 149–390)
PMV BLD AUTO: 10.5 FL (ref 8.9–12.7)
POTASSIUM SERPL-SCNC: 3.9 MMOL/L (ref 3.5–5.3)
PROT 24H UR-MCNC: 266 MG/24 HRS (ref 40–150)
PROT SERPL-MCNC: 6 G/DL (ref 6.4–8.4)
RBC # BLD AUTO: 3.99 MILLION/UL (ref 3.81–5.12)
RH BLD: POSITIVE
RH BLD: POSITIVE
SODIUM SERPL-SCNC: 136 MMOL/L (ref 135–147)
SPECIMEN EXPIRATION DATE: NORMAL
SPECIMEN VOL UR: 3800 ML
WBC # BLD AUTO: 14.04 THOUSAND/UL (ref 4.31–10.16)

## 2022-08-24 PROCEDURE — 86850 RBC ANTIBODY SCREEN: CPT | Performed by: OBSTETRICS & GYNECOLOGY

## 2022-08-24 PROCEDURE — 80053 COMPREHEN METABOLIC PANEL: CPT | Performed by: OBSTETRICS & GYNECOLOGY

## 2022-08-24 PROCEDURE — 59025 FETAL NON-STRESS TEST: CPT | Performed by: OBSTETRICS & GYNECOLOGY

## 2022-08-24 PROCEDURE — 86900 BLOOD TYPING SEROLOGIC ABO: CPT | Performed by: OBSTETRICS & GYNECOLOGY

## 2022-08-24 PROCEDURE — 99232 SBSQ HOSP IP/OBS MODERATE 35: CPT | Performed by: OBSTETRICS & GYNECOLOGY

## 2022-08-24 PROCEDURE — 84156 ASSAY OF PROTEIN URINE: CPT | Performed by: OBSTETRICS & GYNECOLOGY

## 2022-08-24 PROCEDURE — 86901 BLOOD TYPING SEROLOGIC RH(D): CPT | Performed by: OBSTETRICS & GYNECOLOGY

## 2022-08-24 PROCEDURE — 85027 COMPLETE CBC AUTOMATED: CPT | Performed by: OBSTETRICS & GYNECOLOGY

## 2022-08-24 PROCEDURE — 99232 SBSQ HOSP IP/OBS MODERATE 35: CPT | Performed by: PSYCHIATRY & NEUROLOGY

## 2022-08-24 RX ORDER — CYPROHEPTADINE HYDROCHLORIDE 4 MG/1
4 TABLET ORAL
Status: DISCONTINUED | OUTPATIENT
Start: 2022-08-24 | End: 2022-08-25

## 2022-08-24 RX ADMIN — MAGNESIUM OXIDE TAB 400 MG (241.3 MG ELEMENTAL MG) 400 MG: 400 (241.3 MG) TAB at 09:07

## 2022-08-24 RX ADMIN — CYCLOBENZAPRINE HYDROCHLORIDE 10 MG: 10 TABLET, FILM COATED ORAL at 13:18

## 2022-08-24 RX ADMIN — CYCLOBENZAPRINE HYDROCHLORIDE 10 MG: 10 TABLET, FILM COATED ORAL at 07:05

## 2022-08-24 RX ADMIN — MAGNESIUM OXIDE TAB 400 MG (241.3 MG ELEMENTAL MG) 400 MG: 400 (241.3 MG) TAB at 18:24

## 2022-08-24 RX ADMIN — CYPROHEPTADINE HYDROCHLORIDE 4 MG: 4 TABLET ORAL at 20:04

## 2022-08-24 RX ADMIN — ACETAMINOPHEN 975 MG: 325 TABLET ORAL at 19:20

## 2022-08-24 RX ADMIN — ACETAMINOPHEN 975 MG: 325 TABLET ORAL at 07:05

## 2022-08-24 RX ADMIN — SODIUM CHLORIDE 500 MG: 0.9 INJECTION, SOLUTION INTRAVENOUS at 13:18

## 2022-08-24 NOTE — PROGRESS NOTES
I sent a prescription for riboflavin to her pharmacy as we cannot prescribe it as an inpatient since it is not on formulary    She will have a family member pick it up and bring into the hospital     Ronny Perry MD

## 2022-08-24 NOTE — PROGRESS NOTES
NEUROLOGY RESIDENCY PROGRESS NOTE     Name: Kimberly Sanchez   Age & Sex: 27 y o  female   MRN: [id number]  Unit/Bed#: -01   Encounter: [id number]    ASSESSMENT & PLAN     Persistent headaches  Assessment & Plan  Kimberly Sanchez is a 27 y o   female at 35w3d with migraines and gestational hypertension who presents to [facility] on 2022 with persistent headache that is most consistent with atypical migraine vs tension headache, possibly in setting of hormonal changes and fluid shifts of pregnancy  No CVST, no clear explicatory findings on MRI (do not believe Chiari I contributory w/ no evidence of hydrocephalus)  Low suspicion for preeclamptic origin  Plan:    S/p IV Benadryl, IV Mag sulfate, IV Reglan    Headache regimen  · Defer continuation of magnesium sulfate to OBGYN  · Recommend Solumedrol 500 BID starting 08/23 x 2 doses   · No improvement with Gabapentin 100 TID, okay to discontinue   · Trialed sumatriptan subcu 6 mg x 1 and caffeine sodium benzoate 500 mg x1 w/ some improvement  · Continue Acetaminophen PRN  · Aqua K  · Could consider addition of prophylactic medication such as cyproheptadine - patient seems amenable to this; per discussion w/ Dr Adam Oakley, would avoid BB/CCB which may mask HTN  · At this point would hold off on adding a preventive medication unless HA returns/worsens   Would not recommend frequent use of Fioricet    BP management per primary team, goal normotension    Medical management and supportive care per primary team, notify with changes   Follow up with outpatient neurology headache team in 4-6 weeks    Kimberly Sanchez will need follow up in 4-6 weeks with headache or general clinician (resident, AP, attending)  She will not require outpatient neurological testing  Pending for d/c: clinical improvement    SUBJECTIVE     Patient was seen and examined  No critical events overnight  HA improving to about 4/10       ROS negative unless otherwise noted    OBJECTIVE     Patient ID: Roel Washington is a 27 y o  female  Vitals:    22 1610 22 2044 22 2350 22 0518   BP: 138/67 131/62 113/53 124/62   BP Location:       Pulse: 62 76 71 72   Resp: 20 16 16 16   Temp: 98 1 °F (36 7 °C) 98 1 °F (36 7 °C) 97 8 °F (36 6 °C) 97 8 °F (36 6 °C)   TempSrc: Oral Oral Oral Oral   SpO2: 97% 95%          Temperature: Temp (24hrs), Av °F (36 7 °C), Min:97 8 °F (36 6 °C), Max:98 2 °F (36 8 °C)   Temperature: 97 8 °F (36 6 °C)    Physical Exam Vitals reviewed  Constitutional:    Not in acute distress  Normal appearance  Not ill-appearing, toxic-appearing or diaphoretic  HENT:   Normocephalic and atraumatic  External ear normal b/l  Nose normal  No congestion or rhinorrhea  Mucous membranes are moist   Oropharynx is clear  No oropharyngeal exudate or posterior oropharyngeal erythema  Eyes:    No scleral icterus  No discharge b/l  Conjunctivae normal    Pulmonary:  Pulmonary effort is normal  No respiratory distress  GI: abdomen not distended  Musculoskeletal: no gross deformities  Skin:   Skin is not pale  Psychiatric:       Mood normal  Affect congruent    Neurologic Exam   Mental Status   Alert and oriented to person, place, and time  Attention is normal  Speech is fluent w/o dysarthria     Cranial Nerves   CN II Visual fields full to confrontation  CN III, IV, VI PERRL, brisk reactivity, intact consensual response b/l, EOMI, no CN III palsy, no CN VI palsy  no nystagmus   CN V   Facial sensation symmetric  CN VII  Facial expression full, symmetric  CN VIII Hearing roughly symmetric  CN IX, X Palate symmetric  CN XI trapezius strength symmetric  CN XII no dysarthria, symmetric lingual protrusion       Motor Exam   Muscle bulk and tone grossly normal; Pronator drift absent b/l  Strength intact and symmetric BUE/BLE     Sensory Exam   Light touch, vibration and Temperature intact and symmetric BUE/BLE    Gait, Coordination, and Reflexes   FTN normal b/l; no significant tremor observed  Reflexes: Brachioradialis: 2+ b/l    Biceps: 2+ b/l    Patellar: 2+ b/l     Plantar response downgoing b/l   No clonus    LABORATORY DATA     Labs: I have personally reviewed pertinent reports  Results from last 7 days   Lab Units 08/24/22  0505 08/22/22  1132 08/20/22  1723   WBC Thousand/uL 14 04* 13 05* 17 29*   HEMOGLOBIN g/dL 11 6 12 1 12 3   HEMATOCRIT % 35 3 35 4 36 5   PLATELETS Thousands/uL 293 286 289      Results from last 7 days   Lab Units 08/24/22  0505 08/22/22  1132 08/20/22  1723   POTASSIUM mmol/L 3 9 3 9 3 8   CHLORIDE mmol/L 107 107 106   CO2 mmol/L 21 22 22   BUN mg/dL 8 8 7   CREATININE mg/dL 0 53* 0 61 0 65   CALCIUM mg/dL 9 4 9 5 9 8   ALK PHOS U/L 60 71 77   ALT U/L 10 11 11   AST U/L 9* 12* 13                  Lab Results   Component Value Date    HGBA1C 5 2 07/11/2018     IMAGING & DIAGNOSTIC TESTING     Radiology Results: I have personally reviewed pertinent reports  and I have personally reviewed pertinent films in PACS  MRI brain wo contrast   Final Result by Barb Valderrama MD (08/23 0739)   1  No acute infarction, edema, or mass effect  2  Low lying cerebellar tonsils compatible with a Chiari I malformation  There is no evidence of associated hydrocephalus  The study was marked in [place] [facility] [facility] for immediate notification  Workstation performed: [id number]         MRV head wo contrast   Final Result by Barb Valderrama MD (08/23 0734)      No evidence of dural venous sinus thrombosis  Workstation performed: [id number]           Other Diagnostic Testing: I have personally reviewed pertinent reports        ACTIVE MEDICATIONS     Current Facility-Administered Medications   Medication Dose Route Frequency    acetaminophen (TYLENOL) tablet 975 mg  975 mg Oral Q6H PRN    calcium carbonate (TUMS) chewable tablet 500 mg  500 mg Oral Daily PRN    cyclobenzaprine (FLEXERIL) tablet 10 mg  10 mg Oral TID PRN    magnesium oxide (MAG-OX) tablet 400 mg  400 mg Oral BID    ondansetron (ZOFRAN) injection 4 mg  4 mg Intravenous Q6H PRN    tetanus-diphtheria-acellular pertussis (BOOSTRIX) IM injection 0 5 mL  0 5 mL Intramuscular Once     VTE Pharmacologic Prophylaxis: patient independently mobile and ambulatory  VTE Mechanical Prophylaxis: sequential compression device    ==  Galo Contreras MD  Resident, Neurology, [address] [place] [address]

## 2022-08-24 NOTE — PROGRESS NOTES
Progress Note - Maternal Fetal Medicine   Kimberly Sanchez 27 y o  female MRN: [id number]  Unit/Bed#: -01 Encounter: [id number]  Admit date: 2022  Today's date: 22    Assessment/Plan     Ms Jeovanny Anaya is a 27 y o   at [facility] 13 Day: 3, admitted with headache in the setting of gestational hypertension, improved  By issue:    Persistent headaches  Assessment & Plan  - MRI/MRV negative for acute intracranial pathology  Chiari I malformation present, but unlikely to be source of persistent headache  - Therapies tried to date: HA cocktail (IV Benadryl, IV Mag sulfate, IV Reglan), Sumatriptan sc, gabapentin 100 mg TID, IV caffeine  - Will add Flexeril 10 mg prn and discontinue gabapentin as patient no longer wants to trial  - Neurology following, appreciate continued recommendations  - PO Magnesium and PRN Flexeril 10 mg TID PRN added  Patient notes improvement in headache this AM to 3/10   Plan to give one additional solumedrol 500 mg IV this AM then consider transition to Medrol dosepak taper   - Given normal blood pressures and normal laboratory evaluations, less likely headache is preeclamptic in origin, however, will continue to monitor for signs/symptoms of developing preeclampsia    * Gestational hypertension, third trimester  Assessment & Plan  Normotensive during admission  No other symptoms - vision changes, RUQ/epigastric pain, chest pain, shortness of breath, lower extremity pain  F/u 24h urine protein, P/Cr 0 18 - unchanged from previous  Will need twice weekly NST when discharged    Systolic (72HVB), HSX:675 , Min:113 , YL   Diastolic (77JST), TVV:60, Min:53, Max:70    Lab Results   Component Value Date    ALT 10 2022    AST 9 (L) 2022    ALKPHOS 60 2022             32 weeks gestation of pregnancy  Assessment & Plan  NST Daily  S/p Betamethasone  GBS pending  Normal 1h gtt  TDaP per patient request             Subjective    Contractions: no  Loss of fluid: no  Vaginal bleeding: no  Fetal movement: yes    Pain: no  Headaches: improved to 3/10, patient reports feeling the most improved since initial headache onset  Visual changes: no  Chest pain: no  Shortness of breath: no  Nausea: no  Vomiting/Diarrhea: no  Dysuria: no  Leg pain: no          Objective      Patient Vitals for the past 24 hrs:   BP Temp Temp src Pulse Resp SpO2   08/24/22 0518 124/62 97 8 °F (36 6 °C) Oral 72 16 --   08/23/22 2350 113/53 97 8 °F (36 6 °C) Oral 71 16 --   08/23/22 2044 131/62 98 1 °F (36 7 °C) Oral 76 16 95 %   08/23/22 1610 138/67 98 1 °F (36 7 °C) Oral 62 20 97 %   08/23/22 1205 121/70 98 °F (36 7 °C) Oral 67 20 97 %   08/23/22 0759 121/69 98 2 °F (36 8 °C) Oral 62 20 96 %       Physical Exam  Constitutional:       General: She is not in acute distress  Appearance: Normal appearance  She is well-developed and normal weight  She is not ill-appearing, toxic-appearing or diaphoretic  HENT:      Head: Normocephalic and atraumatic  Eyes:      General: No scleral icterus  Conjunctiva/sclera: Conjunctivae normal    Cardiovascular:      Rate and Rhythm: Normal rate  Pulmonary:      Effort: Pulmonary effort is normal  No respiratory distress  Abdominal:      Palpations: Abdomen is soft  Tenderness: There is no abdominal tenderness  Skin:     General: Skin is warm and dry  Neurological:      General: No focal deficit present  Mental Status: She is alert and oriented to person, place, and time     Psychiatric:         Mood and Affect: Mood normal          Behavior: Behavior normal          I/O       08/22 0701  08/23 0700 08/23 0701  08/24 0700 08/24 0701 08/25 0700    Urine  1200     Total Output  1200     Net  -1200                  Invasive Devices  Timeline    Peripheral Intravenous Line  Duration           Peripheral IV 08/22/22 Left Antecubital 1 day                Results from last 7 days   Lab Units 08/24/22  0505 08/22/22  1132 08/20/22  1723 08/19/22  1304   WBC Thousand/uL 14 04* 13 05* 17 29* 13 25*   HEMOGLOBIN g/dL 11 6 12 1 12 3 12 8   MCV fL 89 87 87 87   PLATELETS Thousands/uL 293 286 289 287     Results from last 7 days   Lab Units 08/24/22  0505 08/22/22  1132 08/20/22  1723 08/19/22  1304   POTASSIUM mmol/L 3 9 3 9 3 8 4 1   CHLORIDE mmol/L 107 107 106 105   CO2 mmol/L 21 22 22 23   BUN mg/dL 8 8 7 5   CREATININE mg/dL 0 53* 0 61 0 65 0 57*   EGFR ml/min/1 73sq m 133 127 124 130     Results from last 7 days   Lab Units 08/24/22  0505 08/22/22  1132 08/20/22  1723 08/19/22  1304   AST U/L 9* 12* 13 12*   ALT U/L 10 11 11 9     Results from last 7 days   Lab Units 08/24/22  0505 08/22/22  1132 08/20/22  1723 08/19/22  1304   PLATELETS Thousands/uL 293 286 289 287         Results from last 7 days   Lab Units 08/22/22  1132 08/20/22  1723 08/19/22  1304   PROT/CREAT RATIO UR  0 18* 0 18* 0 14*             Results from last 7 days   Lab Units 08/20/22  1723   URINE CULTURE  >100,000 cfu/ml        Fetal data:  Nonstress test: date 08/24/22 Time: 0600  Baseline: 125 bpm  Variability: moderate  Accelerations: present, 15x15  Decelerations: absent  Contractions: absent  Assessment: reactive  Plan: daily       Current Facility-Administered Medications   Medication Dose Route Frequency    acetaminophen (TYLENOL) tablet 975 mg  975 mg Oral Q6H PRN    calcium carbonate (TUMS) chewable tablet 500 mg  500 mg Oral Daily PRN    cyclobenzaprine (FLEXERIL) tablet 10 mg  10 mg Oral TID PRN    magnesium oxide (MAG-OX) tablet 400 mg  400 mg Oral BID    ondansetron (ZOFRAN) injection 4 mg  4 mg Intravenous Q6H PRN    tetanus-diphtheria-acellular pertussis (BOOSTRIX) IM injection 0 5 mL  0 5 mL Intramuscular Once            Eufemia Session, MD RAPP, PGY-3  8/24/2022  7:18 AM

## 2022-08-24 NOTE — PHYSICIAN ADVISOR
Current patient class: Observation  The patient is currently on Hospital Day: 3 at [address]      This patient was originally admitted to the hospital under observation class  After admission the patient was reevaluated and determined to require further hospitalization  After review of the relevant documentation, labs, vital signs and test results, the patient is appropriate for INPATIENT ADMISSION  Admission to the hospital as an inpatient is a complex decision making process which requires the practitioner to consider the patients presenting complaint, history and physical examination and all relevant testing  With this in mind, in this case, the patient was deemed appropriate for INPATIENT ADMISSION  After review of the documentation and testing available at the time of the admission I concur with this clinical determination of medical necessity  Rationale is as follows: The patient has been hospitalized for two days under observation class  27 y o  [id number] with an FREDY of 10/15/2022, by Last Menstrual Period at [place] [address] who is being admitted for intractable headache  She is expected to remain in the hospital beyond today for continued management  She had a recent observation stay as well  She has received several medications to include methylprednisolone 500 mg intravenous every 12 hours, intravenous magnesium sulfate, and IV caffeine  MFM and Neurology are involved in her care  At this point she is appropriate for change to an inpatient admission      The patients vitals on arrival were   ED Triage Vitals   Temperature Pulse Respirations Blood Pressure SpO2   08/22/22 1110 08/22/22 1125 08/22/22 1125 08/22/22 1125 08/22/22 2006   98 3 °F (36 8 °C) 75 18 130/70 96 %      Temp Source Heart Rate Source Patient Position - Orthostatic VS BP Location FiO2 (%)   08/22/22 1110 08/22/22 1125 08/22/22 1125 08/22/22 1125 --   Oral Monitor Lying Right arm       Pain Score 08/22/22 1110       5           Past Medical History:   Diagnosis Date    Migraine      Past Surgical History:   Procedure Laterality Date    TONSILLECTOMY      WISDOM TOOTH EXTRACTION         The patient was admitted to the hospital at N/A on N/A for the following diagnosis:  No admission diagnoses are documented for this encounter      Consults have been placed to:   IP CONSULT TO NEUROLOGY  IP CONSULT TO PERINATOLOGY    Vitals:    08/23/22 2350 08/24/22 0518 08/24/22 0818 08/24/22 1158   BP: 113/53 124/62 127/68 120/56   BP Location:       Pulse: 71 72 77 75   Resp: 16 16 20 20   Temp: 97 8 °F (36 6 °C) 97 8 °F (36 6 °C) 98 2 °F (36 8 °C) 98 4 °F (36 9 °C)   TempSrc: Oral Oral Oral Oral   SpO2:           Most recent labs:    Recent Labs     08/24/22  0505   WBC 14 04*   HGB 11 6   HCT 35 3      K 3 9   CALCIUM 9 4   BUN 8   CREATININE 0 53*   AST 9*   ALT 10   ALKPHOS 60       Scheduled Meds:  Current Facility-Administered Medications   Medication Dose Route Frequency Provider Last Rate    acetaminophen  975 mg Oral Q6H PRN Nina Morrison MD      calcium carbonate  500 mg Oral Daily PRN Dmitry Jean MD      cyclobenzaprine  10 mg Oral TID PRN Hayley Hernandez MD      magnesium oxide  400 mg Oral BID Hayley Hernandez MD      methylPREDNISolone sodium succinate  500 mg Intravenous Q12H [address] Hayley Hernandez MD Stopped (08/24/22 1429)    ondansetron  4 mg Intravenous Q6H PRN Dmitry Jean MD      tetanus-diphtheria-acellular pertussis  0 5 mL Intramuscular Once Hayley Hernandez MD       Continuous Infusions:   PRN Meds:   acetaminophen    calcium carbonate    cyclobenzaprine    ondansetron    Surgical procedures (if appropriate):

## 2022-08-24 NOTE — PLAN OF CARE
Problem: ANTEPARTUM  Goal: Maintain pregnancy as long as maternal and/or fetal condition is stable  Description: INTERVENTIONS:  - Maternal surveillance  - Fetal surveillance  - Monitor uterine activity  - Medications as ordered  - Bedrest  Outcome: Progressing     Problem: PAIN - ADULT  Goal: Verbalizes/displays adequate comfort level or baseline comfort level  Description: Interventions:  - Encourage patient to monitor pain and request assistance  - Assess pain using appropriate pain scale  - Administer analgesics based on type and severity of pain and evaluate response  - Implement non-pharmacological measures as appropriate and evaluate response  - Consider cultural and social influences on pain and pain management  - Notify physician/advanced practitioner if interventions unsuccessful or patient reports new pain  Outcome: Progressing     Problem: INFECTION - ADULT  Goal: Absence or prevention of progression during hospitalization  Description: INTERVENTIONS:  - Assess and monitor for signs and symptoms of infection  - Monitor lab/diagnostic results  - Monitor all insertion sites, i e  indwelling lines, tubes, and drains  - Monitor endotracheal if appropriate and nasal secretions for changes in amount and color  - [place] appropriate cooling/warming therapies per order  - Administer medications as ordered  - Instruct and encourage patient and family to use good hand hygiene technique  - Identify and instruct in appropriate isolation precautions for identified infection/condition  Outcome: Progressing  Goal: Absence of fever/infection during neutropenic period  Description: INTERVENTIONS:  - Monitor WBC    Outcome: Progressing     Problem: SAFETY ADULT  Goal: Patient will remain free of falls  Description: INTERVENTIONS:  - Educate patient/family on patient safety including physical limitations  - Instruct patient to call for assistance with activity   - Consult OT/PT to assist with strengthening/mobility   - Keep Call bell within reach  - Keep bed low and locked with side rails adjusted as appropriate  - Keep care items and personal belongings within reach  - Initiate and maintain comfort rounds  - Make Fall Risk Sign visible to staff  - Apply yellow socks and bracelet for high fall risk patients  - Consider moving patient to room near nurses station  Outcome: Progressing  Goal: Maintain or return to baseline ADL function  Description: INTERVENTIONS:  -  Assess patient's ability to carry out ADLs; assess patient's baseline for ADL function and identify physical deficits which impact ability to perform ADLs (bathing, care of mouth/teeth, toileting, grooming, dressing, etc )  - Assess/evaluate cause of self-care deficits   - Assess range of motion  - Assess patient's mobility; develop plan if impaired  - Assess patient's need for assistive devices and provide as appropriate  - Encourage maximum independence but intervene and supervise when necessary  - Involve family in performance of ADLs  - Assess for home care needs following discharge   - Consider OT consult to assist with ADL evaluation and planning for discharge  - Provide patient education as appropriate  Outcome: Progressing  Goal: Maintains/Returns to pre admission functional level  Description: INTERVENTIONS:  - Perform BMAT or MOVE assessment daily    - Set and communicate daily mobility goal to care team and patient/family/caregiver  - Collaborate with rehabilitation services on mobility goals if consulted  - Out of bed for toileting  - Record patient progress and toleration of activity level   Outcome: Progressing     Problem: Knowledge Deficit  Goal: Patient/family/caregiver demonstrates understanding of disease process, treatment plan, medications, and discharge instructions  Description: Complete learning assessment and assess knowledge base    Interventions:  - Provide teaching at level of understanding  - Provide teaching via preferred learning methods  Outcome: Progressing     Problem: DISCHARGE PLANNING  Goal: Discharge to home or other facility with appropriate resources  Description: INTERVENTIONS:  - Identify barriers to discharge w/patient and caregiver  - Arrange for needed discharge resources and transportation as appropriate  - Identify discharge learning needs (meds, wound care, etc )  - Arrange for interpretive services to assist at discharge as needed  - Refer to Case Management Department for coordinating discharge planning if the patient needs post-hospital services based on physician/advanced practitioner order or complex needs related to functional status, cognitive ability, or social support system  Outcome: Progressing

## 2022-08-24 NOTE — PLAN OF CARE
Problem: ANTEPARTUM  Goal: Maintain pregnancy as long as maternal and/or fetal condition is stable  Description: INTERVENTIONS:  - Maternal surveillance  - Fetal surveillance  - Monitor uterine activity  - Medications as ordered  - Bedrest  Outcome: Progressing     Problem: PAIN - ADULT  Goal: Verbalizes/displays adequate comfort level or baseline comfort level  Description: Interventions:  - Encourage patient to monitor pain and request assistance  - Assess pain using appropriate pain scale  - Administer analgesics based on type and severity of pain and evaluate response  - Implement non-pharmacological measures as appropriate and evaluate response  - Consider cultural and social influences on pain and pain management  - Notify physician/advanced practitioner if interventions unsuccessful or patient reports new pain  Outcome: Progressing     Problem: INFECTION - ADULT  Goal: Absence or prevention of progression during hospitalization  Description: INTERVENTIONS:  - Assess and monitor for signs and symptoms of infection  - Monitor lab/diagnostic results  - Monitor all insertion sites, i e  indwelling lines, tubes, and drains  - Monitor endotracheal if appropriate and nasal secretions for changes in amount and color  - [place] appropriate cooling/warming therapies per order  - Administer medications as ordered  - Instruct and encourage patient and family to use good hand hygiene technique  - Identify and instruct in appropriate isolation precautions for identified infection/condition  Outcome: Progressing  Goal: Absence of fever/infection during neutropenic period  Description: INTERVENTIONS:  - Monitor WBC    Outcome: Progressing     Problem: SAFETY ADULT  Goal: Patient will remain free of falls  Description: INTERVENTIONS:  - Educate patient/family on patient safety including physical limitations  - Instruct patient to call for assistance with activity   - Consult OT/PT to assist with strengthening/mobility   - Keep Call bell within reach  - Keep bed low and locked with side rails adjusted as appropriate  - Keep care items and personal belongings within reach  - Initiate and maintain comfort rounds  - Make Fall Risk Sign visible to staff  - Apply yellow socks and bracelet for high fall risk patients  - Consider moving patient to room near nurses station  Outcome: Progressing  Goal: Maintain or return to baseline ADL function  Description: INTERVENTIONS:  -  Assess patient's ability to carry out ADLs; assess patient's baseline for ADL function and identify physical deficits which impact ability to perform ADLs (bathing, care of mouth/teeth, toileting, grooming, dressing, etc )  - Assess/evaluate cause of self-care deficits   - Assess range of motion  - Assess patient's mobility; develop plan if impaired  - Assess patient's need for assistive devices and provide as appropriate  - Encourage maximum independence but intervene and supervise when necessary  - Involve family in performance of ADLs  - Assess for home care needs following discharge   - Consider OT consult to assist with ADL evaluation and planning for discharge  - Provide patient education as appropriate  Outcome: Progressing  Goal: Maintains/Returns to pre admission functional level  Description: INTERVENTIONS:  - Perform BMAT or MOVE assessment daily    - Set and communicate daily mobility goal to care team and patient/family/caregiver  - Collaborate with rehabilitation services on mobility goals if consulted  - Out of bed for toileting  - Record patient progress and toleration of activity level   Outcome: Progressing     Problem: Knowledge Deficit  Goal: Patient/family/caregiver demonstrates understanding of disease process, treatment plan, medications, and discharge instructions  Description: Complete learning assessment and assess knowledge base    Interventions:  - Provide teaching at level of understanding  - Provide teaching via preferred learning methods  Outcome: Progressing     Problem: DISCHARGE PLANNING  Goal: Discharge to home or other facility with appropriate resources  Description: INTERVENTIONS:  - Identify barriers to discharge w/patient and caregiver  - Arrange for needed discharge resources and transportation as appropriate  - Identify discharge learning needs (meds, wound care, etc )  - Arrange for interpretive services to assist at discharge as needed  - Refer to Case Management Department for coordinating discharge planning if the patient needs post-hospital services based on physician/advanced practitioner order or complex needs related to functional status, cognitive ability, or social support system  Outcome: Progressing

## 2022-08-24 NOTE — UTILIZATION REVIEW
Inpatient Admission Authorization Request   NOTIFICATION OF INPATIENT ADMISSION/INPATIENT AUTHORIZATION REQUEST   SERVICING FACILITY:   [address], [address] [place] [address]  Tax ID: [id number]  NPI: [id number]  Place of Service: Inpatient [facility]  [address]  Place of Service Code: 24     ATTENDING PROVIDER:  Attending Name and NPI#: Yomaira Velez, [address] [[id number]]  Address: [address], [address] [place] [address]  Phone: [phone]     UTILIZATION REVIEW CONTACT:  Fauzia Hansen Utilization Review Supervisor  Network Utilization Review Department  Phone: [phone]  Fax: [phone]  Email: [email or web address]     PHYSICIAN ADVISORY SERVICES:  FOR [id number] REVIEW - MEDICAL NECESSITY DENIAL  Phone: [phone]  Fax: [phone]  Email: [email or web address]     TYPE OF REQUEST:  Inpatient Status     ADMISSION INFORMATION:  ADMISSION DATE/TIME: 8/24/22  3:33 PM  PATIENT DIAGNOSIS CODE/DESCRIPTION:  No admission diagnoses are documented for this encounter  DISCHARGE DATE/TIME: No discharge date for patient encounter  IMPORTANT INFORMATION:  Please contact Fauzia Hansen directly with any questions or concerns regarding this request  Department voicemails are confidential     Send requests for admission clinical reviews, concurrent reviews, approvals, and administrative denials due to lack of clinical to fax [phone]

## 2022-08-24 NOTE — UTILIZATION REVIEW
Continued Stay Review    OBS 08-22-22 @ [address] [id number] 08-24-22 @ [facility] NEED FOR IV STERIODS DURING PREGNANCY    Inpatient Admission  Once        Transfer Service: OB/GYN       Question Answer Comment   Level of Care Med Surg    Estimated length of stay More than 2 Midnights    Certification I certify that inpatient services are medically necessary for this patient for a duration of greater than two midnights  See H&P and MD Progress Notes for additional information about the patient's course of treatment  08/24/22 1533       Date: 08-24-22                          Current Patient Class: observation   Current Level of Care: medical    HPI:26 y o  female initially admitted on * 08-22-22    Assessment/Plan:     08-23-22 Patient continues with headache patient has had IV MGSO4, IV caffeine and now will start IV Steroids    Patient denies cramping leaking for bleeding also denies vision changes, RUQ/epigastric pain, chest pain, shortness of breath, lower extremity pain  Fetal data:  Baseline: 125 bpm  Variability: moderate  Accelerations: present, 15x15  Decelerations: absent  Contractions: absent  Assessment: reactive  Plan: daily   Neurology consult   MRI brain unremarkable for acute infarct, however did demonstrate low lying cerebellar tonsils 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 Defer continuation of magnesium sulfate to OBGYN  ? Recommend Solumedrol 500 BID starting 08/23 x 2 doses   ? No improvement with Gabapentin 100 TID, okay to discontinue   ? Will trial sumatriptan subcu 6 mg x 1  ? Will also trial caffeine sodium benzoate 500 mg x1   ? Continue Acetaminophen PRN  ?  Aqua K  - Would not recommend frequent use of Fioricet     Vital Signs:   Date/Time Temp Pulse Resp BP SpO2 O2 Device Cardiac (WDL) Patient Position - Orthostatic VS   08/24/22 0518 97 8 °F (36 6 °C) 72 16 124/62 -- -- -- --   08/23/22 2350 97 8 °F (36 6 °C) 71 16 113/53  -- -- -- --   BP: nurse Massiel Guzman notified at 08/23/22 2350   08/23/22 2044 98 1 °F (36 7 °C) 76 16 131/62 95 % -- -- --   08/23/22 2000 -- -- -- -- -- None (Room air) WDL --   08/23/22 1615 -- -- -- -- -- None (Room air) WDL --   08/23/22 1610 98 1 °F (36 7 °C) 62 20 138/67 97 % -- -- --   08/23/22 1205 98 °F (36 7 °C) 67 20 121/70 97 % -- -- --   08/23/22 0950 -- -- -- -- -- None (Room air) WDL          Pertinent Labs/Diagnostic Results:       Results from last 7 days   Lab Units 08/24/22  0505 08/22/22  1132 08/20/22 1723 08/19/22  1304   WBC Thousand/uL 14 04* 13 05* 17 29* 13 25*   HEMOGLOBIN g/dL 11 6 12 1 12 3 12 8   HEMATOCRIT % 35 3 35 4 36 5 38 8   PLATELETS Thousands/uL 293 286 289 287         Results from last 7 days   Lab Units 08/24/22  0505 08/22/22  1132 08/20/22  1723 08/19/22  1304   SODIUM mmol/L 136 139 137 134*   POTASSIUM mmol/L 3 9 3 9 3 8 4 1   CHLORIDE mmol/L 107 107 106 105   CO2 mmol/L 21 22 22 23   ANION GAP mmol/L 8 10 9 6   BUN mg/dL 8 8 7 5   CREATININE mg/dL 0 53* 0 61 0 65 0 57*   EGFR ml/min/1 73sq m 133 127 124 130   CALCIUM mg/dL 9 4 9 5 9 8 9 6     Results from last 7 days   Lab Units 08/24/22  0505 08/22/22  1132 08/20/22  1723 08/19/22  1304   AST U/L 9* 12* 13 12*   ALT U/L 10 11 11 9   ALK PHOS U/L 60 71 77 84   TOTAL PROTEIN g/dL 6 0* 6 6 6 7 6 8   ALBUMIN g/dL 3 3* 3 6 3 7 3 6   TOTAL BILIRUBIN mg/dL 0 21 0 27 0 32 0 31         Results from last 7 days   Lab Units 08/24/22  0505 08/22/22  1132 08/20/22  1723 08/19/22  1304   GLUCOSE RANDOM mg/dL 130 104 95 77       Results from last 7 days   Lab Units 08/22/22  1132 08/20/22  1723 08/19/22  1304   CLARITY UA   --  Turbid  --    COLOR UA   --  Yellow  --    SPEC GRAV UA   --  1 017  --    PH UA   --  5 5  --    GLUCOSE UA mg/dl  --  Negative  -- KETONES UA mg/dl  --  20 (1+)*  --    BLOOD UA   --  Negative  --    PROTEIN UA mg/dl  --  Trace*  --    NITRITE UA   --  Negative  --    BILIRUBIN UA   --  Negative  --    UROBILINOGEN UA (BE) mg/dl  --  <2 0  --    LEUKOCYTES UA   --  Small*  --    WBC UA /hpf  --  20-30*  --    RBC UA /hpf  --  1-2  --    BACTERIA UA /hpf  --  Occasional  --    EPITHELIAL CELLS WET PREP /hpf  --  Moderate*  --    MUCUS THREADS   --  Occasional*  --    CREATININE UR mg/dL 210 2 153 9 49 6   PROTEIN UR mg/dL 37 27 7   PROT/CREAT RATIO UR  0 18* 0 18* 0 14*       Results from last 7 days   Lab Units 08/20/22  1723   URINE CULTURE  >100,000 cfu/ml                  Medications:   Scheduled Medications:  magnesium oxide, 400 mg, Oral, BID  tetanus-diphtheria-acellular pertussis, 0 5 mL, Intramuscular, Once      Continuous IV Infusions:     PRN Meds:  acetaminophen, 975 mg, Oral, Q6H PRN  calcium carbonate, 500 mg, Oral, Daily PRN  cyclobenzaprine, 10 mg, Oral, TID PRN  ondansetron, 4 mg, Intravenous, Q6H PRN        Discharge Plan:  Home when medically stable    Network Utilization Review Department  ATTENTION: Please call with any questions or concerns to [phone] and carefully listen to the prompts so that you are directed to the right person  All voicemails are confidential   Wily Charles all requests for admission clinical reviews, approved or denied determinations and any other requests to dedicated fax number below belonging to the campus where the patient is receiving treatment   List of dedicated fax numbers for the Facilities:  [address] DENIALS (Administrative/Medical Necessity) [phone]   [address] [place] [address] (Maternity/NICU/Pediatrics) 261 [facility],[address] [place] [address] [place] [address] [facility] 3427 [address] [place] [address] Malik Rossi 1481 [address] 333 [facility] [phone]

## 2022-08-25 VITALS
SYSTOLIC BLOOD PRESSURE: 135 MMHG | TEMPERATURE: 98.6 F | RESPIRATION RATE: 18 BRPM | HEART RATE: 78 BPM | DIASTOLIC BLOOD PRESSURE: 70 MMHG | OXYGEN SATURATION: 96 %

## 2022-08-25 LAB — GP B STREP DNA SPEC QL NAA+PROBE: NEGATIVE

## 2022-08-25 PROCEDURE — 59025 FETAL NON-STRESS TEST: CPT | Performed by: OBSTETRICS & GYNECOLOGY

## 2022-08-25 PROCEDURE — 99238 HOSP IP/OBS DSCHRG MGMT 30/<: CPT | Performed by: OBSTETRICS & GYNECOLOGY

## 2022-08-25 PROCEDURE — 90715 TDAP VACCINE 7 YRS/> IM: CPT | Performed by: OBSTETRICS & GYNECOLOGY

## 2022-08-25 PROCEDURE — 99232 SBSQ HOSP IP/OBS MODERATE 35: CPT | Performed by: PSYCHIATRY & NEUROLOGY

## 2022-08-25 PROCEDURE — NC001 PR NO CHARGE: Performed by: OBSTETRICS & GYNECOLOGY

## 2022-08-25 RX ORDER — CYPROHEPTADINE HYDROCHLORIDE 4 MG/1
4 TABLET ORAL 3 TIMES DAILY
Qty: 60 TABLET | Refills: 0 | Status: SHIPPED | OUTPATIENT
Start: 2022-08-25

## 2022-08-25 RX ORDER — CYPROHEPTADINE HYDROCHLORIDE 4 MG/1
4 TABLET ORAL 3 TIMES DAILY
Status: DISCONTINUED | OUTPATIENT
Start: 2022-08-25 | End: 2022-08-25 | Stop reason: HOSPADM

## 2022-08-25 RX ORDER — CYCLOBENZAPRINE HCL 10 MG
10 TABLET ORAL 3 TIMES DAILY PRN
Qty: 30 TABLET | Refills: 0 | Status: SHIPPED | OUTPATIENT
Start: 2022-08-25

## 2022-08-25 RX ADMIN — MAGNESIUM OXIDE TAB 400 MG (241.3 MG ELEMENTAL MG) 400 MG: 400 (241.3 MG) TAB at 08:33

## 2022-08-25 RX ADMIN — TETANUS TOXOID, REDUCED DIPHTHERIA TOXOID AND ACELLULAR PERTUSSIS VACCINE, ADSORBED 0.5 ML: 5; 2.5; 8; 8; 2.5 SUSPENSION INTRAMUSCULAR at 10:12

## 2022-08-25 RX ADMIN — ACETAMINOPHEN 975 MG: 325 TABLET ORAL at 08:33

## 2022-08-25 RX ADMIN — CYPROHEPTADINE HYDROCHLORIDE 4 MG: 4 TABLET ORAL at 10:11

## 2022-08-25 RX ADMIN — ACETAMINOPHEN 975 MG: 325 TABLET ORAL at 01:20

## 2022-08-25 RX ADMIN — SODIUM CHLORIDE 500 MG: 0.9 INJECTION, SOLUTION INTRAVENOUS at 01:15

## 2022-08-25 NOTE — PROGRESS NOTES
Progress Note - Maternal Fetal Medicine   Dimple Petties 27 y o  female MRN: [id number]  Unit/Bed#: -01 Encounter: [id number]  Admit date: 2022  Today's date: 22    Assessment/Plan     Ms Waleska Little is a 27 y o   at [address] [facility] Day: 4, admitted with headache in the setting of gestational hypertension, improved  By issue:    Persistent headaches  Assessment & Plan  - MRI/MRV negative for acute intracranial pathology  Chiari I malformation present, but unlikely to be source of persistent headache  - Given normal blood pressures and normal laboratory evaluations, less likely headache is preeclamptic in origin, however, will continue to monitor for signs/symptoms of developing preeclampsia  - Therapies tried to date: HA cocktail (IV Benadryl, IV Mag sulfate, IV Reglan), Sumatriptan sc, gabapentin 100 mg TID, IV caffeine, Flexeril, Solumedrol 500 mg IV BID x 4 doses  - Neurology following, appreciate continued recommendations  - Most improvement in symptoms following addition of flexeril   - Continue PO Magnesium, Riboflavin 400 mg Flexeril 10 mg TID PRN  - Overall improvement of headache to 3-4/10, but unable to completely resolve headache at this time  Cyproheptadine added overnight   Will discuss next steps with neurology, consider more frequent cyproheptadine dosing, initiation of medrol dosepak, zyprexa      * Gestational hypertension, third trimester  Assessment & Plan  Normotensive during admission  No other symptoms - vision changes, RUQ/epigastric pain, chest pain, shortness of breath, lower extremity pain  24h urine protein 266, P/Cr 0 18 - unchanged from previous  Will need twice weekly NST when discharged    Systolic (48MXV), SSK:899 , Min:116 , NYP:646   Diastolic (44NCF), QIX:14, Min:56, Max:65    Lab Results   Component Value Date    ALT 10 2022    AST 9 (L) 2022    ALKPHOS 60 2022             32 weeks gestation of pregnancy  Assessment & Plan  NST Daily  S/p Betamethasone  GBS pending  Normal 1h gtt  TDaP per patient request           Subjective    Contractions: no  Loss of fluid: no  Vaginal bleeding: no  Fetal movement: yes    Pain: no  Headaches: still 3-4/10  No changes in symptoms or headache location  Visual changes: no  Chest pain: no  Shortness of breath: no  Nausea: no  Vomiting/Diarrhea: no  Dysuria: no  Leg pain: no          Objective      Patient Vitals for the past 24 hrs:   BP Temp Temp src Pulse Resp SpO2   08/25/22 0819 135/70 98 6 °F (37 °C) Oral 78 18 --   08/25/22 0100 116/56 98 5 °F (36 9 °C) Oral 76 18 96 %   08/24/22 1649 136/65 (!) 97 4 °F (36 3 °C) Oral 97 18 97 %   08/24/22 1158 120/56 98 4 °F (36 9 °C) Oral 75 20 --       Physical Exam  Constitutional:       General: She is not in acute distress  Appearance: Normal appearance  She is well-developed and normal weight  She is not ill-appearing, toxic-appearing or diaphoretic  HENT:      Head: Normocephalic and atraumatic  Eyes:      General: No scleral icterus  Conjunctiva/sclera: Conjunctivae normal    Cardiovascular:      Rate and Rhythm: Normal rate  Pulmonary:      Effort: Pulmonary effort is normal  No respiratory distress  Abdominal:      Palpations: Abdomen is soft  Tenderness: There is no abdominal tenderness  Skin:     General: Skin is warm and dry  Neurological:      General: No focal deficit present  Mental Status: She is alert and oriented to person, place, and time     Psychiatric:         Mood and Affect: Mood normal          Behavior: Behavior normal          I/O       08/22 0701  08/23 0700 08/23 0701  08/24 0700 08/24 0701  08/25 0700    Urine  1200     Total Output  1200     Net  -1200                  Invasive Devices  Timeline    Peripheral Intravenous Line  Duration           Peripheral IV 08/22/22 Left Antecubital 2 days                Results from last 7 days   Lab Units 08/24/22  0505 08/22/22  1132 08/20/22  1723 08/19/22  1304 WBC Thousand/uL 14 04* 13 05* 17 29* 13 25*   HEMOGLOBIN g/dL 11 6 12 1 12 3 12 8   MCV fL 89 87 87 87   PLATELETS Thousands/uL 293 286 289 287     Results from last 7 days   Lab Units 08/24/22  0505 08/22/22  1132 08/20/22  1723 08/19/22  1304   POTASSIUM mmol/L 3 9 3 9 3 8 4 1   CHLORIDE mmol/L 107 107 106 105   CO2 mmol/L 21 22 22 23   BUN mg/dL 8 8 7 5   CREATININE mg/dL 0 53* 0 61 0 65 0 57*   EGFR ml/min/1 73sq m 133 127 124 130     Results from last 7 days   Lab Units 08/24/22  0505 08/22/22  1132 08/20/22  1723 08/19/22  1304   AST U/L 9* 12* 13 12*   ALT U/L 10 11 11 9     Results from last 7 days   Lab Units 08/24/22  0505 08/22/22  1132 08/20/22  1723 08/19/22  1304   PLATELETS Thousands/uL 293 286 289 287         Results from last 7 days   Lab Units 08/22/22  1132 08/20/22  1723 08/19/22  1304   PROT/CREAT RATIO UR  0 18* 0 18* 0 14*         Results from last 7 days   Lab Units 08/24/22  1455   PROTEIN 24H UR mg/24 hrs 266*     Results from last 7 days   Lab Units 08/20/22  1723   URINE CULTURE  >100,000 cfu/ml        Fetal data:  Nonstress test: date 08/25/22 Time: 9492-9930  Baseline: 135 bpm  Variability: moderate  Accelerations: present, 15x15  Decelerations: absent  Contractions: absent  Assessment: reactive  Plan: daily       Current Facility-Administered Medications   Medication Dose Route Frequency    acetaminophen (TYLENOL) tablet 975 mg  975 mg Oral Q6H PRN    calcium carbonate (TUMS) chewable tablet 500 mg  500 mg Oral Daily PRN    cyclobenzaprine (FLEXERIL) tablet 10 mg  10 mg Oral TID PRN    cyproheptadine (PERIACTIN) tablet 4 mg  4 mg Oral HS    magnesium oxide (MAG-OX) tablet 400 mg  400 mg Oral BID    ondansetron (ZOFRAN) injection 4 mg  4 mg Intravenous Q6H PRN    tetanus-diphtheria-acellular pertussis (BOOSTRIX) IM injection 0 5 mL  0 5 mL Intramuscular Once            Riley Gatica MD  OBGYN, PGY-3  8/25/2022  8:21 AM

## 2022-08-25 NOTE — PLAN OF CARE
Problem: ANTEPARTUM  Goal: Maintain pregnancy as long as maternal and/or fetal condition is stable  Description: INTERVENTIONS:  - Maternal surveillance  - Fetal surveillance  - Monitor uterine activity  - Medications as ordered  - Bedrest  Outcome: Adequate for Discharge     Problem: PAIN - ADULT  Goal: Verbalizes/displays adequate comfort level or baseline comfort level  Description: Interventions:  - Encourage patient to monitor pain and request assistance  - Assess pain using appropriate pain scale  - Administer analgesics based on type and severity of pain and evaluate response  - Implement non-pharmacological measures as appropriate and evaluate response  - Consider cultural and social influences on pain and pain management  - Notify physician/advanced practitioner if interventions unsuccessful or patient reports new pain  Outcome: Adequate for Discharge     Problem: INFECTION - ADULT  Goal: Absence or prevention of progression during hospitalization  Description: INTERVENTIONS:  - Assess and monitor for signs and symptoms of infection  - Monitor lab/diagnostic results  - Monitor all insertion sites, i e  indwelling lines, tubes, and drains  - Monitor endotracheal if appropriate and nasal secretions for changes in amount and color  - [place] appropriate cooling/warming therapies per order  - Administer medications as ordered  - Instruct and encourage patient and family to use good hand hygiene technique  - Identify and instruct in appropriate isolation precautions for identified infection/condition  Outcome: Adequate for Discharge  Goal: Absence of fever/infection during neutropenic period  Description: INTERVENTIONS:  - Monitor WBC    Outcome: Adequate for Discharge     Problem: SAFETY ADULT  Goal: Patient will remain free of falls  Description: INTERVENTIONS:  - Educate patient/family on patient safety including physical limitations  - Instruct patient to call for assistance with activity   - Consult OT/PT to assist with strengthening/mobility   - Keep Call bell within reach  - Keep bed low and locked with side rails adjusted as appropriate  - Keep care items and personal belongings within reach  - Initiate and maintain comfort rounds  - Make Fall Risk Sign visible to staff  - Apply yellow socks and bracelet for high fall risk patients  - Consider moving patient to room near nurses station  Outcome: Adequate for Discharge  Goal: Maintain or return to baseline ADL function  Description: INTERVENTIONS:  -  Assess patient's ability to carry out ADLs; assess patient's baseline for ADL function and identify physical deficits which impact ability to perform ADLs (bathing, care of mouth/teeth, toileting, grooming, dressing, etc )  - Assess/evaluate cause of self-care deficits   - Assess range of motion  - Assess patient's mobility; develop plan if impaired  - Assess patient's need for assistive devices and provide as appropriate  - Encourage maximum independence but intervene and supervise when necessary  - Involve family in performance of ADLs  - Assess for home care needs following discharge   - Consider OT consult to assist with ADL evaluation and planning for discharge  - Provide patient education as appropriate  Outcome: Adequate for Discharge  Goal: Maintains/Returns to pre admission functional level  Description: INTERVENTIONS:  - Perform BMAT or MOVE assessment daily    - Set and communicate daily mobility goal to care team and patient/family/caregiver  - Collaborate with rehabilitation services on mobility goals if consulted  - Out of bed for toileting  - Record patient progress and toleration of activity level   Outcome: Adequate for Discharge     Problem: Knowledge Deficit  Goal: Patient/family/caregiver demonstrates understanding of disease process, treatment plan, medications, and discharge instructions  Description: Complete learning assessment and assess knowledge base    Interventions:  - Provide teaching at level of understanding  - Provide teaching via preferred learning methods  Outcome: Adequate for Discharge     Problem: DISCHARGE PLANNING  Goal: Discharge to home or other facility with appropriate resources  Description: INTERVENTIONS:  - Identify barriers to discharge w/patient and caregiver  - Arrange for needed discharge resources and transportation as appropriate  - Identify discharge learning needs (meds, wound care, etc )  - Arrange for interpretive services to assist at discharge as needed  - Refer to Case Management Department for coordinating discharge planning if the patient needs post-hospital services based on physician/advanced practitioner order or complex needs related to functional status, cognitive ability, or social support system  Outcome: Adequate for Discharge

## 2022-08-25 NOTE — DISCHARGE SUMMARY
[facility]  Discharge- Roel Washington 1995, 27 y o  female MRN: [id number]  Unit/Bed#: -01 Encounter: [id number]  Primary Care Provider: Maurita Felty, MD   Date and time admitted to hospital: 8/22/2022 10:57 AM    Persistent headaches  Assessment & Plan  - MRI/MRV negative for acute intracranial pathology   Chiari I malformation present, but unlikely to be source of persistent headache  - Given normal blood pressures and normal laboratory evaluations, less likely headache is preeclamptic in origin, however, will continue to monitor for signs/symptoms of developing preeclampsia  - Therapies tried to date: HA cocktail (IV Benadryl, IV Mag sulfate, IV Reglan), Sumatriptan sc, gabapentin 100 mg TID, IV caffeine, Flexeril, Solumedrol 500 mg IV BID x 4 doses  - Neurology following, appreciate continued recommendations  - Most improvement in symptoms following addition of flexeril   - Continue PO Magnesium, Riboflavin 400 mg Flexeril 10 mg TID PRN  - Resolution of headache following administration of Cyproheptadine      * Gestational hypertension, third trimester  Assessment & Plan  Normotensive during admission  No other symptoms - vision changes, RUQ/epigastric pain, chest pain, shortness of breath, lower extremity pain  24h urine protein 266, P/Cr 0 18 - unchanged from previous  Will need twice weekly NST when discharged    Systolic (55JNY), KOI:690 , Min:116 , TOQ:619   Diastolic (62ZUI), LGD:23, Min:56, Max:65    Lab Results   Component Value Date    ALT 10 08/24/2022    AST 9 (L) 08/24/2022    ALKPHOS 60 08/24/2022             32 weeks gestation of pregnancy  Assessment & Plan  NST Daily  S/p Betamethasone  GBS pending  Normal 1h gtt  TDaP per patient request      Admission Date:   Admission Orders (From admission, onward)     Ordered        08/24/22 1533  Inpatient Admission  Once            08/22/22 1610  Place in Observation  Once                        Admitting Diagnosis:   1  Pregnancy at 32w2d  2  Gestational hypertension  3  Persistent headache  4  History of migraines    HPI: Selina Abraham is a 27 y o  Curly Creed with an FREDY of 10/15/2022, by Last Menstrual Period at [place] [address] who is being admitted for intractable headache  She denies having uterine contractions, has no LOF, and reports no VB  She states she has felt good FM  She has a diagnosis of GHTN She was here on both Friday afternoon and Saturday night into Sunday with the same complaint  On Friday she received solumderol, reglan, mag and it improved and she was discharged home  Her headache came back within a few hours and so she presented again on Saturday  Received reglan, benadryl, solumedrol, mag again with no relief  By Sunday her headache was a 5 (from an 8 on Saturday) and she requested discharge home  She was discharged with reglan and tylenol which she has taken once without relief  It's still at a 5  On a home blood pressure cuff she reports a pressure of 146/85  Her pressures here today are 130s/70s  Of note she has a history of migraines  Earlier in the pregnancy she was taking fioricet which was providing relief before last week  Procedures Performed: None    Summary of Hospital Course: Patient underwent MRI/MRV which was negative for acute intracranial pathology  She did have an incidental finding of Chiari I malformation which was not suspected to be the cause of her migraine headache  Neurology consultation was placed  She had normal neurologic exam without evidence of deficits  Her blood pressures were well controlled without use of oral antihypertensives and headache was not thought to be preeclamptic in origin  Her protein creatinine ratio was within normal limits in 24 urine protein was within normal limits as well  Fetal testing during her admission was within normal limits  She received 2 IV headache cocktails and then was started on Solu-Medrol 500 mg b i d  IV for 4 doses    She received Imitrex, which did not resolve her headache  Following IV Solu-Medrol dosing in addition to Flexeril with cyproheptadine, patient's headache was noted to resolve and she was medically cleared for discharge home with plan for twice weekly  testing in outpatient setting  She received Tdap vaccine prior to discharge  Significant Findings, Care, Treatment and Services Provided:  Incidental finding of Chiari 1 malformation, otherwise normal neurologic imaging    Complications:  None apparent    Condition at Discharge: stable         Discharge instructions/Information to patient and family:   See after visit summary for information provided to patient and family  Provisions for Follow-Up Care:  See after visit summary for information related to follow-up care and any pertinent home health orders  PCP: Momo Hernandez MD    Disposition: Home    Planned Readmission: No    Discharge Statement   I spent 20 minutes discharging the patient  This time was spent on the day of discharge  I had direct contact with the patient on the day of discharge  Additional documentation is required if more than 30 minutes were spent on discharge  Discharge Medications:  See after visit summary for reconciled discharge medications provided to patient and family

## 2022-08-25 NOTE — PROGRESS NOTES
Progress Note - Neurology   Roel Washington 27 y o  female MRN: [id number]  Unit/Bed#: -01 Encounter: [id number]      Assessment/Plan   Persistent headaches  Assessment & Plan  Roel Washington is a 27 y o   female at 30w10d with migraines and gestational hypertension who presents to [facility] on 2022 with persistent headache  Intractable headache in a patient with a history of migraines  MRV head unremarkable for dural venous sinus thrombosis  MRI brain unremarkable for acute infarct, however did demonstrate low lying cerebellar tonsils concerning for Chiari I malformation without evidence of hydrocephalus, previously diagnoed in 2018 per discussion with patient  Unlikely Chiari I malformation is attributing to headache  Suspicion for atypical migraine with cervicogenic component, likely in the setting of hormonal changes and fluid shifts of pregnancy  Low suspicion for preeclamptic origin per discussion with maternal fetal medicine  Plan:  - S/p IV Benadryl, IV Mag sulfate, IV Reglan   - Headache regimen  · Defer continuation of magnesium sulfate to OBGYN  · S/p Solumedrol 500 BID x 2 doses ()   · No improvement with Gabapentin 100 TID, medication discontinued  · Trialed sumatriptan subcu 6 mg x 1 and caffeine sodium benzoate 500 mg x1 with some improvement   · Continue Acetaminophen PRN  · Aqua K  · Started on prophylactic medication Cyproheptadine 4 mg TID  · Avoid beta blockers and calcium channel blockers as medications may mask HTN  · Would not recommend frequent use of Fioricet   - BP management per primary team, goal normotension   - Medical management and supportive care per primary team, notify with changes  - Follow up with outpatient neurology headache team in 4-6 weeks    Roel Washington will need follow up in in 4 weeks with general or headache attending or advance practitioner  She will not require outpatient neurological testing      Subjective: Patient reports her headache is a 3/10 in the left temporal region  She states she is ready to go home  On re-examination with attending neurologist and maternal fetal Medicine, patient reported significant improvement in her headache, stating 1/10 pain  Patient states that she was able to ambulate around the hospital and was able to go outside to see her dog without any difficulty  Vitals: Blood pressure 135/70, pulse 78, temperature 98 6 °F (37 °C), temperature source Oral, resp  rate 18, last menstrual period 01/08/2022, SpO2 96 %  ,There is no height or weight on file to calculate BMI  Physical Exam  Vitals and nursing note reviewed  Constitutional:       General: She is not in acute distress  Appearance: She is normal weight  She is not ill-appearing, toxic-appearing or diaphoretic  HENT:      Head: Normocephalic and atraumatic  Eyes:      General: No scleral icterus  Right eye: No discharge  Left eye: No discharge  Extraocular Movements: Extraocular movements intact and EOM normal       Conjunctiva/sclera: Conjunctivae normal       Pupils: Pupils are equal, round, and reactive to light  Musculoskeletal:         General: Normal range of motion  Cervical back: Normal range of motion and neck supple  Skin:     General: Skin is warm and dry  Coloration: Skin is not jaundiced or pale  Findings: No bruising, erythema, lesion or rash  Neurological:      Mental Status: She is alert  Deep Tendon Reflexes: Strength normal    Psychiatric:         Mood and Affect: Mood normal          Behavior: Behavior normal          Thought Content: Thought content normal          Judgment: Judgment normal        Neurologic Exam     Mental Status   Patient is alert, lying in bed  Oriented x 3  No dysarthria or aphasia noted  Able to follow central and appendicular commands, and answers all questions appropriately        Cranial Nerves     CN II   Visual fields full to confrontation  CN III, IV, VI   Pupils are equal, round, and reactive to light  Extraocular motions are normal    Nystagmus: none   Upgaze: normal  Downgaze: normal  Conjugate gaze: present    CN V   Facial sensation intact  CN VII   Facial expression full, symmetric  CN XI   CN XI normal      CN XII   Tongue deviation: none    Motor Exam   Muscle bulk: normal  Overall muscle tone: normal  Right arm pronator drift: absent  Left arm pronator drift: absent    Strength   Strength 5/5 throughout  Sensory Exam   Light touch normal    No evidence of extinction with bilateral simultaneous stimulation      Gait, Coordination, and Reflexes     Tremor   Resting tremor: absent  No ataxia or dysmetria noted in BUE finger to nose testing  No involuntary movements noted throughout exam      Lab Results: I have personally reviewed pertinent reports  Recent Results (from the past 24 hour(s))   Protein, urine, 24 hour    Collection Time: 08/24/22  2:55 PM   Result Value Ref Range    24H Urine Volume 3,800 mL    Protein, 24H Urine 266 (H) 40 - 150 mg/24 hrs   ]  Imaging Studies: I have personally reviewed pertinent reports and I have personally reviewed pertinent films in PACS  EKG, Pathology, and Other Studies: I have personally reviewed pertinent reports  Counseling / Coordination of Care  Total time spent today 22 minutes  Greater than 50% of total time was spent with the patient and/or family counseling and/or coordination of care  A description of the counseling/coordination of care:  Patient was seen and evaluated  Discussed with attending  Chart reviewed thoroughly including laboratory and imaging studies  Plan of care discussed with patient and primary team  Discussed plan to continue current medications and follow up with outpatient headache team on discharge  Dictation voice to text software has been used in the creation of this document    Please consider this in light of any contextual or grammatical errors

## 2022-08-25 NOTE — PLAN OF CARE
Problem: ANTEPARTUM  Goal: Maintain pregnancy as long as maternal and/or fetal condition is stable  Description: INTERVENTIONS:  - Maternal surveillance  - Fetal surveillance  - Monitor uterine activity  - Medications as ordered  - Bedrest  Outcome: Progressing     Problem: PAIN - ADULT  Goal: Verbalizes/displays adequate comfort level or baseline comfort level  Description: Interventions:  - Encourage patient to monitor pain and request assistance  - Assess pain using appropriate pain scale  - Administer analgesics based on type and severity of pain and evaluate response  - Implement non-pharmacological measures as appropriate and evaluate response  - Consider cultural and social influences on pain and pain management  - Notify physician/advanced practitioner if interventions unsuccessful or patient reports new pain  Outcome: Progressing     Problem: INFECTION - ADULT  Goal: Absence or prevention of progression during hospitalization  Description: INTERVENTIONS:  - Assess and monitor for signs and symptoms of infection  - Monitor lab/diagnostic results  - Monitor all insertion sites, i e  indwelling lines, tubes, and drains  - Monitor endotracheal if appropriate and nasal secretions for changes in amount and color  - [place] appropriate cooling/warming therapies per order  - Administer medications as ordered  - Instruct and encourage patient and family to use good hand hygiene technique  - Identify and instruct in appropriate isolation precautions for identified infection/condition  Outcome: Progressing  Goal: Absence of fever/infection during neutropenic period  Description: INTERVENTIONS:  - Monitor WBC    Outcome: Progressing     Problem: SAFETY ADULT  Goal: Patient will remain free of falls  Description: INTERVENTIONS:  - Educate patient/family on patient safety including physical limitations  - Instruct patient to call for assistance with activity   - Consult OT/PT to assist with strengthening/mobility   - Keep Call bell within reach  - Keep bed low and locked with side rails adjusted as appropriate  - Keep care items and personal belongings within reach  - Initiate and maintain comfort rounds  - Make Fall Risk Sign visible to staff  -  - Apply yellow socks and bracelet for high fall risk patients  - Consider moving patient to room near nurses station  Outcome: Progressing  Goal: Maintain or return to baseline ADL function  Description: INTERVENTIONS:  -  Assess patient's ability to carry out ADLs; assess patient's baseline for ADL function and identify physical deficits which impact ability to perform ADLs (bathing, care of mouth/teeth, toileting, grooming, dressing, etc )  - Assess/evaluate cause of self-care deficits   - Assess range of motion  - Assess patient's mobility; develop plan if impaired  - Assess patient's need for assistive devices and provide as appropriate  - Encourage maximum independence but intervene and supervise when necessary  - Involve family in performance of ADLs  - Assess for home care needs following discharge   - Consider OT consult to assist with ADL evaluation and planning for discharge  - Provide patient education as appropriate  Outcome: Progressing  Goal: Maintains/Returns to pre admission functional level  Description: INTERVENTIONS:  - Perform BMAT or MOVE assessment daily    - Set and communicate daily mobility goal to care team and patient/family/caregiver  - Collaborate with rehabilitation services on mobility goals if consulted  - Out of bed for toileting  - Record patient progress and toleration of activity level   Outcome: Progressing     Problem: Knowledge Deficit  Goal: Patient/family/caregiver demonstrates understanding of disease process, treatment plan, medications, and discharge instructions  Description: Complete learning assessment and assess knowledge base    Interventions:  - Provide teaching at level of understanding  - Provide teaching via preferred learning methods  Outcome: Progressing     Problem: DISCHARGE PLANNING  Goal: Discharge to home or other facility with appropriate resources  Description: INTERVENTIONS:  - Identify barriers to discharge w/patient and caregiver  - Arrange for needed discharge resources and transportation as appropriate  - Identify discharge learning needs (meds, wound care, etc )  - Arrange for interpretive services to assist at discharge as needed  - Refer to Case Management Department for coordinating discharge planning if the patient needs post-hospital services based on physician/advanced practitioner order or complex needs related to functional status, cognitive ability, or social support system  Outcome: Progressing

## 2022-08-25 NOTE — QUICK NOTE
Patient seen and examined agree R3 mfm note  Continue current management await further neurology input    Is improved consider if continue stable or further improved for discharge planning

## 2022-08-26 ENCOUNTER — TELEPHONE (OUTPATIENT)
Dept: OBGYN CLINIC | Facility: CLINIC | Age: 27
End: 2022-08-26

## 2022-08-26 NOTE — UTILIZATION REVIEW
Notification of Discharge   This is a Notification of Discharge from our facility [address]  Please be advised that this patient has been discharge from our facility  Below you will find the admission and discharge date and time including the patients disposition  UTILIZATION REVIEW CONTACT:  Gabriela Sandra  Utilization   Network Utilization Review Department  Phone: [phone] x carefully listen to the prompts  All voicemails are confidential   Email: [email or web address]     PHYSICIAN ADVISORY SERVICES:  FOR [id number] REVIEW - MEDICAL NECESSITY DENIAL  Phone: [phone]  Fax: [phone]  Email: [email or web address]     PRESENTATION DATE: 8/22/2022 10:57 AM  OBERVATION ADMISSION DATE:   INPATIENT ADMISSION DATE: 8/24/22  3:33 PM   DISCHARGE DATE: 8/25/2022  2:58 PM  DISPOSITION: PRA - Home PRA - Home      IMPORTANT INFORMATION:  Send all requests for admission clinical reviews, approved or denied determinations and any other requests to dedicated fax number below belonging to the campus where the patient is receiving treatment   List of dedicated fax numbers:  [address] DENIALS (Administrative/Medical Necessity) [phone]   [address] (Maternity/NICU/Pediatrics) [phone]   Josefa Conn [phone]   [address] [phone]   [address] [place] [address] [phone]   2000 [facility] [address] [place] [address],[address] [place] [address] [address] [place] [address] [phone]   [facility]  [phone]   [address] [facility] [address]  24[address] [facility] 1000 [address] [facility] [phone]

## 2022-08-26 NOTE — TELEPHONE ENCOUNTER
Message sent to patient via [employer] to contact the office to schedule weekly NST (next apt is 9/6)

## 2022-08-28 ENCOUNTER — NURSE TRIAGE (OUTPATIENT)
Dept: OTHER | Facility: OTHER | Age: 27
End: 2022-08-28

## 2022-08-28 NOTE — TELEPHONE ENCOUNTER
Regarding: Pregnancy problems  ----- Message from Trent Guthrie sent at 8/28/2022  7:25 PM EDT -----  "Severe headaches, vision changes, swollen ankles, 33 weeks pregnant"

## 2022-08-28 NOTE — TELEPHONE ENCOUNTER
Answer Assessment - Initial Assessment Questions  1  LOCATION: "Where does it hurt?"       Head    2  ONSET: "When did the headache start?" (Minutes, hours or days)       Since Thursday    3  PATTERN: "Does the pain come and go, or has it been constant since it started?"      Constant  4  SEVERITY: "How bad is the pain?" and "What does it keep you from doing?"     - MILD - doesn't interfere with normal activities     - MODERATE - interferes with normal activities or awakens from sleep     - SEVERE - excruciating pain, unable to do any normal activities         4/10    5  RECURRENT SYMPTOM: "Have you ever had headaches before?" If Yes, ask: "When was the last time?" and "What happened that time?"       Constant  6  CAUSE: "What do you think is causing the headache?"      Unknown  7  MIGRAINE: "Have you been diagnosed with migraine headaches?" If Yes, ask: "Is this headache similar?"       Yes    8  HEAD INJURY: "Has there been any recent injury to the head?"       No    9  OTHER SYMPTOMS: "Do you have any other symptoms?" (e g , fever, stiff neck, blurred vision; swelling of hands, face, or feet)      Blurry vision, swelling in ankles  /78    10  PREGNANCY: "How many weeks pregnant are you?"        33 wks    11  FREDY: "What date are you expecting to deliver?"        October    Protocols used: PREGNANCY - HEADACHE-ADULT-AH      Currently taking Flexeril 10 mg tab TID prn, Periactin 4 mg, mag ox, Reglan, & Riboflavin  Addressed symptoms and concerns with Dr Red Eckert  Per Dr Red Eckert- At this time for BP pt would not be given anything else to regulate BP and provider is aware that recent blood work came back normal  However if pt cannot regulate headache she is more then welcome to come back to L&D for evaluation  Pt verbalized understanding and will continue to monitor symptoms   Dr Red Eckert also offered Fioricet but pt declined stating she was told by multiple providers that it is not safe in pregnancy and she has continued to decline in the hospital as well

## 2022-08-29 ENCOUNTER — HOSPITAL ENCOUNTER (INPATIENT)
Facility: HOSPITAL | Age: 27
LOS: 2 days | Discharge: HOME | End: 2022-08-31
Attending: STUDENT IN AN ORGANIZED HEALTH CARE EDUCATION/TRAINING PROGRAM | Admitting: STUDENT IN AN ORGANIZED HEALTH CARE EDUCATION/TRAINING PROGRAM
Payer: COMMERCIAL

## 2022-08-29 ENCOUNTER — TELEPHONE (OUTPATIENT)
Dept: OBGYN CLINIC | Facility: CLINIC | Age: 27
End: 2022-08-29

## 2022-08-29 ENCOUNTER — APPOINTMENT (OUTPATIENT)
Dept: LAB | Facility: CLINIC | Age: 27
End: 2022-08-29
Payer: COMMERCIAL

## 2022-08-29 DIAGNOSIS — G93.5 CHIARI I MALFORMATION (HCC): ICD-10-CM

## 2022-08-29 DIAGNOSIS — H53.9 VISUAL DISTURBANCE: ICD-10-CM

## 2022-08-29 DIAGNOSIS — R51.9 PERSISTENT HEADACHES: ICD-10-CM

## 2022-08-29 DIAGNOSIS — R51.9 PREGNANCY HEADACHE IN THIRD TRIMESTER: Primary | ICD-10-CM

## 2022-08-29 DIAGNOSIS — Z3A.33 33 WEEKS GESTATION OF PREGNANCY: Primary | ICD-10-CM

## 2022-08-29 DIAGNOSIS — O13.3 GESTATIONAL HYPERTENSION, THIRD TRIMESTER: ICD-10-CM

## 2022-08-29 DIAGNOSIS — O26.893 PREGNANCY HEADACHE IN THIRD TRIMESTER: Primary | ICD-10-CM

## 2022-08-29 DIAGNOSIS — Z3A.31 31 WEEKS GESTATION OF PREGNANCY: ICD-10-CM

## 2022-08-29 DIAGNOSIS — Z3A.33 33 WEEKS GESTATION OF PREGNANCY: ICD-10-CM

## 2022-08-29 LAB
ALBUMIN SERPL BCP-MCNC: 2.6 G/DL (ref 3.5–5)
ALP SERPL-CCNC: 71 U/L (ref 46–116)
ALT SERPL W P-5'-P-CCNC: 17 U/L (ref 12–78)
ANION GAP SERPL CALCULATED.3IONS-SCNC: 7 MMOL/L (ref 4–13)
AST SERPL W P-5'-P-CCNC: 13 U/L (ref 5–45)
BILIRUB SERPL-MCNC: 0.28 MG/DL (ref 0.2–1)
BUN SERPL-MCNC: 5 MG/DL (ref 5–25)
CALCIUM ALBUM COR SERPL-MCNC: 10.6 MG/DL (ref 8.3–10.1)
CALCIUM SERPL-MCNC: 9.5 MG/DL (ref 8.3–10.1)
CHLORIDE SERPL-SCNC: 106 MMOL/L (ref 96–108)
CO2 SERPL-SCNC: 24 MMOL/L (ref 21–32)
CREAT SERPL-MCNC: 0.55 MG/DL (ref 0.6–1.3)
CREAT UR-MCNC: <13 MG/DL
ERYTHROCYTE [DISTWIDTH] IN BLOOD BY AUTOMATED COUNT: 12.9 % (ref 11.6–15.1)
GFR SERPL CREATININE-BSD FRML MDRD: 131 ML/MIN/1.73SQ M
GLUCOSE P FAST SERPL-MCNC: 105 MG/DL (ref 65–99)
HCT VFR BLD AUTO: 36.7 % (ref 34.8–46.1)
HGB BLD-MCNC: 12.2 G/DL (ref 11.5–15.4)
MCH RBC QN AUTO: 29.3 PG (ref 26.8–34.3)
MCHC RBC AUTO-ENTMCNC: 33.2 G/DL (ref 31.4–37.4)
MCV RBC AUTO: 88 FL (ref 82–98)
PLATELET # BLD AUTO: 264 THOUSANDS/UL (ref 149–390)
PMV BLD AUTO: 9.9 FL (ref 8.9–12.7)
POTASSIUM SERPL-SCNC: 4 MMOL/L (ref 3.5–5.3)
PROT SERPL-MCNC: 5.9 G/DL (ref 6.4–8.4)
PROT UR-MCNC: <6 MG/DL
RBC # BLD AUTO: 4.17 MILLION/UL (ref 3.81–5.12)
SODIUM SERPL-SCNC: 137 MMOL/L (ref 135–147)
WBC # BLD AUTO: 10.54 THOUSAND/UL (ref 4.31–10.16)

## 2022-08-29 PROCEDURE — 84156 ASSAY OF PROTEIN URINE: CPT | Performed by: OBSTETRICS & GYNECOLOGY

## 2022-08-29 PROCEDURE — 80053 COMPREHEN METABOLIC PANEL: CPT

## 2022-08-29 PROCEDURE — 4A1HXCZ MONITORING OF PRODUCTS OF CONCEPTION, CARDIAC RATE, EXTERNAL APPROACH: ICD-10-PCS | Performed by: STUDENT IN AN ORGANIZED HEALTH CARE EDUCATION/TRAINING PROGRAM

## 2022-08-29 PROCEDURE — 82570 ASSAY OF URINE CREATININE: CPT

## 2022-08-29 PROCEDURE — 82570 ASSAY OF URINE CREATININE: CPT | Performed by: OBSTETRICS & GYNECOLOGY

## 2022-08-29 PROCEDURE — 84156 ASSAY OF PROTEIN URINE: CPT

## 2022-08-29 PROCEDURE — 99213 OFFICE O/P EST LOW 20 MIN: CPT

## 2022-08-29 PROCEDURE — 99253 IP/OBS CNSLTJ NEW/EST LOW 45: CPT | Performed by: OBSTETRICS & GYNECOLOGY

## 2022-08-29 PROCEDURE — 99232 SBSQ HOSP IP/OBS MODERATE 35: CPT | Performed by: STUDENT IN AN ORGANIZED HEALTH CARE EDUCATION/TRAINING PROGRAM

## 2022-08-29 PROCEDURE — 36415 COLL VENOUS BLD VENIPUNCTURE: CPT

## 2022-08-29 PROCEDURE — 85027 COMPLETE CBC AUTOMATED: CPT

## 2022-08-29 PROCEDURE — NC001 PR NO CHARGE: Performed by: OBSTETRICS & GYNECOLOGY

## 2022-08-29 RX ORDER — MAGNESIUM SULFATE HEPTAHYDRATE 40 MG/ML
2 INJECTION, SOLUTION INTRAVENOUS ONCE
Status: COMPLETED | OUTPATIENT
Start: 2022-08-29 | End: 2022-08-29

## 2022-08-29 RX ORDER — CYCLOBENZAPRINE HCL 10 MG
10 TABLET ORAL 3 TIMES DAILY PRN
Status: DISCONTINUED | OUTPATIENT
Start: 2022-08-29 | End: 2022-08-29

## 2022-08-29 RX ORDER — METOCLOPRAMIDE HYDROCHLORIDE 5 MG/ML
10 INJECTION INTRAMUSCULAR; INTRAVENOUS EVERY 6 HOURS PRN
Status: DISCONTINUED | OUTPATIENT
Start: 2022-08-29 | End: 2022-08-29

## 2022-08-29 RX ORDER — CYPROHEPTADINE HYDROCHLORIDE 4 MG/1
4 TABLET ORAL 3 TIMES DAILY
Status: DISCONTINUED | OUTPATIENT
Start: 2022-08-29 | End: 2022-08-31 | Stop reason: HOSPADM

## 2022-08-29 RX ORDER — DIPHENHYDRAMINE HYDROCHLORIDE 50 MG/ML
25 INJECTION INTRAMUSCULAR; INTRAVENOUS EVERY 6 HOURS PRN
Status: DISCONTINUED | OUTPATIENT
Start: 2022-08-29 | End: 2022-08-31 | Stop reason: HOSPADM

## 2022-08-29 RX ORDER — CYCLOBENZAPRINE HCL 10 MG
10 TABLET ORAL 3 TIMES DAILY PRN
Status: DISCONTINUED | OUTPATIENT
Start: 2022-08-29 | End: 2022-08-31 | Stop reason: HOSPADM

## 2022-08-29 RX ORDER — METOCLOPRAMIDE 10 MG/1
10 TABLET ORAL EVERY 6 HOURS PRN
Status: DISCONTINUED | OUTPATIENT
Start: 2022-08-29 | End: 2022-08-31 | Stop reason: HOSPADM

## 2022-08-29 RX ORDER — ACETAMINOPHEN 325 MG/1
975 TABLET ORAL EVERY 8 HOURS PRN
Status: DISCONTINUED | OUTPATIENT
Start: 2022-08-29 | End: 2022-08-31 | Stop reason: HOSPADM

## 2022-08-29 RX ADMIN — CYPROHEPTADINE HYDROCHLORIDE 4 MG: 4 TABLET ORAL at 18:09

## 2022-08-29 RX ADMIN — DIPHENHYDRAMINE HYDROCHLORIDE 25 MG: 50 INJECTION, SOLUTION INTRAMUSCULAR; INTRAVENOUS at 15:04

## 2022-08-29 RX ADMIN — METOCLOPRAMIDE 10 MG: 5 INJECTION, SOLUTION INTRAMUSCULAR; INTRAVENOUS at 15:02

## 2022-08-29 RX ADMIN — ACETAMINOPHEN 975 MG: 325 TABLET ORAL at 16:59

## 2022-08-29 RX ADMIN — MAGNESIUM SULFATE HEPTAHYDRATE 2 G: 40 INJECTION, SOLUTION INTRAVENOUS at 18:28

## 2022-08-29 RX ADMIN — MAGNESIUM OXIDE TAB 400 MG (241.3 MG ELEMENTAL MG) 400 MG: 400 (241.3 MG) TAB at 18:09

## 2022-08-29 RX ADMIN — SODIUM CHLORIDE, SODIUM LACTATE, POTASSIUM CHLORIDE, AND CALCIUM CHLORIDE 1000 ML: .6; .31; .03; .02 INJECTION, SOLUTION INTRAVENOUS at 15:01

## 2022-08-29 RX ADMIN — CYPROHEPTADINE HYDROCHLORIDE 4 MG: 4 TABLET ORAL at 23:13

## 2022-08-29 RX ADMIN — METHYLPREDNISOLONE SODIUM SUCCINATE 500 MG: 1 INJECTION, POWDER, LYOPHILIZED, FOR SOLUTION INTRAMUSCULAR; INTRAVENOUS at 20:41

## 2022-08-29 NOTE — ASSESSMENT & PLAN NOTE
Patient reports headache is 2/10 this morning  Denies vision changes, chest pain, shortness of breath, RUQ/epigastric pain     Pree labs are WNL   Follow up 24h protein     Systolic (34PVW), SJA:779 , Min:127 , TFL:410   Diastolic (05BPF), XJB:34, Min:61, Max:72    Lab Results   Component Value Date/Time    HGB 11 5 08/31/2022 05:49 AM     08/31/2022 05:49 AM    AST 10 (L) 08/31/2022 05:49 AM    ALT 12 08/31/2022 05:49 AM    CREATININE 0 48 (L) 08/31/2022 05:49 AM

## 2022-08-29 NOTE — PLAN OF CARE
Problem: PAIN - ADULT  Goal: Verbalizes/displays adequate comfort level or baseline comfort level  Description: Interventions:  - Encourage patient to monitor pain and request assistance  - Assess pain using appropriate pain scale  - Administer analgesics based on type and severity of pain and evaluate response  - Implement non-pharmacological measures as appropriate and evaluate response  - Consider cultural and social influences on pain and pain management  - Notify physician/advanced practitioner if interventions unsuccessful or patient reports new pain  Outcome: Progressing     Problem: INFECTION - ADULT  Goal: Absence or prevention of progression during hospitalization  Description: INTERVENTIONS:  - Assess and monitor for signs and symptoms of infection  - Monitor lab/diagnostic results  - Monitor all insertion sites, i e  indwelling lines, tubes, and drains  - Monitor endotracheal if appropriate and nasal secretions for changes in amount and color  - [place] appropriate cooling/warming therapies per order  - Administer medications as ordered  - Instruct and encourage patient and family to use good hand hygiene technique  - Identify and instruct in appropriate isolation precautions for identified infection/condition  Outcome: Progressing  Goal: Absence of fever/infection during neutropenic period  Description: INTERVENTIONS:  - Monitor WBC    Outcome: Progressing     Problem: SAFETY ADULT  Goal: Patient will remain free of falls  Description: INTERVENTIONS:  - Educate patient/family on patient safety including physical limitations  - Instruct patient to call for assistance with activity   - Consult OT/PT to assist with strengthening/mobility   - Keep Call bell within reach  - Keep bed low and locked with side rails adjusted as appropriate  - Keep care items and personal belongings within reach  - Initiate and maintain comfort rounds  - Make Fall Risk Sign visible to staff  - Apply yellow socks and bracelet for high fall risk patients  - Consider moving patient to room near nurses station  Outcome: Progressing  Goal: Maintain or return to baseline ADL function  Description: INTERVENTIONS:  -  Assess patient's ability to carry out ADLs; assess patient's baseline for ADL function and identify physical deficits which impact ability to perform ADLs (bathing, care of mouth/teeth, toileting, grooming, dressing, etc )  - Assess/evaluate cause of self-care deficits   - Assess range of motion  - Assess patient's mobility; develop plan if impaired  - Assess patient's need for assistive devices and provide as appropriate  - Encourage maximum independence but intervene and supervise when necessary  - Involve family in performance of ADLs  - Assess for home care needs following discharge   - Consider OT consult to assist with ADL evaluation and planning for discharge  - Provide patient education as appropriate  Outcome: Progressing  Goal: Maintains/Returns to pre admission functional level  Description: INTERVENTIONS:  - Perform BMAT or MOVE assessment daily    - Set and communicate daily mobility goal to care team and patient/family/caregiver  - Collaborate with rehabilitation services on mobility goals if consulted  - Out of bed for toileting  - Record patient progress and toleration of activity level   Outcome: Progressing     Problem: Knowledge Deficit  Goal: Patient/family/caregiver demonstrates understanding of disease process, treatment plan, medications, and discharge instructions  Description: Complete learning assessment and assess knowledge base    Interventions:  - Provide teaching at level of understanding  - Provide teaching via preferred learning methods  Outcome: Progressing     Problem: DISCHARGE PLANNING  Goal: Discharge to home or other facility with appropriate resources  Description: INTERVENTIONS:  - Identify barriers to discharge w/patient and caregiver  - Arrange for needed discharge resources and transportation as appropriate  - Identify discharge learning needs (meds, wound care, etc )  - Arrange for interpretive services to assist at discharge as needed  - Refer to Case Management Department for coordinating discharge planning if the patient needs post-hospital services based on physician/advanced practitioner order or complex needs related to functional status, cognitive ability, or social support system  Outcome: Progressing

## 2022-08-29 NOTE — TELEPHONE ENCOUNTER
Patient walked into W office stating her symptoms are continuing and no one cares and she wants to see/speak to someone asap  This RN spoke with pt, reviewed with pt her concerns  Pt states she has been in the hospital and has been dealing with headaches and borderline high BP but feels like no one is concerned with her BP just headaches, which pt states have been ongoing  Pt states she received treatment and workup for headaches and just keeps being told to continue her medications  Pt states she is not taking any BP medications and her BP taken yesterday 8/28 at 1830 was 142/78, bp was not taken today per pt  Pt states she is also concerned that she has been noticing b/l le ankle swelling and she has not been able to sleep well  Pt states last week after being discharged from hospital she was experiencing some blurry vision on Thursday and then on Saturday she had a few minutes where she lost her vision completely  Pt states she called and was told to take her medications and monitor and she feels like no one cared about her symptoms/concerns  Pt states she was told by M if she experienced any visual changes that was a concern but then pt feels like she is getting mixed recommendations because when she calls or goes to the hospital no one cares  Pt is very tearful during conversation and states she is just very concerned for herself and for her baby  Discussed with pt, per previous documentation noted of most recent call pt was advised to ED for further evaluation and was offered medication the pt refused at that time  Pt states she felt like if she went to ED she was going against the provider's recommendations and she doesn't want to just keep going back and forth to the hospital  Pt made aware if pt has concerns can call to review and can proceed to ED for further evaluation as appropriate   Pt states she is still extremely concerned with her ongoing symptoms and wants to be evaluated and have further workup done  Pt reports + fm, denies any cramping/pain/spotting/bleeding  Pt states her fiance is waiting in the car and is able to drive her to [facility] ED  Pt states she will go to ED for further evaluation at this time  TT sent to on call provider to make aware

## 2022-08-29 NOTE — H&P
[place]renton [age] y o  female MRN: [id number]  Unit/Bed#: -01 Encounter: [id number]      Assessment: Leonel Hodgkins 27 y o  Alejandra Childdorinda at 33w2d admitted for intractable headache with new onset vision changes in the setting of gestational hypertension, need to rule out preeclampsia  She is a patient of [facility]      Plan:     Persistent headaches  Assessment & Plan  Continue home medications   MFM consultation     Gestational hypertension, third trimester  Assessment & Plan  Preeclampsia labs WNL   Need to rule out evolving preeclampsia in the setting of vision changes   MFM consultation     * 33 weeks gestation of pregnancy  Assessment & Plan  NST TID   S/p BTM 8/20-8/21  NICU Consultation   Normal fetal growth 8/21  NST TID           SUBJECTIVE:    Chief Complaint: headache    HPI: Leonel Hodgkins is a 27 y o  Alejandra Childes with an FREDY of 10/15/2022, by Last Menstrual Period at 33w2d who is being admitted for observation in the setting of intractable headache with new onset visual changes  Patient was recently admitted to antepartum service for intractable headache  Reports headache has not improved and last Thursday she started to have vision changes that persisted through the weekend  She now reports a right temporal headache with blurred vision  She has been taking tylenol, reglan, oral magnesium, cyprohepatidine, and flexeril  Has not taken the cyproheptadine and flexeril today  She denies any other obstetric complaints  Pregnancy Complications/Comments:   Gestational hypertension   Chiari 1 malformation   BMI 37  History of migraines     Baby complications/comments: none    Review of Systems   Constitutional: Negative for chills and fever  Eyes: Positive for visual disturbance  Negative for photophobia  Respiratory: Negative for shortness of breath  Cardiovascular: Negative for chest pain and palpitations     Gastrointestinal: Negative for abdominal pain, nausea and vomiting  Genitourinary: Negative for dysuria, vaginal bleeding, vaginal discharge and vaginal pain  Neurological: Positive for headaches  Negative for facial asymmetry, speech difficulty, weakness and numbness  OB History    Para Term  AB Living   3       2     SAB IAB Ectopic Multiple Live Births   2              # Outcome Date GA Lbr Sadiq/2nd Weight Sex Delivery Anes PTL Lv   3 Current            2 2019           1 2017               Past Surgical History:   Procedure Laterality Date    TONSILLECTOMY      WISDOM TOOTH EXTRACTION         Social History     Tobacco Use    Smoking status: Never Smoker    Smokeless tobacco: Never Used   Substance Use Topics    Alcohol use: Not Currently       Allergies   Allergen Reactions    Latex Hives and Rash    Adhesive [Medical Tape] Rash     Redness, rash, possible Latex allergy as well       Medications Prior to Admission   Medication    acetaminophen (TYLENOL) 325 mg tablet    cyclobenzaprine (FLEXERIL) 10 mg tablet    cyproheptadine (PERIACTIN) 4 mg tablet    magnesium oxide (MAG-OX) 400 mg    metoclopramide (Reglan) 10 mg tablet    Prenatal Vit-Fe Fumarate-FA (PRENATAL VITAMIN PO)    Riboflavin 400 MG CAPS           OBJECTIVE:  Vitals:  Temp:  [98 3 °F (36 8 °C)-98 4 °F (36 9 °C)] 98 4 °F (36 9 °C)  HR:  [] 87  Resp:  [18-20] 18  BP: (128-141)/(69-78) 141/71  Body mass index is 37 12 kg/m²  Physical Exam:    General Appearance: Awake, alert and not in acute distress     CV: RRR   Pulm: Lungs CTA, bilaterally   GI: Gravid Abdomen, Soft, non-tender to palpation in all four quadrants  MSK: Moves upper and lower extremities without difficulty   Lower Extremities: minimal lower extremity edema   Neuro: cranial nerves II-XII intact, sensation intact in face, UE/LE bilaterally, strength +5 in UE/LE bilaterally, reflexes +2 in UE/LE without ankle clonus       FHT:  Baseline Rate: 145 bpm  Variability: Moderate 6-25 bpm  Accelerations: 15 x 15 or greater  Decelerations: None  FHR Category: Category I    TOCO:   Contraction Frequency (minutes): none  Contraction Quality: Not applicable    Prenatal Labs: Reviewed      Blood type: A+  Antibody: negative  Group B strep: negative  HIV: negative  Hepatitis B: negative  RPR: non-reactive  Rubella: Immune  1 hour Glucose: 105  Platelets: 308  Hgb: 11 6      Dr Khanh Oliva  aware    >2 Midnights  Allie Vides MD  OB/GYN PGY-3  8/29/2022  6:18 PM

## 2022-08-29 NOTE — ASSESSMENT & PLAN NOTE
S/p BTM 8/20-8/21  Normal fetal growth on 8/21  NST is reactive - continue TID   S/p NICU consultation

## 2022-08-29 NOTE — CONSULTS
Consultation - Maternal Fetal Medicine   Jeni Flores 27 y o  female MRN: [id number]  Unit/Bed#: -01 Encounter: [id number]  Admit date: 2022  Today's date: 22    Assessment/Plan   Ms Vicki Munroe is a 27y o  year-old  at [address] [place] [address] Day: 1, admitted for intractable headache with new onset vision changes in the setting of gestational hypertension and need to rule out evolving preeclampsia  By issue:    Persistent headaches  Assessment & Plan  Continue tylenol, reglan, oral magnesium, cyproheptadine and flexeril  Negative head imaging from   Neurologic exam is benign without focal findings   S/p IVF, reglan, benadryl   Plan for solumederol and magnesium infusion as headache is persisting   Consider opthalmology consultation if visual symptoms persist     Gestational hypertension, third trimester  Assessment & Plan  Admitted to antepartum service last week with concern intractable headache and ultimately discharged  Most recent preeclampsia labs drawn this morning were negative   Now noted to have persistent headache with new onset vision changes   Discussed with patient and her partner the diagnosis of preeclampsia- signs and symptoms we observe and blood pressure monitoring  Symptoms we observe are headaches that are persistent, vision changes, chest pain, shortness of breath and RUQ/epigastric pain  At this time, she does have a persistent headache with new onset visual changes that were not there previously  She also denies a history of visual changes with her history of migraines  This new symptom is concerning for evolving preeclampsia with severe features and warrants further and closer monitoring  Her blood pressures have been non-severe, labs are stable and she continues to not have proteinuria  However, things can change acutely with preeclampsia and her symptoms necessitate monitoring   We did discuss if this is severe preeclampsia that delivery would be at 34 weeks  She has received a full course of BTM and NICU is on board to talk with her and her partner  We also discussed treatment for severe preeclampsia including magnesium sulfate infusion to prevent seizures  Plan to continue to monitor symptoms and blood pressures   Labs q day   Plan to repeat tomorrow morning unless symptoms change or blood pressure changes overnight       * 33 weeks gestation of pregnancy  Assessment & Plan  S/p BTM -  Normal fetal growth on   NST is reactive - continue TID   NICU consultation placed            Chief Complaint   Patient presents with    Headache    Leg Swelling       Physician Requesting Consult: Carol Dominguez MD  Reason for Consult / Principal Problem: intractable headache with vision changes  Subspeciality: Perinatology    Dear Dr Elizabeth Hwang,    Thank you for referring patient Poncho Pena for Maternal-Fetal Medicine consultation regarding new onset vision changes in the setting of intractable headache  HPI: As you know, Ms Rosa Michelle is a 27y o  year-old  with an FREDY of Estimated Date of Delivery: 10/15/22 at [address] [place] [address] Day: 1, admitted for HA with vision changes in setting of gestational hypertension  Her current pregnancy is significant for gestational hypertension and chiari malformation  Her obstetrical history is significant for 2 SAB  Patient was recently admitted to the [facility] service due to intractable headache in the setting of gestational hypertension  During that admission, she had negative preeclampsia labs and negative head imaging  She received a full course of BTM  She had consultations with neurology and was started on cyproheptadine, flexeril, tylenol and magnesium  She reports a history of migraines without aura  She was discharged with close outpatient follow up  Patient states that on Thursday night she had noticed floaters in her vision   She then notes transient vision loss in both eyes on Saturday for 2 minutes that resolved spontaneously  Her headache has now become right temporal and she has right blurry vision  She was sent for preeclampsia labs this morning which were WNL  She took tylenol, Niacin and reglan this morning without resolution of symptoms  Patient does have a history of migraines, but denies a history of visual disturbances associated with the migraines  She denies decrease in sensation, weakness, facial asymmetry, numbness  Other obstetric review of symptoms:  Contractions: None  Leakage of fluid: None  Bleeding: None  Fetal movement: present  Pertinent items are noted in HPI  Review of Systems   Constitutional: Negative for chills and fever  Eyes: Positive for visual disturbance  Negative for photophobia  Respiratory: Negative for shortness of breath  Cardiovascular: Negative for chest pain and palpitations  Gastrointestinal: Negative for abdominal pain, nausea and vomiting  Genitourinary: Negative for dysuria, vaginal bleeding, vaginal discharge and vaginal pain  Musculoskeletal: Negative  Neurological: Positive for headaches  Negative for dizziness, tremors, speech difficulty, weakness and light-headedness  Other history is as follows:    Historical Information   OB History    Para Term  AB Living   3       2     SAB IAB Ectopic Multiple Live Births   2              # Outcome Date GA Lbr Sadiq/2nd Weight Sex Delivery Anes PTL Lv   3 Current            2 2019           1 2017             Gynecologic history: Patient's last menstrual period was 2022       Past Medical History:   Diagnosis Date    Migraine      Past Surgical History:   Procedure Laterality Date    TONSILLECTOMY      WISDOM TOOTH EXTRACTION       Social History   Social History     Substance and Sexual Activity   Alcohol Use Not Currently     Social History     Substance and Sexual Activity   Drug Use Never     Social History     Tobacco Use   Smoking Status Never Smoker   Smokeless Tobacco Never Used     Family History: non-contributory    Meds/Allergies   Prior to Admission Medications   Prescriptions Last Dose Informant Patient Reported? Taking?    Prenatal Vit-Fe Fumarate-FA (PRENATAL VITAMIN PO) 8/29/2022 at 0700  Yes Yes   Sig: Take by mouth   Riboflavin 400 MG CAPS 8/28/2022 at Unknown time  No Yes   Sig: Take 1 capsule (400 mg total) by mouth daily   acetaminophen (TYLENOL) 325 mg tablet 8/29/2022 at 0700  No Yes   Sig: Take 3 tablets (975 mg total) by mouth every 8 (eight) hours as needed for mild pain, headaches or fever   cyclobenzaprine (FLEXERIL) 10 mg tablet 8/28/2022 at Unknown time  No Yes   Sig: Take 1 tablet (10 mg total) by mouth 3 (three) times a day as needed for muscle spasms (headache)   cyproheptadine (PERIACTIN) 4 mg tablet 8/28/2022 at Unknown time  No Yes   Sig: Take 1 tablet (4 mg total) by mouth 3 (three) times a day   magnesium oxide (MAG-OX) 400 mg 8/29/2022 at 0700  No Yes   Sig: Take 1 tablet (400 mg total) by mouth 2 (two) times a day   metoclopramide (Reglan) 10 mg tablet 8/28/2022 at Unknown time  No Yes   Sig: Take 1 tablet (10 mg total) by mouth every 6 (six) hours as needed (headache)      Facility-Administered Medications: None     Medication Administration - last 24 hours from 08/28/2022 1838 to 08/29/2022 0596       Date/Time Order Dose Route Action Action by     08/29/2022 1615 lactated ringers bolus 1,000 mL 0 mL Intravenous Crispin [employer], RN     08/29/2022 1501 lactated ringers bolus 1,000 mL 1,000 mL Intravenous Jennifer 37 [place]     08/29/2022 1502 metoclopramide (REGLAN) injection 10 mg 10 mg Intravenous Given Joesph Fan RN     08/29/2022 1504 diphenhydrAMINE (BENADRYL) injection 25 mg 25 mg Intravenous Given Joesph Fan RN     08/29/2022 1659 acetaminophen (TYLENOL) tablet 975 mg 975 mg Oral Given rA Navarro RN     08/29/2022 2788 cyproheptadine (PERIACTIN) tablet 4 mg 4 mg Oral Given Shi Amador RN     08/29/2022 1809 magnesium oxide (MAG-OX) tablet 400 mg 400 mg Oral Given Ar Burroughs RN     08/29/2022 1828 magnesium sulfate 2 g/50 mL IVPB (premix) 2 g 2 g Intravenous New Bag Ar Burroughs, JW        Current Facility-Administered Medications   Medication Dose Route Frequency    acetaminophen (TYLENOL) tablet 975 mg  975 mg Oral Q8H PRN    cyclobenzaprine (FLEXERIL) tablet 10 mg  10 mg Oral TID PRN    cyproheptadine (PERIACTIN) tablet 4 mg  4 mg Oral TID    diphenhydrAMINE (BENADRYL) injection 25 mg  25 mg Intravenous Q6H PRN    magnesium oxide (MAG-OX) tablet 400 mg  400 mg Oral BID    magnesium sulfate 2 g/50 mL IVPB (premix) 2 g  2 g Intravenous Once    methylPREDNISolone sodium succinate (Solu-MEDROL) 500 mg in sodium chloride 0 9 % 250 mL IVPB  500 mg Intravenous Once    metoclopramide (REGLAN) tablet 10 mg  10 mg Oral Q6H PRN     Allergies   Allergen Reactions    Latex Hives and Rash    Adhesive [Medical Tape] Rash     Redness, rash, possible Latex allergy as well       Objective    Patient Vitals for the past 24 hrs:   BP Temp Temp src Pulse Resp SpO2 Height Weight   08/29/22 1635 141/71 98 4 °F (36 9 °C) Oral 87 18 98 % -- --   08/29/22 1500 132/74 -- -- -- -- -- 5' 6" (1 676 m) 104 kg (230 lb)   08/29/22 1433 135/75 -- -- (!) 108 -- -- -- --   08/29/22 1418 128/69 -- -- (!) 106 -- -- -- --   08/29/22 1403 136/78 -- -- (!) 115 -- -- -- --   08/29/22 1348 134/70 -- -- (!) 107 20 -- -- --   08/29/22 1334 -- 98 3 °F (36 8 °C) Oral -- -- -- -- --     Vitals: Blood pressure 141/71, pulse 87, temperature 98 4 °F (36 9 °C), temperature source Oral, resp  rate 18, height 5' 6" (1 676 m), weight 104 kg (230 lb), last menstrual period 01/08/2022, SpO2 98 %  Body mass index is 37 12 kg/m²  Physical Exam  Eyes:      General: No visual field deficit  Cardiovascular:      Rate and Rhythm: Normal rate and regular rhythm     Pulmonary: Effort: Pulmonary effort is normal       Breath sounds: No wheezing or rales  Chest:      Chest wall: No tenderness  Abdominal:      Palpations: Abdomen is soft  Tenderness: There is no abdominal tenderness  There is no guarding or rebound  Comments: gravid   Musculoskeletal:      Cervical back: Normal range of motion  Comments: +1 edema    Neurological:      Mental Status: She is alert and oriented to person, place, and time  Cranial Nerves: Cranial nerves are intact  No dysarthria or facial asymmetry  Sensory: Sensation is intact  Motor: Motor function is intact  No weakness  Deep Tendon Reflexes:      Reflex Scores:       Bicep reflexes are 2+ on the right side and 2+ on the left side  Brachioradialis reflexes are 2+ on the right side and 2+ on the left side  Patellar reflexes are 2+ on the right side and 2+ on the left side  Not evaluated      Prenatal Labs:   Blood Type: No results found for: ABO  , D (Rh type): No results found for: RH  , Antibody Screen: No results found for: ANTIBODYSCR , HCT/HGB:   Lab Results   Component Value Date    HCT 36 7 08/29/2022    HGB 12 2 08/29/2022      , Platelets: No results found for: PLATELETS   , 1 hour Glucola: No results found for: GLUCOLA, Rubella: No results found for: RUBELLAIGGQT     , VDRL/RPR: No results found for: RPR   , Hep B: No results found for: HEPBSAG  , HIV: No results found for: HIVAGAB  , Group B Strep:  No results found for: STREPGRPB       Results from last 7 days   Lab Units 08/29/22  0738 08/24/22  0505   WBC Thousand/uL 10 54* 14 04*   HEMOGLOBIN g/dL 12 2 11 6   MCV fL 88 89   PLATELETS Thousands/uL 264 293         Results from last 7 days   Lab Units 08/29/22  0738 08/24/22  0505   POTASSIUM mmol/L 4 0 3 9   CHLORIDE mmol/L 106 107   CO2 mmol/L 24 21   BUN mg/dL 5 8   CREATININE mg/dL 0 55* 0 53*   EGFR ml/min/1 73sq m 131 133     Results from last 7 days   Lab Units 08/29/22  1964 08/24/22  0505   AST U/L 13 9*   ALT U/L 17 10   ALK PHOS U/L 71 60     Results from last 7 days   Lab Units 08/29/22  0738 08/24/22  0505   PLATELETS Thousands/uL 264 293                 Results from last 7 days   Lab Units 08/24/22  1455   PROTEIN 24H UR mg/24 hrs 266*           Fetal data:  Nonstress test: date 08/29/22 Time: 1400   Baseline: 140  Variability: moderate  Accelerations: present, 15x15  Decelerations: absent  Contractions: absent  Assessment: reactive  Plan: continue TID and PRN    MFM ultrasound report key findings:  from 8/21/22 date at 32w1d gestational age  AMNIOTIC FLUID     Q1: 3 9      Q2: 1 9      Q3: 1 3      Q4:  CARA Total = 7 1 cm  Amniotic Fluid: Normal     MEASUREMENTS (* Included In Average GA)     AC              26 5 cm        30 weeks 4 days* (11%)  BPD              8 6 cm        34 weeks 4 days* (95%)  HC              29 2 cm        32 weeks 1 day * (17%)  Femur            6 6 cm        33 weeks 6 days* (81%)     HC/AC           1 10 [0 96 - 1 17]                 (76%)  FL/AC             25 [20 - 24]  FL/BPD            77 [71 - 87]  EFW Hadlock 4   1903 grams - 4 lbs 3 oz                 (43%)     THE AVERAGE GESTATIONAL AGE is 32 weeks 6 days +/- 21 days      IMPRESSION     Dubose IUP  32 weeks and 6 days by this ultrasound  (FREDY=OCT 10 2022)  32 weeks and 1 day by LMP  (FREDY=OCT 15 2022)  Vertex presentation  Fetal growth appeared normal  Regular fetal heart rate of 126 bpm  Posterior placenta      Imaging, EKG, Pathology, and Other Studies: I have personally reviewed pertinent reports              Waldemar Tracy MD  8/29/2022  6:38 PM

## 2022-08-29 NOTE — ASSESSMENT & PLAN NOTE
Continue tylenol, reglan, oral magnesium, cyproheptadine and flexeril  Negative head imaging from 8/21  Neurologic exam is benign without focal findings   S/p IVF, reglan, benadryl and HA cocktail   Headache improving now 2/10   Vision disturbances absent this morning   S/p Opthalmology consult- normal examination

## 2022-08-29 NOTE — ASSESSMENT & PLAN NOTE
Preeclampsia labs WNL   Need to rule out evolving preeclampsia in the setting of vision changes   MFM consultation

## 2022-08-30 LAB
ABO GROUP BLD: NORMAL
ALBUMIN SERPL BCP-MCNC: 3.3 G/DL (ref 3.5–5)
ALP SERPL-CCNC: 67 U/L (ref 34–104)
ALT SERPL W P-5'-P-CCNC: 13 U/L (ref 7–52)
ANION GAP SERPL CALCULATED.3IONS-SCNC: 6 MMOL/L (ref 4–13)
AST SERPL W P-5'-P-CCNC: 10 U/L (ref 13–39)
BILIRUB SERPL-MCNC: 0.33 MG/DL (ref 0.2–1)
BLD GP AB SCN SERPL QL: NEGATIVE
BUN SERPL-MCNC: 7 MG/DL (ref 5–25)
CALCIUM ALBUM COR SERPL-MCNC: 10.1 MG/DL (ref 8.3–10.1)
CALCIUM SERPL-MCNC: 9.5 MG/DL (ref 8.4–10.2)
CHLORIDE SERPL-SCNC: 106 MMOL/L (ref 96–108)
CO2 SERPL-SCNC: 22 MMOL/L (ref 21–32)
CREAT SERPL-MCNC: 0.48 MG/DL (ref 0.6–1.3)
CREAT UR-MCNC: 26.8 MG/DL
ERYTHROCYTE [DISTWIDTH] IN BLOOD BY AUTOMATED COUNT: 12.9 % (ref 11.6–15.1)
GFR SERPL CREATININE-BSD FRML MDRD: 137 ML/MIN/1.73SQ M
GLUCOSE SERPL-MCNC: 157 MG/DL (ref 65–140)
HCT VFR BLD AUTO: 35.7 % (ref 34.8–46.1)
HGB BLD-MCNC: 12 G/DL (ref 11.5–15.4)
MCH RBC QN AUTO: 29.1 PG (ref 26.8–34.3)
MCHC RBC AUTO-ENTMCNC: 33.6 G/DL (ref 31.4–37.4)
MCV RBC AUTO: 86 FL (ref 82–98)
PLATELET # BLD AUTO: 265 THOUSANDS/UL (ref 149–390)
PMV BLD AUTO: 10 FL (ref 8.9–12.7)
POTASSIUM SERPL-SCNC: 4.3 MMOL/L (ref 3.5–5.3)
PROT SERPL-MCNC: 5.9 G/DL (ref 6.4–8.4)
PROT UR-MCNC: 4 MG/DL
PROT/CREAT UR: 0.15 MG/G{CREAT} (ref 0–0.1)
RBC # BLD AUTO: 4.13 MILLION/UL (ref 3.81–5.12)
RH BLD: POSITIVE
RPR SER QL: NORMAL
SODIUM SERPL-SCNC: 134 MMOL/L (ref 135–147)
SPECIMEN EXPIRATION DATE: NORMAL
WBC # BLD AUTO: 12.64 THOUSAND/UL (ref 4.31–10.16)

## 2022-08-30 PROCEDURE — 86850 RBC ANTIBODY SCREEN: CPT | Performed by: STUDENT IN AN ORGANIZED HEALTH CARE EDUCATION/TRAINING PROGRAM

## 2022-08-30 PROCEDURE — 99232 SBSQ HOSP IP/OBS MODERATE 35: CPT | Performed by: OBSTETRICS & GYNECOLOGY

## 2022-08-30 PROCEDURE — 85027 COMPLETE CBC AUTOMATED: CPT | Performed by: OBSTETRICS & GYNECOLOGY

## 2022-08-30 PROCEDURE — 99232 SBSQ HOSP IP/OBS MODERATE 35: CPT | Performed by: STUDENT IN AN ORGANIZED HEALTH CARE EDUCATION/TRAINING PROGRAM

## 2022-08-30 PROCEDURE — 86901 BLOOD TYPING SEROLOGIC RH(D): CPT | Performed by: STUDENT IN AN ORGANIZED HEALTH CARE EDUCATION/TRAINING PROGRAM

## 2022-08-30 PROCEDURE — 86592 SYPHILIS TEST NON-TREP QUAL: CPT | Performed by: STUDENT IN AN ORGANIZED HEALTH CARE EDUCATION/TRAINING PROGRAM

## 2022-08-30 PROCEDURE — NC001 PR NO CHARGE: Performed by: OBSTETRICS & GYNECOLOGY

## 2022-08-30 PROCEDURE — 86900 BLOOD TYPING SEROLOGIC ABO: CPT | Performed by: STUDENT IN AN ORGANIZED HEALTH CARE EDUCATION/TRAINING PROGRAM

## 2022-08-30 PROCEDURE — 80053 COMPREHEN METABOLIC PANEL: CPT | Performed by: OBSTETRICS & GYNECOLOGY

## 2022-08-30 RX ADMIN — MAGNESIUM OXIDE TAB 400 MG (241.3 MG ELEMENTAL MG) 400 MG: 400 (241.3 MG) TAB at 19:01

## 2022-08-30 RX ADMIN — ACETAMINOPHEN 975 MG: 325 TABLET ORAL at 06:17

## 2022-08-30 RX ADMIN — CYPROHEPTADINE HYDROCHLORIDE 4 MG: 4 TABLET ORAL at 21:18

## 2022-08-30 RX ADMIN — CYPROHEPTADINE HYDROCHLORIDE 4 MG: 4 TABLET ORAL at 16:15

## 2022-08-30 RX ADMIN — CYPROHEPTADINE HYDROCHLORIDE 4 MG: 4 TABLET ORAL at 08:51

## 2022-08-30 RX ADMIN — ACETAMINOPHEN 975 MG: 325 TABLET ORAL at 14:50

## 2022-08-30 RX ADMIN — CYCLOBENZAPRINE HYDROCHLORIDE 10 MG: 10 TABLET, FILM COATED ORAL at 22:07

## 2022-08-30 RX ADMIN — MAGNESIUM OXIDE TAB 400 MG (241.3 MG ELEMENTAL MG) 400 MG: 400 (241.3 MG) TAB at 08:51

## 2022-08-30 NOTE — UTILIZATION REVIEW
Inpatient Admission Authorization Request   NOTIFICATION OF INPATIENT ADMISSION/INPATIENT AUTHORIZATION REQUEST   SERVICING FACILITY:   [address]  Tax ID: [id number]  NPI: [id number]  Place of Service: [facility]patient [facility]  [facility]  Place of Service Code: 24     ATTENDING PROVIDER:  Attending Name and NPI#: Isabella Azul Md [[id number]]  Address: Nikkie Carty  [place], [address] [place] [address]  Phone: [phone]     UTILIZATION REVIEW CONTACT:  Navin Frost Utilization Review Supervisor  Network Utilization Review Department  Phone: [phone]  Fax: [phone]  Email: [email or web address]     PHYSICIAN ADVISORY SERVICES:  FOR SVNI-GA-[facility]NA REVIEW - MEDICAL NECESSITY DENIAL  Phone: [phone]  Fax: [phone]  Email: [email or web address]     TYPE OF REQUEST:  Inpatient Status     ADMISSION INFORMATION:  ADMISSION DATE/TIME: 8/29/22  4:08 PM  PATIENT DIAGNOSIS CODE/DESCRIPTION:  Preeclampsia, unspecified trimester [O14 90]  33 weeks gestation of pregnancy [Z3A 33]  DISCHARGE DATE/TIME: No discharge date for patient encounter  IMPORTANT INFORMATION:  Please contact Navin Frost directly with any questions or concerns regarding this request  Department voicemails are confidential     Send requests for admission clinical reviews, concurrent reviews, approvals, and administrative denials due to lack of clinical to fax [phone]

## 2022-08-30 NOTE — PLAN OF CARE
Problem: PAIN - ADULT  Goal: Verbalizes/displays adequate comfort level or baseline comfort level  Description: Interventions:  - Encourage patient to monitor pain and request assistance  - Assess pain using appropriate pain scale  - Administer analgesics based on type and severity of pain and evaluate response  - Implement non-pharmacological measures as appropriate and evaluate response  - Consider cultural and social influences on pain and pain management  - Notify physician/advanced practitioner if interventions unsuccessful or patient reports new pain  Outcome: Progressing     Problem: INFECTION - ADULT  Goal: Absence or prevention of progression during hospitalization  Description: INTERVENTIONS:  - Assess and monitor for signs and symptoms of infection  - Monitor lab/diagnostic results  - Monitor all insertion sites, i e  indwelling lines, tubes, and drains  - Monitor endotracheal if appropriate and nasal secretions for changes in amount and color  - [place] appropriate cooling/warming therapies per order  - Administer medications as ordered  - Instruct and encourage patient and family to use good hand hygiene technique  - Identify and instruct in appropriate isolation precautions for identified infection/condition  Outcome: Progressing  Goal: Absence of fever/infection during neutropenic period  Description: INTERVENTIONS:  - Monitor WBC    Outcome: Progressing     Problem: SAFETY ADULT  Goal: Patient will remain free of falls  Description: INTERVENTIONS:  - Educate patient/family on patient safety including physical limitations  - Instruct patient to call for assistance with activity   - Consult OT/PT to assist with strengthening/mobility   - Keep Call bell within reach  - Keep bed low and locked with side rails adjusted as appropriate  - Keep care items and personal belongings within reach  - Initiate and maintain comfort rounds  - Make Fall Risk Sign visible to staff  - Offer Toileting in advance of need  - Obtain necessary fall risk management equipment  - Apply yellow socks and bracelet for high fall risk patients  - Consider moving patient to room near nurses station  Outcome: Progressing  Goal: Maintain or return to baseline ADL function  Description: INTERVENTIONS:  -  Assess patient's ability to carry out ADLs; assess patient's baseline for ADL function and identify physical deficits which impact ability to perform ADLs (bathing, care of mouth/teeth, toileting, grooming, dressing, etc )  - Assess/evaluate cause of self-care deficits   - Assess range of motion  - Assess patient's mobility; develop plan if impaired  - Assess patient's need for assistive devices and provide as appropriate  - Encourage maximum independence but intervene and supervise when necessary  - Involve family in performance of ADLs  - Assess for home care needs following discharge   - Consider OT consult to assist with ADL evaluation and planning for discharge  - Provide patient education as appropriate  Outcome: Progressing  Goal: Maintains/Returns to pre admission functional level  Description: INTERVENTIONS:  - Perform BMAT or MOVE assessment daily    - Set and communicate daily mobility goal to care team and patient/family/caregiver  - Collaborate with rehabilitation services on mobility goals if consulted  - Record patient progress and toleration of activity level   Outcome: Progressing     Problem: Knowledge Deficit  Goal: Patient/family/caregiver demonstrates understanding of disease process, treatment plan, medications, and discharge instructions  Description: Complete learning assessment and assess knowledge base    Interventions:  - Provide teaching at level of understanding  - Provide teaching via preferred learning methods  Outcome: Progressing     Problem: DISCHARGE PLANNING  Goal: Discharge to home or other facility with appropriate resources  Description: INTERVENTIONS:  - Identify barriers to discharge w/patient and caregiver  - Arrange for needed discharge resources and transportation as appropriate  - Identify discharge learning needs (meds, wound care, etc )  - Arrange for interpretive services to assist at discharge as needed  - Refer to Case Management Department for coordinating discharge planning if the patient needs post-hospital services based on physician/advanced practitioner order or complex needs related to functional status, cognitive ability, or social support system  Outcome: Progressing     Problem: ANTEPARTUM  Goal: Maintain pregnancy as long as maternal and/or fetal condition is stable  Description: INTERVENTIONS:  - Maternal surveillance  - Fetal surveillance  - Monitor uterine activity  - Medications as ordered  - Bedrest  Outcome: Progressing

## 2022-08-30 NOTE — PLAN OF CARE
Problem: PAIN - ADULT  Goal: Verbalizes/displays adequate comfort level or baseline comfort level  Description: Interventions:  - Encourage patient to monitor pain and request assistance  - Assess pain using appropriate pain scale  - Administer analgesics based on type and severity of pain and evaluate response  - Implement non-pharmacological measures as appropriate and evaluate response  - Consider cultural and social influences on pain and pain management  - Notify physician/advanced practitioner if interventions unsuccessful or patient reports new pain  Outcome: Progressing     Problem: INFECTION - ADULT  Goal: Absence or prevention of progression during hospitalization  Description: INTERVENTIONS:  - Assess and monitor for signs and symptoms of infection  - Monitor lab/diagnostic results  - Monitor all insertion sites, i e  indwelling lines, tubes, and drains  - Monitor endotracheal if appropriate and nasal secretions for changes in amount and color  - Anthony appropriate cooling/warming therapies per order  - Administer medications as ordered  - Instruct and encourage patient and family to use good hand hygiene technique  - Identify and instruct in appropriate isolation precautions for identified infection/condition  Outcome: Progressing  Goal: Absence of fever/infection during neutropenic period  Description: INTERVENTIONS:  - Monitor WBC    Outcome: Progressing     Problem: SAFETY ADULT  Goal: Patient will remain free of falls  Description: INTERVENTIONS:  - Educate patient/family on patient safety including physical limitations  - Instruct patient to call for assistance with activity   - Consult OT/PT to assist with strengthening/mobility   - Keep Call bell within reach  - Keep bed low and locked with side rails adjusted as appropriate  - Keep care items and personal belongings within reach  - Initiate and maintain comfort rounds  - Make Fall Risk Sign visible to staff  - Offer Toileting in advance of need  - Obtain necessary fall risk management equipment  - Apply yellow socks and bracelet for high fall risk patients  - Consider moving patient to room near nurses station  Outcome: Progressing  Goal: Maintain or return to baseline ADL function  Description: INTERVENTIONS:  -  Assess patient's ability to carry out ADLs; assess patient's baseline for ADL function and identify physical deficits which impact ability to perform ADLs (bathing, care of mouth/teeth, toileting, grooming, dressing, etc )  - Assess/evaluate cause of self-care deficits   - Assess range of motion  - Assess patient's mobility; develop plan if impaired  - Assess patient's need for assistive devices and provide as appropriate  - Encourage maximum independence but intervene and supervise when necessary  - Involve family in performance of ADLs  - Assess for home care needs following discharge   - Consider OT consult to assist with ADL evaluation and planning for discharge  - Provide patient education as appropriate  Outcome: Progressing  Goal: Maintains/Returns to pre admission functional level  Description: INTERVENTIONS:  - Perform BMAT or MOVE assessment daily    - Set and communicate daily mobility goal to care team and patient/family/caregiver  - Collaborate with rehabilitation services on mobility goals if consulted  - Record patient progress and toleration of activity level   Outcome: Progressing     Problem: Knowledge Deficit  Goal: Patient/family/caregiver demonstrates understanding of disease process, treatment plan, medications, and discharge instructions  Description: Complete learning assessment and assess knowledge base    Interventions:  - Provide teaching at level of understanding  - Provide teaching via preferred learning methods  Outcome: Progressing     Problem: DISCHARGE PLANNING  Goal: Discharge to home or other facility with appropriate resources  Description: INTERVENTIONS:  - Identify barriers to discharge w/patient and caregiver  - Arrange for needed discharge resources and transportation as appropriate  - Identify discharge learning needs (meds, wound care, etc )  - Arrange for interpretive services to assist at discharge as needed  - Refer to Case Management Department for coordinating discharge planning if the patient needs post-hospital services based on physician/advanced practitioner order or complex needs related to functional status, cognitive ability, or social support system  Outcome: Progressing

## 2022-08-30 NOTE — CONSULTS
This 82-year-old woman is 33 weeks  P2 presents complaining of headache  She was recently admitted for headaches and a workup was unrewarding  Subsequently her headaches returned and she also had visual symptoms  She reports that she  saw silver floaters in both eyes for 5 minutes, a complete by a headache  Subsequently she reports that she had a tunnel vision leading to total black out in both eyes lasting 1 minute  She had an 8/10 headache at the time, initially starting at the left temporal region and then changing to the right temporal region  Her vision both times return to normal   Her headache at the moment is for/10  Past ocular history is unremarkable  Past medical history is unremarkable  Imaging, lab work and Neurology evaluation have been unremarkable  There is concern for preeclampsia  On examination, visual acuity with correction at near is 2020 both eyes  Pupils are equal round reactive to light with no afferent defect  Extraocular movements are unrestricted  Confrontation visual fields are full both eyes  The external examination is unremarkable  Finger tensions are soft both eyes  Nondilated fundus could be reveals pink flat optic nerves with 0 1 cupping noted in both eyes  The macula, vessels and mid periphery are unremarkable both eyes  Impression:  Headaches  Eye exam unremarkable  Of note, there is no papilledema and no findings consistent with preeclampsia  Plan:  Observation  Please re-consult if symptoms recur or change in character

## 2022-08-30 NOTE — PROGRESS NOTES
James aVlerio 27 y o  [id number] 33w3d admitted with intractable headache    New visual disturbances from this weekend have resolved as of this AM  Headache currently right-sided and now a 3/10 in severity  No nausea, vomiting, appetite normal, eating without difficulty    No obstetric complaints--baby moving well, no LOF, VB or contractions      Vitals:    08/30/22 0809   BP: 136/64   Pulse: 88   Resp: 20   Temp: (!) 97 °F (36 1 °C)   SpO2:      Gen: resting comfortably, no acute distress  Pulm: normal respiratory effort     Blood pressures, blood work and urine studies currently not indicative of preeclampsia  Suspect headache due to underlying history of migraines, however, will continue to closely monitor to ensure no progression to preeclampsia  Plan for ophthalmology evaluation today  S/p NICU consult, has also received betamethasone, TID NST  Appreciate MFM recommendations and care given unusual presentation    Erika Perdomo MD   08/30/22   9:34 AM

## 2022-08-30 NOTE — UTILIZATION REVIEW
Initial Clinical Review    Admission: Date/Time/Statement:   Admission Orders (From admission, onward)     Ordered        08/29/22 1608  Inpatient Admission  Once                      Orders Placed This Encounter   Procedures    Inpatient Admission     Standing Status:   Standing     Number of Occurrences:   1     Order Specific Question:   Level of Care     Answer:   Med Surg [16]     Order Specific Question:   Estimated length of stay     Answer:   More than 2 Midnights     Order Specific Question:   Certification     Answer:   I certify that inpatient services are medically necessary for this patient for a duration of greater than two midnights  See H&P and MD Progress Notes for additional information about the patient's course of treatment  ED Arrival Information     Patient not seen in ED                     Chief Complaint   Patient presents with    Headache    Leg Swelling       Initial Presentation: 27 y o  female presented to L&D G 3 P 0 @ 35 2/7 weeks gestation for intractable headache with new onset vision changes in the setting of gestational hypertension and need to rule out evolving preeclampsia  Symptoms we observe are headaches that are persistent, vision changes, chest pain, shortness of breath and RUQ/epigastric pain  At this time, she does have a persistent headache with new onset visual changes that were not there previously  She also denies a history of visual changes with her history of migraines  This new symptom is concerning for evolving preeclampsia with severe features and warrants further and closer monitoring  Her blood pressures have been non-severe, labs are stable and she continues to not have proteinuria  Plan if headache persist will delivery @ 34 weeks   NST TID   FHT:  Baseline Rate: 145 bpm  Variability: Moderate 6-25 bpm  Accelerations: 15 x 15 or greater  Decelerations: None  FHR Category: Category I     TOCO:   Contraction Frequency (minutes): none  Contraction Quality: Not applicable  S/p BTM 2/18-2/29  NICU Consultation   Normal fetal growth 8/21        Date: 08-30-22   33w3d admitted with intractable headache right-sided and now a 5/10 in severity Neurologic exam is benign without focal findings   S/p IVF, reglan, benadryl and HA cocktail We did discuss if this is severe preeclampsia that delivery would be at 34 weeks  She has received a full course of BTM and NICU is on board to talk with her and her partner  We also discussed treatment for severe preeclampsia including magnesium sulfate infusion to prevent seizures  Ophthalmology consult :  Headaches  Eye exam unremarkable    Of note, there is no papilledema and no findings consistent with preeclampsia    ED Triage Vitals   Temperature Pulse Respirations Blood Pressure SpO2   08/29/22 1334 08/29/22 1348 08/29/22 1348 08/29/22 1348 08/29/22 1635   98 3 °F (36 8 °C) (!) 107 20 134/70 98 %      Temp Source Heart Rate Source Patient Position - Orthostatic VS BP Location FiO2 (%)   08/29/22 1334 -- 08/29/22 1635 08/29/22 1635 --   Oral  Sitting Right arm       Pain Score       08/29/22 1334       4          Wt Readings from Last 1 Encounters:   08/29/22 104 kg (230 lb)     Additional Vital Signs:     Date/Time Temp Pulse Resp BP SpO2 O2 Device Cardiac (WDL) Patient Position - Orthostatic VS   08/30/22 1202 98 3 °F (36 8 °C) 100 20 134/72 99 % -- -- --   08/30/22 0809 97 °F (36 1 °C) Abnormal  88 20 136/64 -- -- -- --   08/30/22 0408 98 4 °F (36 9 °C) 77 18 130/59 98 % None (Room air) -- Lying   08/30/22 0102 98 °F (36 7 °C) 78 18 120/59 96 % None (Room air) -- Lying   08/29/22 2005 98 3 °F (36 8 °C) 96 20 136/63 95 % None (Room air) -- Lying   08/29/22 1930 -- -- -- -- -- None (Room air) WDL --   08/29/22 1635 98 4 °F (36 9 °C) 87 18 141/71 98 % None (Room air) -- Sitting   08/29/22 1500 -- -- -- 132/74 -- -- -- --   08/29/22 1433 -- 108 Abnormal  -- 135/75 -- -- -- --   08/29/22 1418 -- 106 Abnormal  -- 128/69 -- -- -- -- 08/29/22 1403 -- 115 Abnormal  -- 136/78 -- -- --                  Pertinent Labs/Diagnostic Test Results:   No orders to display         Results from last 7 days   Lab Units 08/30/22  0527 08/29/22  0738 08/24/22  0505   WBC Thousand/uL 12 64* 10 54* 14 04*   HEMOGLOBIN g/dL 12 0 12 2 11 6   HEMATOCRIT % 35 7 36 7 35 3   PLATELETS Thousands/uL 265 264 293         Results from last 7 days   Lab Units 08/30/22  0527 08/29/22  0738 08/24/22  0505   SODIUM mmol/L 134* 137 136   POTASSIUM mmol/L 4 3 4 0 3 9   CHLORIDE mmol/L 106 106 107   CO2 mmol/L 22 24 21   ANION GAP mmol/L 6 7 8   BUN mg/dL 7 5 8   CREATININE mg/dL 0 48* 0 55* 0 53*   EGFR ml/min/1 73sq m 137 131 133   CALCIUM mg/dL 9 5 9 5 9 4     Results from last 7 days   Lab Units 08/30/22  0527 08/29/22  0738 08/24/22  0505   AST U/L 10* 13 9*   ALT U/L 13 17 10   ALK PHOS U/L 67 71 60   TOTAL PROTEIN g/dL 5 9* 5 9* 6 0*   ALBUMIN g/dL 3 3* 2 6* 3 3*   TOTAL BILIRUBIN mg/dL 0 33 0 28 0 21         Results from last 7 days   Lab Units 08/30/22  0527 08/24/22  0505   GLUCOSE RANDOM mg/dL 157* 130     Results from last 7 days   Lab Units 08/29/22 2358 08/29/22  0738   CREATININE UR mg/dL 26 8 <13 0   PROTEIN UR mg/dL 4 <6   PROT/CREAT RATIO UR  0 15*  --            Past Medical History:   Diagnosis Date    Migraine      Present on Admission:   Gestational hypertension, third trimester   Persistent headaches      Admitting Diagnosis: Preeclampsia, unspecified trimester [O14 90]  33 weeks gestation of pregnancy [Z3A 33]  Age/Sex: 27 y o  female  Admission Orders:  Scheduled Medications:  cyproheptadine, 4 mg, Oral, TID  magnesium oxide, 400 mg, Oral, BID      Continuous IV Infusions:     PRN Meds:  acetaminophen, 975 mg, Oral, Q8H PRN  cyclobenzaprine, 10 mg, Oral, TID PRN  diphenhydrAMINE, 25 mg, Intravenous, Q6H PRN  metoclopramide, 10 mg, Oral, Q6H PRN        IP CONSULT TO PERINATOLOGY  IP CONSULT TO NEONATOLOGY  IP CONSULT TO ANESTHESIOLOGY  IP CONSULT TO OPHTHALMOLOGY    Network Utilization Review Department  ATTENTION: Please call with any questions or concerns to [phone] and carefully listen to the prompts so that you are directed to the right person  All voicemails are confidential   Thelma Choe all requests for admission clinical reviews, approved or denied determinations and any other requests to dedicated fax number below belonging to the campus where the patient is receiving treatment   List of dedicated fax numbers for the Facilities:  [address] DENIALS (Administrative/Medical Necessity) [phone]   [address] (Maternity/NICU/Pediatrics) [phone]    [address]  [address] [facility] [place] [address] [address] [place] [address] [address] [facility] [place] [address] [phone]

## 2022-08-30 NOTE — CONSULTS
NICU Prenatal Consult   Miesha Leung 27 y o  female MRN: [id number]  Unit/Bed#: -01 Encounter: [id number]    Consulting Physician: Bret Burgos MD      A NICU Prenatal Consult has been requested by ob team due to possible  birth due to gestational hypertension, and rule out preecalmpsia  Miesha Leung is a 27 y o  , at [address] is expecting a female, [facility] [place]  She intends to breastfeed  Prenatal Care:Yes, description Gestational hypertension with occasional headache, and vision abnormalities  Prenatal Labs:  Lab Results   Component Value Date/Time    ABO Grouping A 2022 05:43 AM    Rh Factor Positive 2022 05:43 AM    Hepatitis B Surface Ag negative 03/10/2022 12:00 AM    RPR Non-Reactive 2022 10:34 AM    HIV-1/HIV-2 AB Non-Reactive 03/10/2022 12:00 AM       Unknown labs at this time: None    Pregnancy complications:   Patient Active Problem List   Diagnosis    33 weeks gestation of pregnancy    Gestational hypertension, third trimester    Hx of migraines    Persistent headaches     Maternal medical history:   Past Medical History:   Diagnosis Date    Migraine      Medications at home:   Medications Prior to Admission   Medication    acetaminophen (TYLENOL) 325 mg tablet    cyclobenzaprine (FLEXERIL) 10 mg tablet    cyproheptadine (PERIACTIN) 4 mg tablet    magnesium oxide (MAG-OX) 400 mg    metoclopramide (Reglan) 10 mg tablet    Prenatal Vit-Fe Fumarate-FA (PRENATAL VITAMIN PO)    Riboflavin 400 MG CAPS     Maternal social history:  Social History     Tobacco Use    Smoking status: Never Smoker    Smokeless tobacco: Never Used   Substance Use Topics    Alcohol use: Not Currently     Maternal  medications:  steroids: -Mg sulfate      I spoke with Miesha Leung today regarding the upcoming birth of her daughter    We discussed the baby's possible medical problems and the specialized care needed to address these problems  This discussion included, but was not limited to: Attendance of physician/FILI, nursing, and/or respiratory therapist in the delivery and delivery room management , Risk of feedings problems and potential need for IV fluids or gavage tube feeds, Risk of hypoglycemia requiring formula feeding if breastmilk is unavailable or IV dextrose infusion, Risk of hypothermia and need for radiant warmer and/or isolette, Risk of immature cardiorespiratory control and need for monitoring and/or caffeine , Risk of jaundice requiring phototherapy and Risk of respiratory distress and possible need for support (oxygen, CPAP, intubation, and/or surfactant)      All of Dilcia's questions were answered to the best of my ability, and she was encouraged to contact us with any further questions  Thank you for including us in the care of [facility]  Please reach out to the on-call Neonatologist via [employer] for any further questions that may arise  I spent 45 minutes in consultation with [facility], of which 33% was in direct communication      Miriam Winter MD  - Medicine

## 2022-08-30 NOTE — PROGRESS NOTES
Progress Note - Maternal Fetal Medicine   Roel Washington 27 y o  female MRN: [id number]  Unit/Bed#: -01 Encounter: [id number]  Admit date: 8/29/2022  Today's date: 08/30/22    Assessment/Plan     Ms Bear Marshall is a 27 y o  Shahab Celestino at [address] [facility] Day: 2, admitted for intractable headache with new onset vision changes in the setting of gestational hypertension and need to rule out evolving preeclampsia   By issue:    Persistent headaches  Assessment & Plan  Continue tylenol, reglan, oral magnesium, cyproheptadine and flexeril  Negative head imaging from 8/21  Neurologic exam is benign without focal findings   S/p IVF, reglan, benadryl and HA cocktail   Headache is still present, right temporal- now rating 5/10  Vision disturbances absent this morning   Opthalmology consult placed- appreciate recommendations     Gestational hypertension, third trimester  Assessment & Plan  Admitted to antepartum service last week with concern intractable headache and ultimately discharged  Most recent preeclampsia labs drawn this morning were negative   Now noted to have persistent headache with new onset vision changes   Discussed with patient and her partner the diagnosis of preeclampsia- signs and symptoms we observe and blood pressure monitoring  Symptoms we observe are headaches that are persistent, vision changes, chest pain, shortness of breath and RUQ/epigastric pain  At this time, she does have a persistent headache with new onset visual changes that were not there previously  She also denies a history of visual changes with her history of migraines  This new symptom is concerning for evolving preeclampsia with severe features and warrants further and closer monitoring  Her blood pressures have been non-severe, labs are stable and she continues to not have proteinuria  However, things can change acutely with preeclampsia and her symptoms necessitate monitoring   We did discuss if this is severe preeclampsia that delivery would be at 34 weeks  She has received a full course of BTM and NICU is on board to talk with her and her partner  We also discussed treatment for severe preeclampsia including magnesium sulfate infusion to prevent seizures  Systolic (55AGQ), KIN:950 , Min:120 , LCO:910   Diastolic (72PMV), KCA:47, Min:59, Max:78    Lab Results   Component Value Date/Time    HGB 12 0 08/30/2022 05:27 AM     08/30/2022 05:27 AM    AST 10 (L) 08/30/2022 05:27 AM    ALT 13 08/30/2022 05:27 AM    CREATININE 0 48 (L) 08/30/2022 05:27 AM     Plan for 24 hour urine   Continue to monitor blood pressures and symptoms         * 33 weeks gestation of pregnancy  Assessment & Plan  S/p BTM 8/20-8/21  Normal fetal growth on 8/21  NST is reactive - continue TID   S/p NICU consultation              Subjective    Contractions: no  Loss of fluid: no  Vaginal bleeding: no  Fetal movement: yes    Pain: no  Headaches: yes  Visual changes: no  Chest pain: no  Shortness of breath: no  Nausea: no  Vomiting/Diarrhea: no  Dysuria: no  Leg pain: no          Objective      Patient Vitals for the past 24 hrs:   BP Temp Temp src Pulse Resp SpO2 Height Weight   08/30/22 0809 136/64 (!) 97 °F (36 1 °C) Oral 88 20 -- -- --   08/30/22 0408 130/59 98 4 °F (36 9 °C) Oral 77 18 98 % -- --   08/30/22 0102 120/59 98 °F (36 7 °C) Oral 78 18 96 % -- --   08/29/22 2005 136/63 98 3 °F (36 8 °C) Oral 96 20 95 % -- --   08/29/22 1635 141/71 98 4 °F (36 9 °C) Oral 87 18 98 % -- --   08/29/22 1500 132/74 -- -- -- -- -- 5' 6" (1 676 m) 104 kg (230 lb)   08/29/22 1433 135/75 -- -- (!) 108 -- -- -- --   08/29/22 1418 128/69 -- -- (!) 106 -- -- -- --   08/29/22 1403 136/78 -- -- (!) 115 -- -- -- --   08/29/22 1348 134/70 -- -- (!) 107 20 -- -- --   08/29/22 1334 -- 98 3 °F (36 8 °C) Oral -- -- -- -- --       Physical Exam  Constitutional:       Appearance: Normal appearance  Cardiovascular:      Rate and Rhythm: Normal rate and regular rhythm  Pulmonary:      Effort: Pulmonary effort is normal    Abdominal:      Palpations: Abdomen is soft  Tenderness: There is no abdominal tenderness  There is no guarding or rebound  Comments: Gravid    Musculoskeletal:      Right lower leg: No edema  Left lower leg: No edema  Neurological:      General: No focal deficit present  Mental Status: She is alert  Mental status is at baseline  Deep Tendon Reflexes:      Reflex Scores:       Bicep reflexes are 2+ on the right side and 2+ on the left side  Brachioradialis reflexes are 2+ on the right side and 2+ on the left side  Patellar reflexes are 2+ on the right side and 2+ on the left side    Psychiatric:         Mood and Affect: Mood normal          I/O     None          Invasive Devices  Timeline    Peripheral Intravenous Line  Duration           Peripheral IV 08/29/22 Distal;Left;Upper;Ventral (anterior) Arm <1 day                Results from last 7 days   Lab Units 08/30/22  0527 08/29/22  0738 08/24/22  0505   WBC Thousand/uL 12 64* 10 54* 14 04*   HEMOGLOBIN g/dL 12 0 12 2 11 6   MCV fL 86 88 89   PLATELETS Thousands/uL 265 264 293     Results from last 7 days   Lab Units 08/30/22  0527 08/29/22  0738 08/24/22  0505   POTASSIUM mmol/L 4 3 4 0 3 9   CHLORIDE mmol/L 106 106 107   CO2 mmol/L 22 24 21   BUN mg/dL 7 5 8   CREATININE mg/dL 0 48* 0 55* 0 53*   EGFR ml/min/1 73sq m 137 131 133     Results from last 7 days   Lab Units 08/30/22  0527 08/29/22  0738 08/24/22  0505   AST U/L 10* 13 9*   ALT U/L 13 17 10     Results from last 7 days   Lab Units 08/30/22  0527 08/29/22  0738 08/24/22  0505   PLATELETS Thousands/uL 265 264 293         Results from last 7 days   Lab Units 08/29/22  2358   PROT/CREAT RATIO UR  0 15*         Results from last 7 days   Lab Units 08/24/22  1455   PROTEIN 24H UR mg/24 hrs 266*           Brief review of pertinent history:  Past Medical History:   Diagnosis Date    Migraine      Past Surgical History:   Procedure Laterality Date    TONSILLECTOMY      WISDOM TOOTH EXTRACTION       OB History    Para Term  AB Living   3       2     SAB IAB Ectopic Multiple Live Births   2              # Outcome Date GA Lbr Sadiq/2nd Weight Sex Delivery Anes PTL Lv   3 Current            2 2019           1 2017               FHT:  Baseline Rate: 125 bpm  Variability: Moderate 6-25 bpm  Accelerations: 15 x 15 or greater  Decelerations: None  FHR Category: Category I    TOCO:   Contraction Frequency (minutes): 0  Contraction Quality: Not applicable          MEDS:   Medication Administration - last 24 hours from 2022 0835 to 2022 8032       Date/Time Order Dose Route Action Action by     2022 1615 lactated ringers bolus 1,000 mL 0 mL Intravenous Stopped [employer], RN     2022 1501 lactated ringers bolus 1,000 mL 1,000 mL Intravenous [address] Debby Hernandez RN     2022 1502 metoclopramide (REGLAN) injection 10 mg 10 mg Intravenous Given Debby Hernandez RN     2022 1504 diphenhydrAMINE (BENADRYL) injection 25 mg 25 mg Intravenous Given Debby Hernandez RN     2022 0617 acetaminophen (TYLENOL) tablet 975 mg 975 mg Oral Given Guy JW Huertas     2022 1659 acetaminophen (TYLENOL) tablet 975 mg 975 mg Oral Given [employer], RN     2022 2313 cyproheptadine (PERIACTIN) tablet 4 mg 4 mg Oral Given Kayleigh Ann, JW     2022 1809 cyproheptadine (PERIACTIN) tablet 4 mg 4 mg Oral Given Ar Chavez, JW     2022 1809 magnesium oxide (MAG-OX) tablet 400 mg 400 mg Oral Given [employer], RN     2022 214 methylPREDNISolone sodium succinate (Solu-MEDROL) 500 mg in sodium chloride 0 9 % 250 mL IVPB 0 mg Intravenous Stopped Guy Huertas RN     2022 methylPREDNISolone sodium succinate (Solu-MEDROL) 500 mg in sodium chloride 0 9 % 250 mL IVPB 500 mg Intravenous New Bag Guy Huertas RN 08/29/2022 2041 magnesium sulfate 2 g/50 mL IVPB (premix) 2 g 0 g Intravenous Crispin Gaviria RN     08/29/2022 1828 magnesium sulfate 2 g/50 mL IVPB (premix) 2 g 2 g Intravenous New Robert Cordova, RN        Current Facility-Administered Medications   Medication Dose Route Frequency    acetaminophen (TYLENOL) tablet 975 mg  975 mg Oral Q8H PRN    cyclobenzaprine (FLEXERIL) tablet 10 mg  10 mg Oral TID PRN    cyproheptadine (PERIACTIN) tablet 4 mg  4 mg Oral TID    diphenhydrAMINE (BENADRYL) injection 25 mg  25 mg Intravenous Q6H PRN    magnesium oxide (MAG-OX) tablet 400 mg  400 mg Oral BID    metoclopramide (REGLAN) tablet 10 mg  10 mg Oral Q6H PRN            Cherelle Frye MD  OBGYN, PGY-3  8/30/2022  8:35 AM

## 2022-08-31 VITALS
SYSTOLIC BLOOD PRESSURE: 131 MMHG | TEMPERATURE: 97.9 F | HEIGHT: 66 IN | WEIGHT: 230.6 LBS | OXYGEN SATURATION: 96 % | HEART RATE: 100 BPM | DIASTOLIC BLOOD PRESSURE: 70 MMHG | BODY MASS INDEX: 37.06 KG/M2 | RESPIRATION RATE: 18 BRPM

## 2022-08-31 LAB
ALBUMIN SERPL BCP-MCNC: 3.2 G/DL (ref 3.5–5)
ALP SERPL-CCNC: 65 U/L (ref 34–104)
ALT SERPL W P-5'-P-CCNC: 12 U/L (ref 7–52)
ANION GAP SERPL CALCULATED.3IONS-SCNC: 6 MMOL/L (ref 4–13)
AST SERPL W P-5'-P-CCNC: 10 U/L (ref 13–39)
BILIRUB SERPL-MCNC: 0.21 MG/DL (ref 0.2–1)
BUN SERPL-MCNC: 7 MG/DL (ref 5–25)
CALCIUM ALBUM COR SERPL-MCNC: 9.8 MG/DL (ref 8.3–10.1)
CALCIUM SERPL-MCNC: 9.2 MG/DL (ref 8.4–10.2)
CHLORIDE SERPL-SCNC: 107 MMOL/L (ref 96–108)
CO2 SERPL-SCNC: 22 MMOL/L (ref 21–32)
CREAT SERPL-MCNC: 0.48 MG/DL (ref 0.6–1.3)
ERYTHROCYTE [DISTWIDTH] IN BLOOD BY AUTOMATED COUNT: 13.1 % (ref 11.6–15.1)
GFR SERPL CREATININE-BSD FRML MDRD: 137 ML/MIN/1.73SQ M
GLUCOSE SERPL-MCNC: 105 MG/DL (ref 65–140)
HCT VFR BLD AUTO: 34.9 % (ref 34.8–46.1)
HGB BLD-MCNC: 11.5 G/DL (ref 11.5–15.4)
MCH RBC QN AUTO: 29.1 PG (ref 26.8–34.3)
MCHC RBC AUTO-ENTMCNC: 33 G/DL (ref 31.4–37.4)
MCV RBC AUTO: 88 FL (ref 82–98)
PLATELET # BLD AUTO: 280 THOUSANDS/UL (ref 149–390)
PMV BLD AUTO: 9.9 FL (ref 8.9–12.7)
POTASSIUM SERPL-SCNC: 4 MMOL/L (ref 3.5–5.3)
PROT 24H UR-MCNC: 240 MG/24 HRS (ref 40–150)
PROT SERPL-MCNC: 5.6 G/DL (ref 6.4–8.4)
RBC # BLD AUTO: 3.95 MILLION/UL (ref 3.81–5.12)
SODIUM SERPL-SCNC: 135 MMOL/L (ref 135–147)
SPECIMEN VOL UR: 4800 ML
WBC # BLD AUTO: 13.41 THOUSAND/UL (ref 4.31–10.16)

## 2022-08-31 PROCEDURE — 99232 SBSQ HOSP IP/OBS MODERATE 35: CPT | Performed by: OBSTETRICS & GYNECOLOGY

## 2022-08-31 PROCEDURE — 84156 ASSAY OF PROTEIN URINE: CPT | Performed by: OBSTETRICS & GYNECOLOGY

## 2022-08-31 PROCEDURE — 80053 COMPREHEN METABOLIC PANEL: CPT | Performed by: OBSTETRICS & GYNECOLOGY

## 2022-08-31 PROCEDURE — 85027 COMPLETE CBC AUTOMATED: CPT | Performed by: OBSTETRICS & GYNECOLOGY

## 2022-08-31 RX ADMIN — CYPROHEPTADINE HYDROCHLORIDE 4 MG: 4 TABLET ORAL at 09:45

## 2022-08-31 RX ADMIN — MAGNESIUM OXIDE TAB 400 MG (241.3 MG ELEMENTAL MG) 400 MG: 400 (241.3 MG) TAB at 09:45

## 2022-08-31 RX ADMIN — ACETAMINOPHEN 975 MG: 325 TABLET ORAL at 05:42

## 2022-08-31 NOTE — PLAN OF CARE
Problem: PAIN - ADULT  Goal: Verbalizes/displays adequate comfort level or baseline comfort level  Description: Interventions:  - Encourage patient to monitor pain and request assistance  - Assess pain using appropriate pain scale  - Administer analgesics based on type and severity of pain and evaluate response  - Implement non-pharmacological measures as appropriate and evaluate response  - Consider cultural and social influences on pain and pain management  - Notify physician/advanced practitioner if interventions unsuccessful or patient reports new pain  Outcome: Completed     Problem: INFECTION - ADULT  Goal: Absence or prevention of progression during hospitalization  Description: INTERVENTIONS:  - Assess and monitor for signs and symptoms of infection  - Monitor lab/diagnostic results  - Monitor all insertion sites, i e  indwelling lines, tubes, and drains  - Monitor endotracheal if appropriate and nasal secretions for changes in amount and color  - [place] appropriate cooling/warming therapies per order  - Administer medications as ordered  - Instruct and encourage patient and family to use good hand hygiene technique  - Identify and instruct in appropriate isolation precautions for identified infection/condition  Outcome: Completed  Goal: Absence of fever/infection during neutropenic period  Description: INTERVENTIONS:  - Monitor WBC    Outcome: Completed     Problem: SAFETY ADULT  Goal: Patient will remain free of falls  Description: INTERVENTIONS:  - Educate patient/family on patient safety including physical limitations  - Instruct patient to call for assistance with activity   - Consult OT/PT to assist with strengthening/mobility   - Keep Call bell within reach  - Keep bed low and locked with side rails adjusted as appropriate  - Keep care items and personal belongings within reach  - Initiate and maintain comfort rounds  - Make Fall Risk Sign visible to staff  - Offer Toileting in advance of need  - Obtain necessary fall risk management equipment  - Apply yellow socks and bracelet for high fall risk patients  - Consider moving patient to room near nurses station  Outcome: Completed  Goal: Maintain or return to baseline ADL function  Description: INTERVENTIONS:  -  Assess patient's ability to carry out ADLs; assess patient's baseline for ADL function and identify physical deficits which impact ability to perform ADLs (bathing, care of mouth/teeth, toileting, grooming, dressing, etc )  - Assess/evaluate cause of self-care deficits   - Assess range of motion  - Assess patient's mobility; develop plan if impaired  - Assess patient's need for assistive devices and provide as appropriate  - Encourage maximum independence but intervene and supervise when necessary  - Involve family in performance of ADLs  - Assess for home care needs following discharge   - Consider OT consult to assist with ADL evaluation and planning for discharge  - Provide patient education as appropriate  Outcome: Completed  Goal: Maintains/Returns to pre admission functional level  Description: INTERVENTIONS:  - Perform BMAT or MOVE assessment daily    - Set and communicate daily mobility goal to care team and patient/family/caregiver  - Collaborate with rehabilitation services on mobility goals if consulted  - Record patient progress and toleration of activity level   Outcome: Completed     Problem: Knowledge Deficit  Goal: Patient/family/caregiver demonstrates understanding of disease process, treatment plan, medications, and discharge instructions  Description: Complete learning assessment and assess knowledge base    Interventions:  - Provide teaching at level of understanding  - Provide teaching via preferred learning methods  Outcome: Completed     Problem: DISCHARGE PLANNING  Goal: Discharge to home or other facility with appropriate resources  Description: INTERVENTIONS:  - Identify barriers to discharge w/patient and caregiver  - Arrange for needed discharge resources and transportation as appropriate  - Identify discharge learning needs (meds, wound care, etc )  - Arrange for interpretive services to assist at discharge as needed  - Refer to Case Management Department for coordinating discharge planning if the patient needs post-hospital services based on physician/advanced practitioner order or complex needs related to functional status, cognitive ability, or social support system  Outcome: Completed     Problem: ANTEPARTUM  Goal: Maintain pregnancy as long as maternal and/or fetal condition is stable  Description: INTERVENTIONS:  - Maternal surveillance  - Fetal surveillance  - Monitor uterine activity  - Medications as ordered  - Bedrest  Outcome: Completed

## 2022-08-31 NOTE — QUICK NOTE
Progress Note - OB  Miryam Grove 27 y o  female MRN: [id number]  Unit/Bed#: -01 Encounter: [id number]      Miryam Grove has headache still but much improved at bedrest in dark hospital room  Reviewed with patient who feels some triggers are bright sunlight and activity  Patient is eager for 24 hour protein to return normal so she can go home, lives in basement and will try to modify activity and exposures  Her mom plans to help her a lot post partum, (her partner works evening shift and will also be available as able  Denies contraction; has  no LOF, and no VB  Good FM  /73 (BP Location: Right arm)   Pulse 92   Temp 98 4 °F (36 9 °C) (Oral)   Resp 16   Ht 5' 6" (1 676 m)   Wt 105 kg (230 lb 9 6 oz)   LMP 2022   SpO2 96%   BMI 37 22 kg/m²     Physical Exam:   General: AAOx3  No acute distress  Abdominal: Soft, non-tender gravid uterus  Extremities: No edema or calf pain  FHT:  Baseline Rate: 135 bpm  Variability: Moderate 6-25 bpm  Accelerations: 15 x 15 or greater  Decelerations: None  FHR Category: Category I  TOCO:   Contraction Frequency (minutes): 0  Contraction Duration (seconds):  (n/a)  Contraction Quality: Not applicable    Lab Results   Component Value Date    WBC 13 41 (H) 2022    HGB 11 5 2022    HCT 34 9 2022     2022     Lab Results   Component Value Date    K 4 0 2022     2022    CO2 22 2022    BUN 7 2022    CREATININE 0 48 (L) 2022    AST 10 (L) 2022    ALT 12 2022       A/P: Miryam Grove is a 27 y o   at 33w4d admitted with headache  Currently in stable condition   Hospital Day: 3   1)Plan as per mfm  2) continue tylenol, reglan, oral magnesium, cyproheptadine, and flexeril  -normal head imaging, neuro exam, opthalmology exam  3) ghtn with normal labs and bp  4) DVT PPx: SCDs  5) Encouraged ambulation as tolerated  7) Dispo: if 24 hour urine appropriate consider discharge planning

## 2022-08-31 NOTE — UTILIZATION REVIEW
Notification of Discharge   This is a Notification of Discharge from our facility [address]  Please be advised that this patient has been discharge from our facility  Below you will find the admission and discharge date and time including the patients disposition  UTILIZATION REVIEW CONTACT:  Don Denton  Utilization   Network Utilization Review Department  Phone: [phone] x carefully listen to the prompts  All voicemails are confidential   Email: [email or web address]     PHYSICIAN ADVISORY SERVICES:  FOR ILOV-IR-VUUA REVIEW - MEDICAL NECESSITY DENIAL  Phone: [phone]  Fax: [phone]  Email: [email or web address]     PRESENTATION DATE: 8/29/2022  1:17 PM  OBERVATION ADMISSION DATE:   INPATIENT ADMISSION DATE: 8/29/22  4:08 PM   DISCHARGE DATE: 8/31/2022 12:52 PM  DISPOSITION: Home/Self Care Home/Self Care      IMPORTANT INFORMATION:  Send all requests for admission clinical reviews, approved or denied determinations and any other requests to dedicated fax number below belonging to the campus where the patient is receiving treatment   List of dedicated fax numbers:  [address] DENIALS (Administrative/Medical Necessity) [phone]   [address] [place] [address] (Maternity/NICU/Pediatrics) [phone]   [facility] [phone]   [address] [place] [address] [phone]   [address] [place] [address] [phone]   [address] [place] [address],[address] [phone]   [facility]  [phone]   [address] [place], [place] [address] [facility] [address] [facility] [phone]

## 2022-08-31 NOTE — PLAN OF CARE
Problem: PAIN - ADULT  Goal: Verbalizes/displays adequate comfort level or baseline comfort level  Description: Interventions:  - Encourage patient to monitor pain and request assistance  - Assess pain using appropriate pain scale  - Administer analgesics based on type and severity of pain and evaluate response  - Implement non-pharmacological measures as appropriate and evaluate response  - Consider cultural and social influences on pain and pain management  - Notify physician/advanced practitioner if interventions unsuccessful or patient reports new pain  Outcome: Progressing     Problem: INFECTION - ADULT  Goal: Absence or prevention of progression during hospitalization  Description: INTERVENTIONS:  - Assess and monitor for signs and symptoms of infection  - Monitor lab/diagnostic results  - Monitor all insertion sites, i e  indwelling lines, tubes, and drains  - Monitor endotracheal if appropriate and nasal secretions for changes in amount and color  - [place] appropriate cooling/warming therapies per order  - Administer medications as ordered  - Instruct and encourage patient and family to use good hand hygiene technique  - Identify and instruct in appropriate isolation precautions for identified infection/condition  Outcome: Progressing  Goal: Absence of fever/infection during neutropenic period  Description: INTERVENTIONS:  - Monitor WBC    Outcome: Progressing     Problem: SAFETY ADULT  Goal: Patient will remain free of falls  Description: INTERVENTIONS:  - Educate patient/family on patient safety including physical limitations  - Instruct patient to call for assistance with activity   - Consult OT/PT to assist with strengthening/mobility   - Keep Call bell within reach  - Keep bed low and locked with side rails adjusted as appropriate  - Keep care items and personal belongings within reach  - Initiate and maintain comfort rounds  - Make Fall Risk Sign visible to staff  - Offer Toileting in advance of need  - Obtain necessary fall risk management equipment  - Apply yellow socks and bracelet for high fall risk patients  - Consider moving patient to room near nurses station  Outcome: Progressing  Goal: Maintain or return to baseline ADL function  Description: INTERVENTIONS:  -  Assess patient's ability to carry out ADLs; assess patient's baseline for ADL function and identify physical deficits which impact ability to perform ADLs (bathing, care of mouth/teeth, toileting, grooming, dressing, etc )  - Assess/evaluate cause of self-care deficits   - Assess range of motion  - Assess patient's mobility; develop plan if impaired  - Assess patient's need for assistive devices and provide as appropriate  - Encourage maximum independence but intervene and supervise when necessary  - Involve family in performance of ADLs  - Assess for home care needs following discharge   - Consider OT consult to assist with ADL evaluation and planning for discharge  - Provide patient education as appropriate  Outcome: Progressing  Goal: Maintains/Returns to pre admission functional level  Description: INTERVENTIONS:  - Perform BMAT or MOVE assessment daily    - Set and communicate daily mobility goal to care team and patient/family/caregiver  - Collaborate with rehabilitation services on mobility goals if consulted  - Record patient progress and toleration of activity level   Outcome: Progressing     Problem: Knowledge Deficit  Goal: Patient/family/caregiver demonstrates understanding of disease process, treatment plan, medications, and discharge instructions  Description: Complete learning assessment and assess knowledge base    Interventions:  - Provide teaching at level of understanding  - Provide teaching via preferred learning methods  Outcome: Progressing     Problem: DISCHARGE PLANNING  Goal: Discharge to home or other facility with appropriate resources  Description: INTERVENTIONS:  - Identify barriers to discharge w/patient and caregiver  - Arrange for needed discharge resources and transportation as appropriate  - Identify discharge learning needs (meds, wound care, etc )  - Arrange for inteetive services to assist at discharge as needed  - Refer to Case Management Department for coordinating discharge planning if the patient needs post-hospital services based on physician/advanced practitioner order or complex needs related to functional status, cognitive ability, or social support system  Outcome: Progressing     Problem: ANTEPARTUM  Goal: Maintain pregnancy as long as maternal and/or fetal condition is stable  Description: INTERVENTIONS:  - Maternal surveillance  - Fetal surveillance  - Monitor uterine activity  - Medications as ordered  - Bedrest  Outcome: Progressing

## 2022-08-31 NOTE — PROGRESS NOTES
Progress Note - Maternal Fetal Medicine   Estephanie Flores 27 y o  female MRN: [id number]  Unit/Bed#: -01 Encounter: [id number]  Admit date: 8/29/2022  Today's date: 08/31/22    Assessment/Plan     Ms Sdueep Brand is a 27 y o  [place] Arechiga at [address] [facility] Day: 3, admitted for intractable headache with new onset vision changes in the setting of gestational hypertension and need to rule out evolving preeclampsia   By issue:    Persistent headaches  Assessment & Plan  Continue tylenol, reglan, oral magnesium, cyproheptadine and flexeril  Negative head imaging from 8/21  Neurologic exam is benign without focal findings   S/p IVF, reglan, benadryl and HA cocktail   Headache improving now 2/10   Vision disturbances absent this morning   S/p Opthalmology consult- normal examination     Gestational hypertension, third trimester  Assessment & Plan  Patient reports headache is 2/10 this morning  Denies vision changes, chest pain, shortness of breath, RUQ/epigastric pain     Pree labs are WNL   Follow up 24h protein     Systolic (81IOQ), OKD:650 , Min:127 , AKQ:986   Diastolic (36FCA), LHF:16, Min:61, Max:72    Lab Results   Component Value Date/Time    HGB 11 5 08/31/2022 05:49 AM     08/31/2022 05:49 AM    AST 10 (L) 08/31/2022 05:49 AM    ALT 12 08/31/2022 05:49 AM    CREATININE 0 48 (L) 08/31/2022 05:49 AM         * 33 weeks gestation of pregnancy  Assessment & Plan  S/p BTM 8/20-8/21  Normal fetal growth on 8/21  NST is reactive - continue TID   S/p NICU consultation              Subjective    Contractions: no  Loss of fluid: no  Vaginal bleeding: no  Fetal movement: yes    Pain: no  Headaches: yes  Visual changes: no  Chest pain: no  Shortness of breath: no  Nausea: no  Vomiting/Diarrhea: no  Dysuria: no  Leg pain: no          Objective      Patient Vitals for the past 24 hrs:   BP Temp Temp src Pulse Resp SpO2   08/30/22 2343 127/61 98 2 °F (36 8 °C) Oral 88 18 94 %   08/30/22 1959 135/64 98 6 °F (37 °C) Oral 85 18 95 %   08/30/22 1600 137/62 98 4 °F (36 9 °C) Oral 102 18 96 %   08/30/22 1202 134/72 98 3 °F (36 8 °C) Oral 100 20 99 %   08/30/22 0809 136/64 (!) 97 °F (36 1 °C) Oral 88 20 --       Physical Exam  Constitutional:       Appearance: Normal appearance  Cardiovascular:      Rate and Rhythm: Normal rate and regular rhythm  Pulmonary:      Effort: Pulmonary effort is normal    Abdominal:      Palpations: Abdomen is soft  Tenderness: There is no abdominal tenderness  There is no guarding or rebound  Comments: Gravid    Musculoskeletal:      Right lower leg: No edema  Left lower leg: No edema  Neurological:      General: No focal deficit present  Mental Status: She is alert  Mental status is at baseline  Deep Tendon Reflexes:      Reflex Scores:       Bicep reflexes are 2+ on the right side and 2+ on the left side  Brachioradialis reflexes are 2+ on the right side and 2+ on the left side  Patellar reflexes are 2+ on the right side and 2+ on the left side    Psychiatric:         Mood and Affect: Mood normal          I/O     None          Invasive Devices  Timeline    Peripheral Intravenous Line  Duration           Peripheral IV 08/29/22 Distal;Left;Upper;Ventral (anterior) Arm 1 day                Results from last 7 days   Lab Units 08/31/22  0549 08/30/22  0527 08/29/22  0738   WBC Thousand/uL 13 41* 12 64* 10 54*   HEMOGLOBIN g/dL 11 5 12 0 12 2   MCV fL 88 86 88   PLATELETS Thousands/uL 280 265 264     Results from last 7 days   Lab Units 08/31/22  0549 08/30/22  0527 08/29/22  0738   POTASSIUM mmol/L 4 0 4 3 4 0   CHLORIDE mmol/L 107 106 106   CO2 mmol/L 22 22 24   BUN mg/dL 7 7 5   CREATININE mg/dL 0 48* 0 48* 0 55*   EGFR ml/min/1 73sq m 137 137 131     Results from last 7 days   Lab Units 08/31/22  0549 08/30/22  0527 08/29/22  0738   AST U/L 10* 10* 13   ALT U/L 12 13 17     Results from last 7 days   Lab Units 08/31/22  0549 08/30/22  0527 08/29/22  0423 PLATELETS Thousands/uL 280 265 264         Results from last 7 days   Lab Units 22  2358   PROT/CREAT RATIO UR  0 15*         Results from last 7 days   Lab Units 22  1455   PROTEIN 24H UR mg/24 hrs 266*           Brief review of pertinent history:  Past Medical History:   Diagnosis Date    Migraine      Past Surgical History:   Procedure Laterality Date    TONSILLECTOMY      WISDOM TOOTH EXTRACTION       OB History    Para Term  AB Living   3       2     SAB IAB Ectopic Multiple Live Births   2              # Outcome Date GA Lbr Sadiq/2nd Weight Sex Delivery Anes PTL Lv   3 Current            2 2019           1 2017               FHT:  Baseline Rate: 135 bpm  Variability: Moderate 6-25 bpm  Accelerations: 15 x 15 or greater  Decelerations: None  FHR Category: Category I    TOCO:   Contraction Frequency (minutes): 0  Contraction Duration (seconds):  (n/a)  Contraction Quality: Not applicable          MEDS:   Medication Administration - last 24 hours from 2022 0645 to 2022 0645       Date/Time Order Dose Route Action Action by     2022 0542 acetaminophen (TYLENOL) tablet 975 mg 975 mg Oral Given Abbipayton Carter RN     2022 1450 acetaminophen (TYLENOL) tablet 975 mg 975 mg Oral Given [employer], RN     2022 2207 cyclobenzaprine (FLEXERIL) tablet 10 mg 10 mg Oral Given Abbi Carter RN     2022 2118 cyproheptadine (PERIACTIN) tablet 4 mg 4 mg Oral Given Abbipayton Carter RN     2022 1615 cyproheptadine (PERIACTIN) tablet 4 mg 4 mg Oral Given [employer], RN     2022 0851 cyproheptadine (PERIACTIN) tablet 4 mg 4 mg Oral Given [employer], RN     2022 1901 magnesium oxide (MAG-OX) tablet 400 mg 400 mg Oral Given [employer], RN     2022 7941 magnesium oxide (MAG-OX) tablet 400 mg 400 mg Oral Given Ar Hope RN        Current Facility-Administered Medications   Medication Dose Route Frequency    acetaminophen (TYLENOL) tablet 975 mg  975 mg Oral Q8H PRN    cyclobenzaprine (FLEXERIL) tablet 10 mg  10 mg Oral TID PRN    cyproheptadine (PERIACTIN) tablet 4 mg  4 mg Oral TID    diphenhydrAMINE (BENADRYL) injection 25 mg  25 mg Intravenous Q6H PRN    magnesium oxide (MAG-OX) tablet 400 mg  400 mg Oral BID    metoclopramide (REGLAN) tablet 10 mg  10 mg Oral Q6H PRN            Fredy Guido MD  OBGYN, PGY-3  8/31/2022  6:45 AM

## 2022-08-31 NOTE — QUICK NOTE
24 hour urine protein 240 will discharge, has follow up reviewed precautions, already set for 37 week IOL if no changes

## 2022-09-02 ENCOUNTER — ROUTINE PRENATAL (OUTPATIENT)
Dept: OBGYN CLINIC | Facility: CLINIC | Age: 27
End: 2022-09-02

## 2022-09-02 ENCOUNTER — TELEPHONE (OUTPATIENT)
Dept: OBGYN CLINIC | Facility: CLINIC | Age: 27
End: 2022-09-02

## 2022-09-02 VITALS
WEIGHT: 230.4 LBS | HEIGHT: 65 IN | OXYGEN SATURATION: 98 % | HEART RATE: 102 BPM | SYSTOLIC BLOOD PRESSURE: 134 MMHG | DIASTOLIC BLOOD PRESSURE: 70 MMHG | BODY MASS INDEX: 38.39 KG/M2

## 2022-09-02 DIAGNOSIS — G93.5 CHIARI I MALFORMATION (HCC): ICD-10-CM

## 2022-09-02 DIAGNOSIS — R51.9 PERSISTENT HEADACHES: ICD-10-CM

## 2022-09-02 DIAGNOSIS — Z3A.33 33 WEEKS GESTATION OF PREGNANCY: Primary | ICD-10-CM

## 2022-09-02 DIAGNOSIS — O13.3 GESTATIONAL HYPERTENSION, THIRD TRIMESTER: ICD-10-CM

## 2022-09-02 PROCEDURE — PNV: Performed by: STUDENT IN AN ORGANIZED HEALTH CARE EDUCATION/TRAINING PROGRAM

## 2022-09-02 NOTE — PATIENT INSTRUCTIONS
Pregnancy at 31 to 34 Weeks   AMBULATORY CARE:   Changes happening with your body: You may continue to have symptoms such as shortness of breath, heartburn, contractions, or swelling of your ankles and feet  You may be gaining about 1 pound a week now  Seek care immediately if:   You develop a severe headache that does not go away  You have new or increased vision changes, such as blurred or spotted vision  You have new or increased swelling in your face or hands  You have vaginal spotting or bleeding  Your water broke or you feel warm water gushing or trickling from your vagina  Call your obstetrician if:   You have more than 5 contractions in 1 hour  You notice any changes in your baby's movements  You have abdominal cramps, pressure, or tightening  You have a change in vaginal discharge  You have chills or a fever  You have vaginal itching, burning, or pain  You have yellow, green, white, or foul-smelling vaginal discharge  You have pain or burning when you urinate, less urine than usual, or pink or bloody urine  You have questions or concerns about your condition or care  How to care for yourself at this stage of your pregnancy:       Eat a variety of healthy foods  Healthy foods include fruits, vegetables, whole-grain breads, low-fat dairy foods, beans, lean meats, and fish  Drink liquids as directed  Ask how much liquid to drink each day and which liquids are best for you  Limit caffeine to less than 200 milligrams each day  Limit your intake of fish to 2 servings each week  Choose fish low in mercury such as canned light tuna, shrimp, salmon, cod, or tilapia  Do not  eat fish high in mercury such as swordfish, tilefish, adrianne mackerel, and shark  Manage heartburn  by eating 4 or 5 small meals each day instead of large meals  Avoid spicy food  Manage swelling  by lying down and putting your feet up  Take prenatal vitamins as directed    Your need for certain vitamins and minerals, such as folic acid, increases during pregnancy  Prenatal vitamins provide some of the extra vitamins and minerals you need  Prenatal vitamins may also help to decrease the risk of certain birth defects  Talk to your healthcare provider about exercise  Moderate exercise can help you stay fit  Your healthcare provider will help you plan an exercise program that is safe for you during pregnancy  Do not smoke  Smoking increases your risk of a miscarriage and other health problems during your pregnancy  Smoking can cause your baby to be born too early or weigh less at birth  Ask your healthcare provider for information if you need help quitting  Do not drink alcohol  Alcohol passes from your body to your baby through the placenta  It can affect your baby's brain development and cause fetal alcohol syndrome (FAS)  FAS is a group of conditions that causes mental, behavior, and growth problems  Talk to your healthcare provider before you take any medicines  Many medicines may harm your baby if you take them when you are pregnant  Do not take any medicines, vitamins, herbs, or supplements without first talking to your healthcare provider  Never use illegal or street drugs (such as marijuana or cocaine) while you are pregnant  Safety tips during pregnancy:   Avoid hot tubs and saunas  Do not use a hot tub or sauna while you are pregnant, especially during your first trimester  Hot tubs and saunas may raise your baby's temperature and increase the risk of birth defects  Avoid toxoplasmosis  This is an infection caused by eating raw meat or being around infected cat feces  It can cause birth defects, miscarriages, and other problems  Wash your hands after you touch raw meat  Make sure any meat is well-cooked before you eat it  Avoid raw eggs and unpasteurized milk  Use gloves or ask someone else to clean your cat's litter box while you are pregnant  Changes happening with your baby:  By 34 weeks, your baby may weigh more than 5 pounds  Your baby will be about 12 ½ inches long from the top of the head to the rump (baby's bottom)  Your baby is gaining about ½ pound a week  Your baby's eyes open and close now  Your baby's kicks and movements are more forceful at this time  What you need to know about prenatal care: Your healthcare provider will check your blood pressure and weight  You may also need the following:  A urine test  may also be done to check for sugar and protein  These can be signs of gestational diabetes or infection  Protein in your urine may also be a sign of preeclampsia  Preeclampsia is a condition that can develop during week 20 or later of your pregnancy  It causes high blood pressure, and it can cause problems with your kidneys and other organs  A gestational diabetes screen  may be done  Your healthcare provider may order either a 1-step or 2-step oral glucose tolerance test (OGTT)  1-step OGTT:  Your blood sugar level will be tested after you have not eaten for 8 hours (fasting)  You will then be given a glucose drink  Your level will be tested again 1 hour and 2 hours after you finish the drink  2-step OGTT:  You do not have to fast for the first part of the test  You will have the glucose drink at any time of day  Your blood sugar level will be checked 1 hour later  If your blood sugar is higher than a certain level, another test will be ordered  You will fast and your blood sugar level will be tested  You will have the glucose drink  Your blood will be tested again 1 hour, 2 hours, and 3 hours after you finish the glucose drink  A Tdap vaccine  may be recommended by your healthcare provider  Fundal height  is a measurement of your uterus to check your baby's growth  This number is usually the same as the number of weeks that you have been pregnant   Your healthcare provider may also check your baby's position  Your baby's heart rate  will be checked  Follow up with your obstetrician as directed:  Write down your questions so you remember to ask them during your visits  © Copyright [employer] 2022 Information is for End User's use only and may not be sold, redistributed or otherwise used for commercial purposes  All illustrations and images included in CareNotes® are the copyrighted property of A D A M , Inc  or Sharron Gunter   The above information is an  only  It is not intended as medical advice for individual conditions or treatments  Talk to your doctor, nurse or pharmacist before following any medical regimen to see if it is safe and effective for you

## 2022-09-02 NOTE — PROGRESS NOTES
Kenneth Graham is a 27 y o   33w6d  Reports ++FM, no LOF, VB, or contractions     Headache much more manageable 2/10  No recurrence of visual disturbances    Vitals:    22 0837   BP: 134/70   Pulse: 102   SpO2: 98%     +FHTs  NST reactive and reassuring    A/P:  Third tri labs wnl  [facility] labs [facility]  TDAP vaccine--22    Discussed pre-term labor precautions  Return to office in 2 week

## 2022-09-06 ENCOUNTER — APPOINTMENT (OUTPATIENT)
Dept: LAB | Facility: CLINIC | Age: 27
End: 2022-09-06
Payer: COMMERCIAL

## 2022-09-06 ENCOUNTER — ROUTINE PRENATAL (OUTPATIENT)
Dept: OBGYN CLINIC | Facility: CLINIC | Age: 27
End: 2022-09-06
Payer: COMMERCIAL

## 2022-09-06 VITALS
SYSTOLIC BLOOD PRESSURE: 142 MMHG | BODY MASS INDEX: 39.25 KG/M2 | WEIGHT: 235.6 LBS | DIASTOLIC BLOOD PRESSURE: 80 MMHG | HEIGHT: 65 IN | HEART RATE: 88 BPM

## 2022-09-06 DIAGNOSIS — Z86.69 HX OF MIGRAINES: ICD-10-CM

## 2022-09-06 DIAGNOSIS — Z3A.31 31 WEEKS GESTATION OF PREGNANCY: ICD-10-CM

## 2022-09-06 DIAGNOSIS — R51.9 PERSISTENT HEADACHES: ICD-10-CM

## 2022-09-06 DIAGNOSIS — Z3A.34 34 WEEKS GESTATION OF PREGNANCY: Primary | ICD-10-CM

## 2022-09-06 DIAGNOSIS — O13.3 GESTATIONAL HYPERTENSION, THIRD TRIMESTER: ICD-10-CM

## 2022-09-06 LAB
ALBUMIN SERPL BCP-MCNC: 3.5 G/DL (ref 3.5–5)
ALP SERPL-CCNC: 89 U/L (ref 34–104)
ALT SERPL W P-5'-P-CCNC: 14 U/L (ref 7–52)
ANION GAP SERPL CALCULATED.3IONS-SCNC: 7 MMOL/L (ref 4–13)
AST SERPL W P-5'-P-CCNC: 11 U/L (ref 13–39)
BILIRUB SERPL-MCNC: 0.26 MG/DL (ref 0.2–1)
BUN SERPL-MCNC: 6 MG/DL (ref 5–25)
CALCIUM SERPL-MCNC: 9.6 MG/DL (ref 8.4–10.2)
CHLORIDE SERPL-SCNC: 105 MMOL/L (ref 96–108)
CO2 SERPL-SCNC: 25 MMOL/L (ref 21–32)
CREAT SERPL-MCNC: 0.51 MG/DL (ref 0.6–1.3)
CREAT UR-MCNC: 33.8 MG/DL
ERYTHROCYTE [DISTWIDTH] IN BLOOD BY AUTOMATED COUNT: 13.2 % (ref 11.6–15.1)
GFR SERPL CREATININE-BSD FRML MDRD: 135 ML/MIN/1.73SQ M
GLUCOSE P FAST SERPL-MCNC: 98 MG/DL (ref 65–99)
HCT VFR BLD AUTO: 35.4 % (ref 34.8–46.1)
HGB BLD-MCNC: 12 G/DL (ref 11.5–15.4)
MCH RBC QN AUTO: 29.4 PG (ref 26.8–34.3)
MCHC RBC AUTO-ENTMCNC: 33.9 G/DL (ref 31.4–37.4)
MCV RBC AUTO: 87 FL (ref 82–98)
PLATELET # BLD AUTO: 255 THOUSANDS/UL (ref 149–390)
PMV BLD AUTO: 9.7 FL (ref 8.9–12.7)
POTASSIUM SERPL-SCNC: 3.8 MMOL/L (ref 3.5–5.3)
PROT SERPL-MCNC: 6.3 G/DL (ref 6.4–8.4)
PROT UR-MCNC: 9 MG/DL
PROT/CREAT UR: 0.27 MG/G{CREAT} (ref 0–0.1)
RBC # BLD AUTO: 4.08 MILLION/UL (ref 3.81–5.12)
SODIUM SERPL-SCNC: 137 MMOL/L (ref 135–147)
WBC # BLD AUTO: 11.76 THOUSAND/UL (ref 4.31–10.16)

## 2022-09-06 PROCEDURE — 36415 COLL VENOUS BLD VENIPUNCTURE: CPT

## 2022-09-06 PROCEDURE — 82570 ASSAY OF URINE CREATININE: CPT

## 2022-09-06 PROCEDURE — 85027 COMPLETE CBC AUTOMATED: CPT

## 2022-09-06 PROCEDURE — 84156 ASSAY OF PROTEIN URINE: CPT

## 2022-09-06 PROCEDURE — 99213 OFFICE O/P EST LOW 20 MIN: CPT | Performed by: OBSTETRICS & GYNECOLOGY

## 2022-09-06 PROCEDURE — 80053 COMPREHEN METABOLIC PANEL: CPT

## 2022-09-06 NOTE — ASSESSMENT & PLAN NOTE
- Continue PNV  - Labor precautions reviewed  - Fetal kick counts reviewed  - Labs: Reviewed & UTD  - Tdap: Administered 22  - Delivery:  with epidural; IOL scheduled for 22 @  for [address] in 2 weeks

## 2022-09-06 NOTE — ASSESSMENT & PLAN NOTE
S/p admission 8/22-8/25 and 8/29-8/31  Currently taking all medications as prescribed  Patient reports only dull headache today

## 2022-09-06 NOTE — PROGRESS NOTES
OB/GYN  PN Visit  Elias Ortega  [id number]  2022  1:43 PM  Dr Andrea Landeros MD    S: 27 y o  [id number] 34w3d here for PN visit  She denies contractions  She denies leakage of fluid and vaginal bleeding  She reports good fetal movement  She denies nausea, vomiting, cramping, edema, domestic violence, and smoking  She reports that her headache is dull and unchanged but overall she has been able to tolerate it well  She went to the beach this past weekend for a wedding/ baby-moon  Her pregnancy is complicated by gHTN and persistent headache  O:  Vitals:    22 1200   BP: 142/80   Pulse: 88     Physical Exam  Vitals reviewed  Constitutional:       General: She is not in acute distress  Appearance: Normal appearance  She is well-developed  She is not ill-appearing, toxic-appearing or diaphoretic  Cardiovascular:      Rate and Rhythm: Normal rate  Pulmonary:      Effort: Pulmonary effort is normal    Abdominal:      General: There is no distension  Palpations: Abdomen is soft  There is no mass  Tenderness: There is no abdominal tenderness  There is no guarding or rebound  Genitourinary:     Comments: Gravid, nontender  Skin:     General: Skin is warm and dry  Neurological:      Mental Status: She is alert and oriented to person, place, and time  Psychiatric:         Mood and Affect: Mood normal          Behavior: Behavior normal          NST: 140/ moderate/ accelerations/ no decelerations  No contractions    A/P:  Problem List        Unprioritized    34 weeks gestation of pregnancy    Current Assessment & Plan     - Continue PNV  - Labor precautions reviewed  - Fetal kick counts reviewed  - Labs: Reviewed & UTD  - Tdap: Administered 22  - Delivery:  with epidural; IOL scheduled for 22 @  for [address] in 2 weeks          Gestational hypertension, third trimester    Overview     Admitted at 32 weeks for prolonged headache   Had at least 2 elevated blood pressures in pregnancy, 1 when she presented for vaginal bleeding and the other when she presented for headache  Her headache took a few days before resolving  Currently she is on magnesium oxide 400 milligrams daily, riboflavin 400 milligrams daily, Flexeril 10 milligrams 3 times a day and cyproheptadine 4 milligrams 3 times a day  Review of her vital signs show normal blood pressures throughout her admission  Wonder if these elevated blood pressures are related to anxiety and not gestational hypertension  Her baseline preeclamptic labs throughout admission were normal   For now on discharge she can continue twice weekly NSTs weekly CARA and weekly baseline preeclamptic labs  As she gets further in pregnancy if we continue to see no elevated blood pressures then I suspect she does not have gestational hypertension and she would not need to be delivered at 37 weeks  We also discussed today that she is on multiple different medications to prophylax against a migraine  It is difficult to know if resolution of her headache was due to 1 medication or all of the medications  After she was home if she wants to try to wean off 1 medication at a time 1 week at a time that would be fine  I would however continue her on magnesium and riboflavin since these are just supplements            Current Assessment & Plan     BP today elevated  S/p admission x2 for HA  Prior labs wnl; Patient will complete labs today             Hx of migraines    Persistent headaches    Current Assessment & Plan     S/p admission - and -  Currently taking all medications as prescribed  Patient reports only dull headache today          Chiari I malformation [facility])            Future Appointments   Date Time Provider Joce Dhaliwal   2022  1:30 PM  [facility] [address] [facility]   2022  8:30 AM TY Villalobos [facility]   2022 10:00 AM MD ANA Ro [facility]   9/25/2022  8:00 PM AN L&D ROOM AN L&D [address]   9/26/2022  6:00 AM AN L&D ROOM AN L&D [address]   9/30/2022  1:00 PM OBGYN NON STRESS MACHINE [facility]   9/30/2022  1:45 PM Brittany Jain MD [facility]   10/7/2022  1:30 PM OBGYN NON STRESS MACHINE [facility]   10/7/2022  2:15 PM Carol Dominguez MD [facility]   10/14/2022 12:30 PM OBGYN NON STRESS MACHINE [facility]   10/14/2022  1:15 PM Omi Coello MD [address], [place]  9/6/2022  1:43 PM

## 2022-09-07 ENCOUNTER — ROUTINE PRENATAL (OUTPATIENT)
Dept: PERINATAL CARE | Facility: OTHER | Age: 27
End: 2022-09-07
Payer: COMMERCIAL

## 2022-09-07 ENCOUNTER — TELEPHONE (OUTPATIENT)
Dept: NEUROLOGY | Facility: CLINIC | Age: 27
End: 2022-09-07

## 2022-09-07 VITALS
HEART RATE: 112 BPM | DIASTOLIC BLOOD PRESSURE: 72 MMHG | SYSTOLIC BLOOD PRESSURE: 130 MMHG | HEIGHT: 65 IN | BODY MASS INDEX: 39.15 KG/M2 | WEIGHT: 235 LBS

## 2022-09-07 DIAGNOSIS — Z3A.34 34 WEEKS GESTATION OF PREGNANCY: ICD-10-CM

## 2022-09-07 DIAGNOSIS — O13.3 GESTATIONAL HYPERTENSION, THIRD TRIMESTER: ICD-10-CM

## 2022-09-07 DIAGNOSIS — O99.213 MATERNAL OBESITY, ANTEPARTUM, THIRD TRIMESTER: Primary | ICD-10-CM

## 2022-09-07 DIAGNOSIS — Z3A.29 29 WEEKS GESTATION OF PREGNANCY: ICD-10-CM

## 2022-09-07 PROCEDURE — 76811 OB US DETAILED SNGL FETUS: CPT | Performed by: OBSTETRICS & GYNECOLOGY

## 2022-09-07 PROCEDURE — 99241 PR OFFICE CONSULTATION NEW/ESTAB PATIENT 15 MIN: CPT | Performed by: OBSTETRICS & GYNECOLOGY

## 2022-09-07 NOTE — TELEPHONE ENCOUNTER
GUIDO/PERSISTENT HA          NOTES:      Tera Carroll will need follow up in in 4 weeks with headache attending or advance practitioner  She will not require outpatient neurological testing  SCHEDULED: 10/24/22 @ 1PM W/[facility]Y IN [place]  ADDED PATIENT TO THE WAIT LIST  WILL SEND PATIENT A PATIENT MESSAGE VIA [employer] W/ALL APPOINTMENT DETAILS  LAST LOGGED IN 9/6/22  Reaching out to [facility] for a possible sooner appointment

## 2022-09-07 NOTE — LETTER
September 10, 2022     Dahiana Wakefield, [address] [id number]    Patient: Mingo Estrada   YOB: 1995   Date of Visit: 9/7/2022       Dear Dr Yas Barba: Thank you for referring Mingo Estrada to me for evaluation  Below are my notes for this consultation  If you have questions, please do not hesitate to call me  I look forward to following your patient along with you  Sincerely,        Mitul Ray MD        CC: No Recipients  Mitul Ray MD  9/5/2022 10:19 AM  Sign when Signing Visit  Please refer to the [facility] ultrasound report in Ob Procedures for additional information regarding today's visit

## 2022-09-07 NOTE — PATIENT INSTRUCTIONS
Kick Counts in Pregnancy   WHAT YOU NEED TO KNOW:   Kick counts measure how much your baby is moving in your womb  A kick from your baby can be felt as a twist, turn, swish, roll, or jab  It is common to feel your baby kicking at 26 to 28 weeks of pregnancy  You may feel your baby kick as early as 20 weeks of pregnancy  You may want to start counting at 28 weeks  DISCHARGE INSTRUCTIONS:   Contact your doctor immediately if:   You feel a change in the number of kicks or movements of your baby  You feel fewer than 10 kicks within 2 hours  You have questions or concerns about your baby's movements  Why measure kick counts:  Your baby's movement may provide information about your baby's health  He or she may move less, or not at all, if there are problems  Your baby may move less if he or she is not getting enough oxygen or nutrition from the placenta  Do not smoke while you are pregnant  Smoking decreases the amount of oxygen that gets to your baby  Talk to your healthcare provider if you need help to quit smoking  Tell your healthcare provider as soon as you feel a change in your baby's movements  When to measure kick counts:   Measure kick counts at the same time every day  Measure kick counts when your baby is awake and most active  Your baby may be most active in the evening  How to measure kick counts:  Check that your baby is awake before you measure kick counts  You can wake up your baby by lightly pushing on your belly, walking, or drinking something cold  Your healthcare provider may tell you different ways to measure kick counts  You may be told to do the following:  Use a chart or clock to keep track of the time you start and finish counting  Sit in a chair or lie on your left side  Place your hands on the largest part of your belly  Count until you reach 10 kicks  Write down how much time it takes to count 10 kicks  It may take 30 minutes to 2 hours to count 10 kicks  It should not take more than 2 hours to count 10 kicks  Follow up with your doctor as directed:  Write down your questions so you remember to ask them during your visits  © Copyright [employer] 2022 Information is for End User's use only and may not be sold, redistributed or otherwise used for commercial purposes  All illustrations and images included in CareNotes® are the copyrighted property of A D A M , Inc  or [facility] Marah Gunter   The above information is an  only  It is not intended as medical advice for individual conditions or treatments  Talk to your doctor, nurse or pharmacist before following any medical regimen to see if it is safe and effective for you

## 2022-09-13 LAB
DME PARACHUTE DELIVERY DATE REQUESTED: NORMAL
DME PARACHUTE ITEM DESCRIPTION: NORMAL
DME PARACHUTE ORDER STATUS: NORMAL
DME PARACHUTE SUPPLIER NAME: NORMAL
DME PARACHUTE SUPPLIER PHONE: NORMAL

## 2022-09-15 ENCOUNTER — APPOINTMENT (OUTPATIENT)
Dept: LAB | Facility: CLINIC | Age: 27
End: 2022-09-15
Payer: COMMERCIAL

## 2022-09-15 DIAGNOSIS — Z3A.31 31 WEEKS GESTATION OF PREGNANCY: ICD-10-CM

## 2022-09-15 LAB
ALBUMIN SERPL BCP-MCNC: 3.6 G/DL (ref 3.5–5)
ALP SERPL-CCNC: 100 U/L (ref 34–104)
ALT SERPL W P-5'-P-CCNC: 9 U/L (ref 7–52)
ANION GAP SERPL CALCULATED.3IONS-SCNC: 8 MMOL/L (ref 4–13)
AST SERPL W P-5'-P-CCNC: 10 U/L (ref 13–39)
BILIRUB SERPL-MCNC: 0.25 MG/DL (ref 0.2–1)
BUN SERPL-MCNC: 7 MG/DL (ref 5–25)
CALCIUM SERPL-MCNC: 10.4 MG/DL (ref 8.4–10.2)
CHLORIDE SERPL-SCNC: 104 MMOL/L (ref 96–108)
CO2 SERPL-SCNC: 23 MMOL/L (ref 21–32)
CREAT SERPL-MCNC: 0.6 MG/DL (ref 0.6–1.3)
CREAT UR-MCNC: 60 MG/DL
ERYTHROCYTE [DISTWIDTH] IN BLOOD BY AUTOMATED COUNT: 13.3 % (ref 11.6–15.1)
GFR SERPL CREATININE-BSD FRML MDRD: 127 ML/MIN/1.73SQ M
GLUCOSE SERPL-MCNC: 91 MG/DL (ref 65–140)
HCT VFR BLD AUTO: 37.5 % (ref 34.8–46.1)
HGB BLD-MCNC: 12.7 G/DL (ref 11.5–15.4)
MCH RBC QN AUTO: 29.6 PG (ref 26.8–34.3)
MCHC RBC AUTO-ENTMCNC: 33.9 G/DL (ref 31.4–37.4)
MCV RBC AUTO: 87 FL (ref 82–98)
PLATELET # BLD AUTO: 383 THOUSANDS/UL (ref 149–390)
PMV BLD AUTO: 10.4 FL (ref 8.9–12.7)
POTASSIUM SERPL-SCNC: 4.1 MMOL/L (ref 3.5–5.3)
PROT SERPL-MCNC: 6.6 G/DL (ref 6.4–8.4)
PROT UR-MCNC: 7 MG/DL
PROT/CREAT UR: 0.12 MG/G{CREAT} (ref 0–0.1)
RBC # BLD AUTO: 4.29 MILLION/UL (ref 3.81–5.12)
SODIUM SERPL-SCNC: 135 MMOL/L (ref 135–147)
WBC # BLD AUTO: 11.73 THOUSAND/UL (ref 4.31–10.16)

## 2022-09-15 PROCEDURE — 82570 ASSAY OF URINE CREATININE: CPT

## 2022-09-15 PROCEDURE — 85027 COMPLETE CBC AUTOMATED: CPT

## 2022-09-15 PROCEDURE — 80053 COMPREHEN METABOLIC PANEL: CPT

## 2022-09-15 PROCEDURE — 36415 COLL VENOUS BLD VENIPUNCTURE: CPT

## 2022-09-15 PROCEDURE — 84156 ASSAY OF PROTEIN URINE: CPT

## 2022-09-16 ENCOUNTER — ROUTINE PRENATAL (OUTPATIENT)
Dept: OBGYN CLINIC | Facility: CLINIC | Age: 27
End: 2022-09-16
Payer: COMMERCIAL

## 2022-09-16 VITALS
WEIGHT: 235.5 LBS | DIASTOLIC BLOOD PRESSURE: 68 MMHG | HEIGHT: 65 IN | BODY MASS INDEX: 39.24 KG/M2 | SYSTOLIC BLOOD PRESSURE: 138 MMHG

## 2022-09-16 DIAGNOSIS — R51.9 PERSISTENT HEADACHES: ICD-10-CM

## 2022-09-16 DIAGNOSIS — O13.3 GESTATIONAL HYPERTENSION, THIRD TRIMESTER: ICD-10-CM

## 2022-09-16 DIAGNOSIS — Z3A.34 34 WEEKS GESTATION OF PREGNANCY: ICD-10-CM

## 2022-09-16 DIAGNOSIS — Z34.93 THIRD TRIMESTER PREGNANCY: Primary | ICD-10-CM

## 2022-09-16 PROCEDURE — 59025 FETAL NON-STRESS TEST: CPT | Performed by: PHYSICIAN ASSISTANT

## 2022-09-16 PROCEDURE — PNV: Performed by: PHYSICIAN ASSISTANT

## 2022-09-16 NOTE — PROGRESS NOTES
Nst reactive and reassuring  Pt feeling well  Has IOL set for next Sunday  Pt had GBS done at L&D-negative  Good FM, no vb/lof, no n/v/ha, no edema, no smoking  All questions answered  Will plan cervical exam next week prior to IOL

## 2022-09-20 ENCOUNTER — ROUTINE PRENATAL (OUTPATIENT)
Dept: OBGYN CLINIC | Facility: CLINIC | Age: 27
End: 2022-09-20
Payer: COMMERCIAL

## 2022-09-20 VITALS
BODY MASS INDEX: 39.77 KG/M2 | SYSTOLIC BLOOD PRESSURE: 122 MMHG | WEIGHT: 239 LBS | DIASTOLIC BLOOD PRESSURE: 72 MMHG | HEART RATE: 103 BPM

## 2022-09-20 DIAGNOSIS — R51.9 PERSISTENT HEADACHES: ICD-10-CM

## 2022-09-20 DIAGNOSIS — Z34.93 PRENATAL CARE IN THIRD TRIMESTER: Primary | ICD-10-CM

## 2022-09-20 DIAGNOSIS — O13.3 GESTATIONAL HYPERTENSION, THIRD TRIMESTER: ICD-10-CM

## 2022-09-20 DIAGNOSIS — Z3A.36 36 WEEKS GESTATION OF PREGNANCY: ICD-10-CM

## 2022-09-20 PROCEDURE — 59025 FETAL NON-STRESS TEST: CPT | Performed by: STUDENT IN AN ORGANIZED HEALTH CARE EDUCATION/TRAINING PROGRAM

## 2022-09-20 PROCEDURE — PNV: Performed by: STUDENT IN AN ORGANIZED HEALTH CARE EDUCATION/TRAINING PROGRAM

## 2022-09-20 NOTE — PATIENT INSTRUCTIONS
Pregnancy at 28 to 38 Weeks   AMBULATORY CARE:   Changes happening with your body: You are considered full term at the beginning of 37 weeks  Your breathing may be easier if your baby has moved down into a head-down position  You may need to urinate more often because the baby may be pressing on your bladder  You may also feel more discomfort and get tired easily  Seek care immediately if:   You develop a severe headache that does not go away  You have new or increased vision changes, such as blurred or spotted vision  You have new or increased swelling in your face or hands  You have vaginal spotting or bleeding  Your water broke or you feel warm water gushing or trickling from your vagina  Call your obstetrician if:   You have more than 5 contractions in 1 hour  You notice any changes in your baby's movements  You have abdominal cramps, pressure, or tightening  You have a change in vaginal discharge  You have chills or a fever  You have vaginal itching, burning, or pain  You have yellow, green, white, or foul-smelling vaginal discharge  You have pain or burning when you urinate, less urine than usual, or pink or bloody urine  You have questions or concerns about your condition or care  How to care for yourself at this stage of your pregnancy:       Eat a variety of healthy foods  Healthy foods include fruits, vegetables, whole-grain breads, low-fat dairy foods, beans, lean meats, and fish  Drink liquids as directed  Ask how much liquid to drink each day and which liquids are best for you  Limit caffeine to less than 200 milligrams each day  Limit your intake of fish to 2 servings each week  Choose fish low in mercury such as canned light tuna, shrimp, salmon, cod, or tilapia  Do not  eat fish high in mercury such as swordfish, tilefish, adrianne mackerel, and shark  Take prenatal vitamins as directed    Your need for certain vitamins and minerals, such as folic acid, increases during pregnancy  Prenatal vitamins provide some of the extra vitamins and minerals you need  Prenatal vitamins may also help to decrease the risk of certain birth defects  Rest as needed  Put your feet up if you have swelling in your ankles and feet  Talk to your healthcare provider about exercise  Moderate exercise can help you stay fit  Your healthcare provider will help you plan an exercise program that is safe for you during pregnancy  Do not smoke  Smoking increases your risk of a miscarriage and other health problems during your pregnancy  Smoking can cause your baby to be born early or weigh less at birth  Ask your healthcare provider for information if you need help quitting  Do not drink alcohol  Alcohol passes from your body to your baby through the placenta  It can affect your baby's brain development and cause fetal alcohol syndrome (FAS)  FAS is a group of conditions that causes mental, behavior, and growth problems  Talk to your healthcare provider before you take any medicines  Many medicines may harm your baby if you take them when you are pregnant  Do not take any medicines, vitamins, herbs, or supplements without first talking to your healthcare provider  Never use illegal or street drugs (such as marijuana or cocaine) while you are pregnant  Safety tips during pregnancy:   Avoid hot tubs and saunas  Do not use a hot tub or sauna while you are pregnant, especially during your first trimester  Hot tubs and saunas may raise your baby's temperature and increase the risk of birth defects  Avoid toxoplasmosis  This is an infection caused by eating raw meat or being around infected cat feces  It can cause birth defects, miscarriages, and other problems  Wash your hands after you touch raw meat  Make sure any meat is well-cooked before you eat it  Avoid raw eggs and unpasteurized milk   Use gloves or ask someone else to clean your cat's litter box while you are pregnant  Ask your healthcare provider about travel  The most comfortable time to travel is during the second trimester  Ask your provider if you can travel after 36 weeks  You may not be able to travel in an airplane after 36 weeks  He or she may also recommend you avoid long road trips  Changes happening with your baby:  By 38 weeks, your baby may weigh between 6 and 9 pounds  Your baby may be about 14 inches long from the top of the head to the rump (baby's bottom)  Your baby hears well enough to know your voice  As your baby gets larger, you may feel fewer kicks and more stretching and rolling  Your baby may move into a head-down position  Your baby will also rest lower in your abdomen  What you need to know about prenatal care: Your healthcare provider will check your blood pressure and weight  You may also need the following:  A urine test  may also be done to check for sugar and protein  These can be signs of gestational diabetes or infection  Protein in your urine may also be a sign of preeclampsia  Preeclampsia is a condition that can develop during week 20 or later of your pregnancy  It causes high blood pressure, and it can cause problems with your kidneys and other organs  A gestational diabetes screen  may be done  Your healthcare provider may order either a 1-step or 2-step oral glucose tolerance test (OGTT)  1-step OGTT:  Your blood sugar level will be tested after you have not eaten for 8 hours (fasting)  You will then be given a glucose drink  Your level will be tested again 1 hour and 2 hours after you finish the drink  2-step OGTT:  You do not have to fast for the first part of the test  You will have the glucose drink at any time of day  Your blood sugar level will be checked 1 hour later  If your blood sugar is higher than a certain level, another test will be ordered  You will fast and your blood sugar level will be tested  You will have the glucose drink  Your blood will be tested again 1 hour, 2 hours, and 3 hours after you finish the glucose drink  A blood test  may be done to check for anemia (low iron level)  A Tdap vaccine  may be recommended by your healthcare provider  A group B strep test  is a test that is done to check for group B strep infection  Group B strep is a type of bacteria that may be found in the vagina or rectum  It can be passed to your baby during delivery if you have it  Your healthcare provider will take swab your vagina or rectum and send the sample to the lab for tests  Fundal height  is a measurement of your uterus to check your baby's growth  This number is usually the same as the number of weeks that you have been pregnant  Your healthcare provider may also check your baby's position  Your baby's heart rate  will be checked  Follow up with your obstetrician as directed:  Write down your questions so you remember to ask them during your visits  © Copyright [employer] 2022 Information is for End User's use only and may not be sold, redistributed or otherwise used for commercial purposes  All illustrations and images included in CareNotes® are the copyrighted property of [employer]  or [facility] Marah Gunter   The above information is an  only  It is not intended as medical advice for individual conditions or treatments  Talk to your doctor, nurse or pharmacist before following any medical regimen to see if it is safe and effective for you

## 2022-09-20 NOTE — PROGRESS NOTES
Deuce Ramsey is a 62-year-old  020 at 36 weeks and 3 days presenting for routine prenatal care an NST for the indication of gestational hypertension- patient denies any cramping, contractions, loss of fluid or vaginal bleeding  She endorses good fetal movement  Urine neg/neg  She continues to have headaches on and off- resolve with medications that she has been prescribed  She otherwise denies any other preeclamptic symptoms  Labs 09/15/2022 were within normal limits with no concern for progression to preeclampsia  NST with a baseline of 150, moderate variability, spontaneous 15 x 15 accelerations noted with no decelerations  There are no contractions on tocometry  SVE in office today is fingertip/60/-2, mid position and soft  GBS collected already and was negative- reviewed with patient today  Labor and preeclamptic precautions were reviewed  Induction of labor scheduled for 2022 at 37 weeks gestation for [facility]

## 2022-09-25 ENCOUNTER — HOSPITAL ENCOUNTER (INPATIENT)
Facility: HOSPITAL | Age: 27
LOS: 5 days | Discharge: HOME | End: 2022-09-30
Attending: OBSTETRICS & GYNECOLOGY | Admitting: OBSTETRICS & GYNECOLOGY
Payer: COMMERCIAL

## 2022-09-25 ENCOUNTER — HOSPITAL ENCOUNTER (OUTPATIENT)
Dept: LABOR AND DELIVERY | Facility: HOSPITAL | Age: 27
Discharge: HOME | End: 2022-09-25
Payer: COMMERCIAL

## 2022-09-25 DIAGNOSIS — Z98.891 STATUS POST PRIMARY LOW TRANSVERSE CESAREAN SECTION: ICD-10-CM

## 2022-09-25 DIAGNOSIS — Z3A.37 37 WEEKS GESTATION OF PREGNANCY: ICD-10-CM

## 2022-09-25 DIAGNOSIS — O36.8390 NON-REASSURING FETAL HEART RATE OR RHYTHM AFFECTING MANAGEMENT OF MOTHER: ICD-10-CM

## 2022-09-25 PROBLEM — Z3A.36 36 WEEKS GESTATION OF PREGNANCY: Status: RESOLVED | Noted: 2022-08-19 | Resolved: 2022-09-25

## 2022-09-25 PROBLEM — Z34.90 ENCOUNTER FOR INDUCTION OF LABOR: Status: ACTIVE | Noted: 2022-09-25

## 2022-09-25 LAB
ABO GROUP BLD: NORMAL
ALBUMIN SERPL BCP-MCNC: 3.5 G/DL (ref 3.5–5)
ALP SERPL-CCNC: 107 U/L (ref 34–104)
ALT SERPL W P-5'-P-CCNC: 8 U/L (ref 7–52)
ANION GAP SERPL CALCULATED.3IONS-SCNC: 9 MMOL/L (ref 4–13)
AST SERPL W P-5'-P-CCNC: 10 U/L (ref 13–39)
BILIRUB SERPL-MCNC: 0.27 MG/DL (ref 0.2–1)
BLD GP AB SCN SERPL QL: NEGATIVE
BUN SERPL-MCNC: 6 MG/DL (ref 5–25)
CALCIUM SERPL-MCNC: 10.3 MG/DL (ref 8.4–10.2)
CHLORIDE SERPL-SCNC: 105 MMOL/L (ref 96–108)
CO2 SERPL-SCNC: 21 MMOL/L (ref 21–32)
CREAT SERPL-MCNC: 0.5 MG/DL (ref 0.6–1.3)
ERYTHROCYTE [DISTWIDTH] IN BLOOD BY AUTOMATED COUNT: 13.3 % (ref 11.6–15.1)
GFR SERPL CREATININE-BSD FRML MDRD: 134 ML/MIN/1.73SQ M
GLUCOSE SERPL-MCNC: 97 MG/DL (ref 65–140)
HCT VFR BLD AUTO: 35.9 % (ref 34.8–46.1)
HGB BLD-MCNC: 12.1 G/DL (ref 11.5–15.4)
MCH RBC QN AUTO: 28.8 PG (ref 26.8–34.3)
MCHC RBC AUTO-ENTMCNC: 33.7 G/DL (ref 31.4–37.4)
MCV RBC AUTO: 86 FL (ref 82–98)
PLATELET # BLD AUTO: 256 THOUSANDS/UL (ref 149–390)
PMV BLD AUTO: 10.7 FL (ref 8.9–12.7)
POTASSIUM SERPL-SCNC: 3.9 MMOL/L (ref 3.5–5.3)
PROT SERPL-MCNC: 6.4 G/DL (ref 6.4–8.4)
RBC # BLD AUTO: 4.2 MILLION/UL (ref 3.81–5.12)
RH BLD: POSITIVE
SODIUM SERPL-SCNC: 135 MMOL/L (ref 135–147)
SPECIMEN EXPIRATION DATE: NORMAL
WBC # BLD AUTO: 12.42 THOUSAND/UL (ref 4.31–10.16)

## 2022-09-25 PROCEDURE — 86592 SYPHILIS TEST NON-TREP QUAL: CPT | Performed by: OBSTETRICS & GYNECOLOGY

## 2022-09-25 PROCEDURE — 4A1HXCZ MONITORING OF PRODUCTS OF CONCEPTION, CARDIAC RATE, EXTERNAL APPROACH: ICD-10-PCS | Performed by: STUDENT IN AN ORGANIZED HEALTH CARE EDUCATION/TRAINING PROGRAM

## 2022-09-25 PROCEDURE — NC001 PR NO CHARGE: Performed by: OBSTETRICS & GYNECOLOGY

## 2022-09-25 PROCEDURE — 86850 RBC ANTIBODY SCREEN: CPT | Performed by: OBSTETRICS & GYNECOLOGY

## 2022-09-25 PROCEDURE — 86901 BLOOD TYPING SEROLOGIC RH(D): CPT | Performed by: OBSTETRICS & GYNECOLOGY

## 2022-09-25 PROCEDURE — 85027 COMPLETE CBC AUTOMATED: CPT | Performed by: OBSTETRICS & GYNECOLOGY

## 2022-09-25 PROCEDURE — 86900 BLOOD TYPING SEROLOGIC ABO: CPT | Performed by: OBSTETRICS & GYNECOLOGY

## 2022-09-25 PROCEDURE — 80053 COMPREHEN METABOLIC PANEL: CPT | Performed by: OBSTETRICS & GYNECOLOGY

## 2022-09-25 RX ORDER — ACETAMINOPHEN 325 MG/1
650 TABLET ORAL EVERY 6 HOURS PRN
Status: DISCONTINUED | OUTPATIENT
Start: 2022-09-25 | End: 2022-09-27

## 2022-09-25 RX ORDER — SODIUM CHLORIDE, SODIUM LACTATE, POTASSIUM CHLORIDE, CALCIUM CHLORIDE 600; 310; 30; 20 MG/100ML; MG/100ML; MG/100ML; MG/100ML
125 INJECTION, SOLUTION INTRAVENOUS CONTINUOUS
Status: DISCONTINUED | OUTPATIENT
Start: 2022-09-25 | End: 2022-09-27

## 2022-09-25 RX ORDER — CYPROHEPTADINE HYDROCHLORIDE 4 MG/1
4 TABLET ORAL 3 TIMES DAILY PRN
Status: DISCONTINUED | OUTPATIENT
Start: 2022-09-25 | End: 2022-09-27

## 2022-09-25 RX ADMIN — SODIUM CHLORIDE, SODIUM LACTATE, POTASSIUM CHLORIDE, AND CALCIUM CHLORIDE 125 ML/HR: .6; .31; .03; .02 INJECTION, SOLUTION INTRAVENOUS at 23:43

## 2022-09-25 RX ADMIN — Medication 25 MCG: at 23:39

## 2022-09-26 ENCOUNTER — ANESTHESIA EVENT (INPATIENT)
Dept: LABOR AND DELIVERY | Facility: HOSPITAL | Age: 27
End: 2022-09-26
Payer: COMMERCIAL

## 2022-09-26 ENCOUNTER — ANESTHESIA (INPATIENT)
Dept: LABOR AND DELIVERY | Facility: HOSPITAL | Age: 27
End: 2022-09-26
Payer: COMMERCIAL

## 2022-09-26 LAB
CREAT UR-MCNC: 77.9 MG/DL
PROT UR-MCNC: 11 MG/DL
PROT/CREAT UR: 0.14 MG/G{CREAT} (ref 0–0.1)
RPR SER QL: NORMAL

## 2022-09-26 PROCEDURE — 84156 ASSAY OF PROTEIN URINE: CPT | Performed by: OBSTETRICS & GYNECOLOGY

## 2022-09-26 PROCEDURE — 82570 ASSAY OF URINE CREATININE: CPT | Performed by: OBSTETRICS & GYNECOLOGY

## 2022-09-26 RX ORDER — LIDOCAINE HYDROCHLORIDE AND EPINEPHRINE 15; 5 MG/ML; UG/ML
INJECTION, SOLUTION EPIDURAL AS NEEDED
Status: DISCONTINUED | OUTPATIENT
Start: 2022-09-26 | End: 2022-09-27

## 2022-09-26 RX ORDER — OXYTOCIN/RINGER'S LACTATE 30/500 ML
1-30 PLASTIC BAG, INJECTION (ML) INTRAVENOUS
Status: DISCONTINUED | OUTPATIENT
Start: 2022-09-26 | End: 2022-09-27

## 2022-09-26 RX ORDER — LIDOCAINE HYDROCHLORIDE 10 MG/ML
INJECTION, SOLUTION EPIDURAL; INFILTRATION; INTRACAUDAL; PERINEURAL AS NEEDED
Status: DISCONTINUED | OUTPATIENT
Start: 2022-09-26 | End: 2022-09-27

## 2022-09-26 RX ORDER — ONDANSETRON 2 MG/ML
INJECTION INTRAMUSCULAR; INTRAVENOUS
Status: COMPLETED
Start: 2022-09-26 | End: 2022-09-26

## 2022-09-26 RX ORDER — BUTORPHANOL TARTRATE 1 MG/ML
1 INJECTION, SOLUTION INTRAMUSCULAR; INTRAVENOUS
Status: DISCONTINUED | OUTPATIENT
Start: 2022-09-26 | End: 2022-09-27

## 2022-09-26 RX ORDER — TRISODIUM CITRATE DIHYDRATE AND CITRIC ACID MONOHYDRATE 500; 334 MG/5ML; MG/5ML
30 SOLUTION ORAL ONCE
Status: DISCONTINUED | OUTPATIENT
Start: 2022-09-26 | End: 2022-09-27

## 2022-09-26 RX ORDER — METOCLOPRAMIDE HYDROCHLORIDE 5 MG/ML
10 INJECTION INTRAMUSCULAR; INTRAVENOUS ONCE AS NEEDED
Status: COMPLETED | OUTPATIENT
Start: 2022-09-26 | End: 2022-09-26

## 2022-09-26 RX ORDER — ONDANSETRON 2 MG/ML
4 INJECTION INTRAMUSCULAR; INTRAVENOUS ONCE
Status: COMPLETED | OUTPATIENT
Start: 2022-09-26 | End: 2022-09-26

## 2022-09-26 RX ADMIN — SODIUM CHLORIDE, SODIUM LACTATE, POTASSIUM CHLORIDE, AND CALCIUM CHLORIDE 125 ML/HR: .6; .31; .03; .02 INJECTION, SOLUTION INTRAVENOUS at 22:57

## 2022-09-26 RX ADMIN — SODIUM CHLORIDE, SODIUM LACTATE, POTASSIUM CHLORIDE, AND CALCIUM CHLORIDE 125 ML/HR: .6; .31; .03; .02 INJECTION, SOLUTION INTRAVENOUS at 07:29

## 2022-09-26 RX ADMIN — LIDOCAINE HYDROCHLORIDE AND EPINEPHRINE 3 ML: 15; 5 INJECTION, SOLUTION EPIDURAL at 23:48

## 2022-09-26 RX ADMIN — Medication 25 MCG: at 05:04

## 2022-09-26 RX ADMIN — ONDANSETRON 4 MG: 2 INJECTION INTRAMUSCULAR; INTRAVENOUS at 00:58

## 2022-09-26 RX ADMIN — LIDOCAINE HYDROCHLORIDE 3 MG: 10 INJECTION, SOLUTION EPIDURAL; INFILTRATION; INTRACAUDAL; PERINEURAL at 23:42

## 2022-09-26 RX ADMIN — LIDOCAINE HYDROCHLORIDE AND EPINEPHRINE 2 ML: 15; 5 INJECTION, SOLUTION EPIDURAL at 23:50

## 2022-09-26 RX ADMIN — BUTORPHANOL TARTRATE 1 MG: 1 INJECTION, SOLUTION INTRAMUSCULAR; INTRAVENOUS at 04:16

## 2022-09-26 RX ADMIN — METOCLOPRAMIDE 10 MG: 5 INJECTION, SOLUTION INTRAMUSCULAR; INTRAVENOUS at 04:16

## 2022-09-26 RX ADMIN — ROPIVACAINE HYDROCHLORIDE: 2 INJECTION, SOLUTION EPIDURAL; INFILTRATION at 23:55

## 2022-09-26 RX ADMIN — SODIUM CHLORIDE, SODIUM LACTATE, POTASSIUM CHLORIDE, AND CALCIUM CHLORIDE 125 ML/HR: .6; .31; .03; .02 INJECTION, SOLUTION INTRAVENOUS at 15:08

## 2022-09-26 RX ADMIN — Medication 2 MILLI-UNITS/MIN: at 09:06

## 2022-09-26 NOTE — OB LABOR/OXYTOCIN SAFETY PROGRESS
Labor Progress Note - Angelo Aw 24 y o  female MRN: [id number]    Unit/Bed#: -01 Encounter: [id number]       Contraction Frequency (minutes): 2-4  Contraction Quality: Mild  Tachysystole: No   Cervical Dilation: 3-4        Cervical Effacement: 60  Fetal Station: -3  Baseline Rate: 130 bpm  Fetal Heart Rate: 174 BPM  FHR Category: Category I               Vital Signs:   Vitals:    09/26/22 0600   BP: 132/77   Pulse: (!) 111   Resp:    Temp:        Notes/comments: Patients crocker fell out  SVE is now 3 5/60/-3   Plan is to start pitocin once patient is 4hrs post cytotec ( 3417VO )    Dr Don Azul aware         Reynaldo Newsome MD 9/26/2022 6:29 AM

## 2022-09-26 NOTE — ASSESSMENT & PLAN NOTE
CBC, CMP wnl, P:C 5 92  Systolic (55RGE), HWI:444 , Min:104 , GZS:861     Diastolic (32THO), OIK:12, Min:59, Max:79

## 2022-09-26 NOTE — PLAN OF CARE
Problem: Knowledge Deficit  Goal: Verbalizes understanding of labor plan  Description: Assess patient/family/caregiver's baseline knowledge level and ability to understand information  Provide education via patient/family/caregiver's preferred learning method at appropriate level of understanding  1  Provide teaching at level of understanding  2  Provide teaching via preferred learning method(s)  Outcome: Progressing  Goal: Patient/family/caregiver demonstrates understanding of disease process, treatment plan, medications, and discharge instructions  Description: Complete learning assessment and assess knowledge base  Interventions:  - Provide teaching at level of understanding  - Provide teaching via preferred learning methods  Outcome: Progressing     Problem: Labor & Delivery  Goal: Manages discomfort  Description: Assess and monitor for signs and symptoms of discomfort  Assess patient's pain level regularly and per hospital policy  Administer medications as ordered  Support use of nonpharmacological methods to help control pain such as distraction, imagery, relaxation, and application of heat and cold  Collaborate with interdisciplinary team and patient to determine appropriate pain management plan  1  Include patient in decisions related to comfort  2  Offer non-pharmacological pain management interventions  3  Report ineffective pain management to physician  Outcome: Progressing  Goal: Patient vital signs are stable  Description: 1  Assess vital signs - vaginal delivery    Outcome: Progressing     Problem: PAIN - ADULT  Goal: Verbalizes/displays adequate comfort level or baseline comfort level  Description: Interventions:  - Encourage patient to monitor pain and request assistance  - Assess pain using appropriate pain scale  - Administer analgesics based on type and severity of pain and evaluate response  - Implement non-pharmacological measures as appropriate and evaluate response  - Consider cultural and social influences on pain and pain management  - Notify physician/advanced practitioner if interventions unsuccessful or patient reports new pain  Outcome: Progressing     Problem: INFECTION - ADULT  Goal: Absence or prevention of progression during hospitalization  Description: INTERVENTIONS:  - Assess and monitor for signs and symptoms of infection  - Monitor lab/diagnostic results  - Monitor all insertion sites, i e  indwelling lines, tubes, and drains  - Monitor endotracheal if appropriate and nasal secretions for changes in amount and color  - [place] appropriate cooling/warming therapies per order  - Administer medications as ordered  - Instruct and encourage patient and family to use good hand hygiene technique  - Identify and instruct in appropriate isolation precautions for identified infection/condition  Outcome: Progressing  Goal: Absence of fever/infection during neutropenic period  Description: INTERVENTIONS:  - Monitor WBC    Outcome: Progressing     Problem: SAFETY ADULT  Goal: Patient will remain free of falls  Description: INTERVENTIONS:  - Educate patient/family on patient safety including physical limitations  - Instruct patient to call for assistance with activity   - Consult OT/PT to assist with strengthening/mobility   - Keep Call bell within reach  - Keep bed low and locked with side rails adjusted as appropriate  - Keep care items and personal belongings within reach  - Initiate and maintain comfort rounds  - Make Fall Risk Sign visible to staff  - Apply yellow socks and bracelet for high fall risk patients  - Consider moving patient to room near nurses station  Outcome: Progressing  Goal: Maintain or return to baseline ADL function  Description: INTERVENTIONS:  -  Assess patient's ability to carry out ADLs; assess patient's baseline for ADL function and identify physical deficits which impact ability to perform ADLs (bathing, care of mouth/teeth, toileting, grooming, dressing, etc )  - Assess/evaluate cause of self-care deficits   - Assess range of motion  - Assess patient's mobility; develop plan if impaired  - Assess patient's need for assistive devices and provide as appropriate  - Encourage maximum independence but intervene and supervise when necessary  - Involve family in performance of ADLs  - Assess for home care needs following discharge   - Consider OT consult to assist with ADL evaluation and planning for discharge  - Provide patient education as appropriate  Outcome: Progressing  Goal: Maintains/Returns to pre admission functional level  Description: INTERVENTIONS:  - Perform BMAT or MOVE assessment daily    - Set and communicate daily mobility goal to care team and patient/family/caregiver     - Collaborate with rehabilitation services on mobility goals if consulted  - Out of bed for toileting  - Record patient progress and toleration of activity level   Outcome: Progressing     Problem: DISCHARGE PLANNING  Goal: Discharge to home or other facility with appropriate resources  Description: INTERVENTIONS:  - Identify barriers to discharge w/patient and caregiver  - Arrange for needed discharge resources and transportation as appropriate  - Identify discharge learning needs (meds, wound care, etc )  - Arrange for interpretive services to assist at discharge as needed  - Refer to Case Management Department for coordinating discharge planning if the patient needs post-hospital services based on physician/advanced practitioner order or complex needs related to functional status, cognitive ability, or social support system  Outcome: Progressing

## 2022-09-26 NOTE — OB LABOR/OXYTOCIN SAFETY PROGRESS
Labor Progress Note - Elias Ortega 24 y o  female MRN: [id number]    Unit/Bed#: -01 Encounter: [id number]       Contraction Frequency (minutes): irregular  Contraction Quality: Mild  Tachysystole: No   Cervical Dilation: Fingertip        Cervical Effacement: 50  Fetal Station: -3  Baseline Rate: 135 bpm  Fetal Heart Rate: 142 BPM  FHR Category: Category I               Vital Signs:   Vitals:    09/26/22 0400   BP: 148/88   Pulse: 99   Resp:    Temp:        Notes/comments: Patient is comfortable supine  Mejia is still in place and under tension  Contractions were irregular, second cytotec placed                 Raghavendra Colbert MD 9/26/2022 5:05 AM Received report from Karey Noriega

## 2022-09-26 NOTE — OB LABOR/OXYTOCIN SAFETY PROGRESS
Oxytocin Safety Progress Check Note - Dimple Petties 24 y o  female MRN: [id number]    Unit/Bed#: -01 Encounter: [id number]    Dose (manfred-units/min) Oxytocin: 18 manfred-units/min  Contraction Frequency (minutes): 2-3  Contraction Quality: Mild  Tachysystole: No   Cervical Dilation: 4        Cervical Effacement: 60  Fetal Station: -2  Baseline Rate: 155 bpm  Fetal Heart Rate: 158 BPM  FHR Category: Category I               Vital Signs:   Vitals:    09/26/22 1638   BP: 136/83   Pulse: (!) 107   Resp:    Temp:        Notes/comments:     Cervix unchanged at 4/60/-2, patient feeling some lower back discomfort but no strong contractions  Will do a pitocin break, give patient snack, and restart   FHT category I     D/w Dr Stan Juarez MD 9/26/2022 4:49 PM

## 2022-09-26 NOTE — H&P
H & P- Obstetrics   Terrance Osei 24 y o  female MRN: [id number]  Unit/Bed#: -01 Encounter: [id number]    Assessment: 24 y o   at 37w1d admitted for induction of labor due to gestational hypertension       SVE: 0 5/50/-3  FHT: reassuring and reactive   EFW: 48%ile ; Vertex confirmed by US  GBS status: negative   Postpartum contraception plan: undecided    Plan:   37 weeks gestation of pregnancy  Assessment & Plan  Admit  IV Fluids  CBC, RPR, T&S  Anlagesia: per maternal request  Induction: Mejia/ cytotec    Hx of migraines  Assessment & Plan  Monitor symptoms    Gestational hypertension, third trimester  Assessment & Plan  CBC, CMP wnl, P:C 1 99    Systolic (93IMJ), OEU:804 , Min:113 , RUQ:199     Diastolic (89BOK), UUX:59, Min:57, Max:94            Discussed case and plan w/ Dr Jeromy Pena       Chief Complaint: none    HPI: Terrance Osei is a 24 y o  [id number] with an FREDY of 10/15/2022, by Last Menstrual Period at 42w4d who is being admitted for induction of labor due to gestational hypertension   She denies having uterine contractions, has no LOF, and reports no VB  She states she has felt good VANESA Haley Patient Active Problem List   Diagnosis    37 weeks gestation of pregnancy    Gestational hypertension, third trimester    Hx of migraines    Persistent headaches    Chiari I malformation ([facility], [place] [address] )    Encounter for induction of labor       Baby complications/comments: none    Review of Systems   Constitutional: Negative for chills and fever  Eyes: Negative for photophobia and visual disturbance  Respiratory: Negative for shortness of breath  Cardiovascular: Negative for chest pain  Gastrointestinal: Negative for abdominal pain, diarrhea, nausea and vomiting  Genitourinary: Negative for dysuria         OB Hx:  OB History    Para Term  AB Living   3       2     SAB IAB Ectopic Multiple Live Births   2              # Outcome Date GA Lbr Sadiq/2nd Weight Sex Delivery Anes PTL Lv   3 Current            2 SAB 2019           1 SAB 2017               Past Medical Hx:  Past Medical History:   Diagnosis Date    Migraine        Past Surgical hx:  Past Surgical History:   Procedure Laterality Date    TONSILLECTOMY      WISDOM TOOTH EXTRACTION         Social Hx:  Alcohol use: no  Tobacco use: no  Other substance use: no    Other: none    Allergies   Allergen Reactions    Latex Hives and Rash    Adhesive [Medical Tape] Rash     Redness, rash, possible Latex allergy as well       Medications Prior to Admission   Medication    cyclobenzaprine (FLEXERIL) 10 mg tablet    cyproheptadine (PERIACTIN) 4 mg tablet    magnesium oxide (MAG-OX) 400 mg    metoclopramide (Reglan) 10 mg tablet    Prenatal Vit-Fe Fumarate-FA (PRENATAL VITAMIN PO)    acetaminophen (TYLENOL) 325 mg tablet    Riboflavin 400 MG CAPS       Objective:  Temp:  [97 8 °F (36 6 °C)] 97 8 °F (36 6 °C)  HR:  [] 121  Resp:  [18] 18  BP: (113-148)/(57-94) 143/72  Body mass index is 39 77 kg/m²  Physical Exam:  Physical Exam  Constitutional:       Appearance: Normal appearance  Genitourinary:      Vulva and rectum normal    HENT:      Head: Normocephalic and atraumatic  Nose: Nose normal    Eyes:      Extraocular Movements: Extraocular movements intact  Cardiovascular:      Rate and Rhythm: Normal rate and regular rhythm  Pulses: Normal pulses  Heart sounds: Normal heart sounds  Pulmonary:      Effort: Pulmonary effort is normal       Breath sounds: Normal breath sounds  Abdominal:      General: There is distension (non-tender on palpation)  Palpations: Abdomen is soft  Musculoskeletal:         General: Normal range of motion  Cervical back: Normal range of motion  Neurological:      General: No focal deficit present  Mental Status: She is alert  Skin:     General: Skin is warm and dry     Psychiatric:         Mood and Affect: Mood normal             FHT: reactive and reassuring  Baseline Rate: 140 bpm  Variability: Moderate 6-25 bpm  Accelerations: 15 x 15 or greater, At variable times  Decelerations: None  FHR Category: Category I    TOCO: no contractions noted  Contraction Frequency (minutes):  (difficult to trace due to maternal position)  Contraction Duration (seconds): 40-80  Contraction Quality: Mild    Lab Results   Component Value Date    WBC 12 42 (H) 09/25/2022    HGB 12 1 09/25/2022    HCT 35 9 09/25/2022     09/25/2022     Lab Results   Component Value Date    K 3 9 09/25/2022     09/25/2022    CO2 21 09/25/2022    BUN 6 09/25/2022    CREATININE 0 50 (L) 09/25/2022    AST 10 (L) 09/25/2022    ALT 8 09/25/2022     Prenatal Labs: Reviewed      Blood type: A positive   Antibody: negative  GBS: negative   HIV: negative   Rubella: Immune  VDRL/RPR: Non reactive  HBsAg: Negative  Chlamydia: Negative  Gonorrhea: Negative  Diabetes 1 hour screen: 83/98/105  3 hour glucose: none  Platelets: 495  Hgb: 12 1  >2 Midnights  INPATIENT     Signature/Title: Cathy Moreno MD  Date: 9/26/2022  Time: 9:01 AM

## 2022-09-26 NOTE — OB LABOR/OXYTOCIN SAFETY PROGRESS
Oxytocin Safety Progress Check Note - Jenni Perez 24 y o  female MRN: [id number]    Unit/Bed#: -01 Encounter: [id number]    Dose (manfred-units/min) Oxytocin: 8 manfred-units/min  Contraction Frequency (minutes): 2-3 5  Contraction Quality: Mild  Tachysystole: No   Cervical Dilation: 4        Cervical Effacement: 60  Fetal Station: -2  Baseline Rate: 145 bpm  Fetal Heart Rate: 149 BPM  FHR Category: Category I               Vital Signs:   Vitals:    09/26/22 1138   BP: 126/75   Pulse: (!) 106   Resp:    Temp:        Notes/comments: SVE as above  FHT Cat I  Patient comfortable  Continue pit titration          Elvin Sanchez MD 9/26/2022 12:13 PM

## 2022-09-26 NOTE — OB LABOR/OXYTOCIN SAFETY PROGRESS
Oxytocin Safety Progress Check Note - Anahy Bones 24 y o  female MRN: [id number]    Unit/Bed#: -01 Encounter: [id number]    Dose (manfred-units/min) Oxytocin: 12 manfred-units/min  Contraction Frequency (minutes): 2 5-5  Contraction Quality: Mild  Tachysystole: No   Cervical Dilation: 4        Cervical Effacement: 60  Fetal Station: -2  Baseline Rate: 145 bpm  Fetal Heart Rate: 152 BPM  FHR Category: Category I               Vital Signs:   Vitals:    09/26/22 1423   BP: 120/59   Pulse: (!) 109   Resp:    Temp:        Notes/comments: SVE deferred for now  FHT Cat I with baseline 145bpm, moderate variability, 15x15 accels, no decels  BP normal  Patient comfortable  Continue pit titration           Britney Acosta MD 9/26/2022 2:46 PM

## 2022-09-27 PROBLEM — Z34.90 ENCOUNTER FOR INDUCTION OF LABOR: Status: RESOLVED | Noted: 2022-09-25 | Resolved: 2022-09-27

## 2022-09-27 PROBLEM — O13.9 GESTATIONAL HYPERTENSION: Status: ACTIVE | Noted: 2022-08-19

## 2022-09-27 PROBLEM — Z98.891 STATUS POST PRIMARY LOW TRANSVERSE CESAREAN SECTION: Status: ACTIVE | Noted: 2022-08-19

## 2022-09-27 LAB
BASE EXCESS BLDCOA CALC-SCNC: -3.9 MMOL/L (ref 3–11)
BASE EXCESS BLDCOV CALC-SCNC: -3 MMOL/L (ref 1–9)
HCO3 BLDCOA-SCNC: 23.2 MMOL/L (ref 17.3–27.3)
HCO3 BLDCOV-SCNC: 23.2 MMOL/L (ref 12.2–28.6)
O2 CT VFR BLDCOA CALC: 6.2 ML/DL
OXYHGB MFR BLDCOA: 28.9 %
OXYHGB MFR BLDCOV: 31.7 %
PCO2 BLDCOA: 50 MM[HG] (ref 30–60)
PCO2 BLDCOV: 45.4 MM HG (ref 27–43)
PH BLDCOA: 7.29 [PH] (ref 7.23–7.43)
PH BLDCOV: 7.33 [PH] (ref 7.19–7.49)
PO2 BLDCOA: 15.5 MM HG (ref 5–25)
PO2 BLDCOV: 15.4 MM HG (ref 15–45)
SAO2 % BLDCOV: 6.8 ML/DL

## 2022-09-27 PROCEDURE — 82805 BLOOD GASES W/O2 SATURATION: CPT | Performed by: STUDENT IN AN ORGANIZED HEALTH CARE EDUCATION/TRAINING PROGRAM

## 2022-09-27 PROCEDURE — 99024 POSTOP FOLLOW-UP VISIT: CPT | Performed by: STUDENT IN AN ORGANIZED HEALTH CARE EDUCATION/TRAINING PROGRAM

## 2022-09-27 PROCEDURE — NC001 PR NO CHARGE: Performed by: STUDENT IN AN ORGANIZED HEALTH CARE EDUCATION/TRAINING PROGRAM

## 2022-09-27 PROCEDURE — 10H07YZ INSERTION OF OTHER DEVICE INTO PRODUCTS OF CONCEPTION, VIA NATURAL OR ARTIFICIAL OPENING: ICD-10-PCS | Performed by: STUDENT IN AN ORGANIZED HEALTH CARE EDUCATION/TRAINING PROGRAM

## 2022-09-27 RX ORDER — NALOXONE HYDROCHLORIDE 0.4 MG/ML
0.1 INJECTION, SOLUTION INTRAMUSCULAR; INTRAVENOUS; SUBCUTANEOUS
Status: ACTIVE | OUTPATIENT
Start: 2022-09-27 | End: 2022-09-28

## 2022-09-27 RX ORDER — HYDROMORPHONE HCL/PF 1 MG/ML
0.5 SYRINGE (ML) INJECTION
Status: DISCONTINUED | OUTPATIENT
Start: 2022-09-27 | End: 2022-09-27

## 2022-09-27 RX ORDER — IBUPROFEN 600 MG/1
600 TABLET ORAL EVERY 6 HOURS
Status: DISCONTINUED | OUTPATIENT
Start: 2022-09-28 | End: 2022-09-30 | Stop reason: HOSPADM

## 2022-09-27 RX ORDER — NALBUPHINE HCL 10 MG/ML
10 AMPUL (ML) INJECTION
Status: DISCONTINUED | OUTPATIENT
Start: 2022-09-27 | End: 2022-09-30 | Stop reason: HOSPADM

## 2022-09-27 RX ORDER — SODIUM CHLORIDE, SODIUM LACTATE, POTASSIUM CHLORIDE, CALCIUM CHLORIDE 600; 310; 30; 20 MG/100ML; MG/100ML; MG/100ML; MG/100ML
INJECTION, SOLUTION INTRAVENOUS CONTINUOUS PRN
Status: DISCONTINUED | OUTPATIENT
Start: 2022-09-27 | End: 2022-09-27

## 2022-09-27 RX ORDER — LIDOCAINE HYDROCHLORIDE AND EPINEPHRINE 20; 5 MG/ML; UG/ML
INJECTION, SOLUTION EPIDURAL; INFILTRATION; INTRACAUDAL; PERINEURAL AS NEEDED
Status: DISCONTINUED | OUTPATIENT
Start: 2022-09-27 | End: 2022-09-27

## 2022-09-27 RX ORDER — ENOXAPARIN SODIUM 100 MG/ML
40 INJECTION SUBCUTANEOUS DAILY
Status: DISCONTINUED | OUTPATIENT
Start: 2022-09-27 | End: 2022-09-30 | Stop reason: HOSPADM

## 2022-09-27 RX ORDER — ACETAMINOPHEN 325 MG/1
650 TABLET ORAL EVERY 6 HOURS PRN
Status: DISCONTINUED | OUTPATIENT
Start: 2022-09-27 | End: 2022-09-27 | Stop reason: SDUPTHER

## 2022-09-27 RX ORDER — MORPHINE SULFATE 1 MG/ML
INJECTION, SOLUTION EPIDURAL; INTRATHECAL; INTRAVENOUS AS NEEDED
Status: DISCONTINUED | OUTPATIENT
Start: 2022-09-27 | End: 2022-09-27

## 2022-09-27 RX ORDER — ONDANSETRON 2 MG/ML
4 INJECTION INTRAMUSCULAR; INTRAVENOUS EVERY 4 HOURS PRN
Status: COMPLETED | OUTPATIENT
Start: 2022-09-27 | End: 2022-09-27

## 2022-09-27 RX ORDER — CEFAZOLIN SODIUM 2 G/50ML
2000 SOLUTION INTRAVENOUS ONCE
Status: COMPLETED | OUTPATIENT
Start: 2022-09-27 | End: 2022-09-27

## 2022-09-27 RX ORDER — OXYCODONE HYDROCHLORIDE 5 MG/1
5 TABLET ORAL EVERY 4 HOURS PRN
Status: DISCONTINUED | OUTPATIENT
Start: 2022-09-27 | End: 2022-09-30 | Stop reason: HOSPADM

## 2022-09-27 RX ORDER — SIMETHICONE 80 MG
80 TABLET,CHEWABLE ORAL 4 TIMES DAILY PRN
Status: DISCONTINUED | OUTPATIENT
Start: 2022-09-27 | End: 2022-09-30 | Stop reason: HOSPADM

## 2022-09-27 RX ORDER — FENTANYL CITRATE/PF 50 MCG/ML
25 SYRINGE (ML) INJECTION
Status: DISCONTINUED | OUTPATIENT
Start: 2022-09-27 | End: 2022-09-27

## 2022-09-27 RX ORDER — OXYTOCIN/RINGER'S LACTATE 30/500 ML
PLASTIC BAG, INJECTION (ML) INTRAVENOUS CONTINUOUS PRN
Status: DISCONTINUED | OUTPATIENT
Start: 2022-09-27 | End: 2022-09-27

## 2022-09-27 RX ORDER — DOCUSATE SODIUM 100 MG/1
100 CAPSULE, LIQUID FILLED ORAL 2 TIMES DAILY
Status: DISCONTINUED | OUTPATIENT
Start: 2022-09-27 | End: 2022-09-30 | Stop reason: HOSPADM

## 2022-09-27 RX ORDER — DEXAMETHASONE SODIUM PHOSPHATE 10 MG/ML
8 INJECTION, SOLUTION INTRAMUSCULAR; INTRAVENOUS ONCE AS NEEDED
Status: ACTIVE | OUTPATIENT
Start: 2022-09-27 | End: 2022-09-28

## 2022-09-27 RX ORDER — HYDROMORPHONE HCL/PF 1 MG/ML
0.5 SYRINGE (ML) INJECTION EVERY 2 HOUR PRN
Status: DISCONTINUED | OUTPATIENT
Start: 2022-09-27 | End: 2022-09-30 | Stop reason: HOSPADM

## 2022-09-27 RX ORDER — METOCLOPRAMIDE HYDROCHLORIDE 5 MG/ML
5 INJECTION INTRAMUSCULAR; INTRAVENOUS EVERY 6 HOURS PRN
Status: ACTIVE | OUTPATIENT
Start: 2022-09-27 | End: 2022-09-28

## 2022-09-27 RX ORDER — CALCIUM CARBONATE 200(500)MG
1000 TABLET,CHEWABLE ORAL DAILY PRN
Status: DISCONTINUED | OUTPATIENT
Start: 2022-09-27 | End: 2022-09-30 | Stop reason: HOSPADM

## 2022-09-27 RX ORDER — DIPHENHYDRAMINE HCL 25 MG
25 TABLET ORAL EVERY 6 HOURS PRN
Status: DISCONTINUED | OUTPATIENT
Start: 2022-09-27 | End: 2022-09-30 | Stop reason: HOSPADM

## 2022-09-27 RX ORDER — DIPHENHYDRAMINE HYDROCHLORIDE 50 MG/ML
12.5 INJECTION INTRAMUSCULAR; INTRAVENOUS ONCE AS NEEDED
Status: DISCONTINUED | OUTPATIENT
Start: 2022-09-27 | End: 2022-09-27

## 2022-09-27 RX ORDER — TRANEXAMIC ACID 100 MG/ML
INJECTION, SOLUTION INTRAVENOUS AS NEEDED
Status: DISCONTINUED | OUTPATIENT
Start: 2022-09-27 | End: 2022-09-27

## 2022-09-27 RX ORDER — KETOROLAC TROMETHAMINE 30 MG/ML
INJECTION, SOLUTION INTRAMUSCULAR; INTRAVENOUS AS NEEDED
Status: DISCONTINUED | OUTPATIENT
Start: 2022-09-27 | End: 2022-09-27

## 2022-09-27 RX ORDER — KETOROLAC TROMETHAMINE 30 MG/ML
30 INJECTION, SOLUTION INTRAMUSCULAR; INTRAVENOUS EVERY 6 HOURS PRN
Status: DISCONTINUED | OUTPATIENT
Start: 2022-09-27 | End: 2022-09-27

## 2022-09-27 RX ORDER — ONDANSETRON 2 MG/ML
4 INJECTION INTRAMUSCULAR; INTRAVENOUS EVERY 4 HOURS PRN
Status: ACTIVE | OUTPATIENT
Start: 2022-09-27 | End: 2022-09-28

## 2022-09-27 RX ORDER — CYCLOBENZAPRINE HCL 10 MG
10 TABLET ORAL 2 TIMES DAILY PRN
Status: DISCONTINUED | OUTPATIENT
Start: 2022-09-27 | End: 2022-09-30 | Stop reason: HOSPADM

## 2022-09-27 RX ORDER — METOCLOPRAMIDE HYDROCHLORIDE 5 MG/ML
INJECTION INTRAMUSCULAR; INTRAVENOUS AS NEEDED
Status: DISCONTINUED | OUTPATIENT
Start: 2022-09-27 | End: 2022-09-27

## 2022-09-27 RX ORDER — DEXAMETHASONE SODIUM PHOSPHATE 10 MG/ML
INJECTION, SOLUTION INTRAMUSCULAR; INTRAVENOUS AS NEEDED
Status: DISCONTINUED | OUTPATIENT
Start: 2022-09-27 | End: 2022-09-27

## 2022-09-27 RX ORDER — ACETAMINOPHEN 325 MG/1
650 TABLET ORAL EVERY 6 HOURS SCHEDULED
Status: DISCONTINUED | OUTPATIENT
Start: 2022-09-27 | End: 2022-09-30 | Stop reason: HOSPADM

## 2022-09-27 RX ORDER — METHYLERGONOVINE MALEATE 0.2 MG/ML
INJECTION INTRAVENOUS AS NEEDED
Status: DISCONTINUED | OUTPATIENT
Start: 2022-09-27 | End: 2022-09-27 | Stop reason: HOSPADM

## 2022-09-27 RX ORDER — DIAPER,BRIEF,INFANT-TODD,DISP
1 EACH MISCELLANEOUS DAILY PRN
Status: DISCONTINUED | OUTPATIENT
Start: 2022-09-27 | End: 2022-09-30 | Stop reason: HOSPADM

## 2022-09-27 RX ORDER — OXYTOCIN/RINGER'S LACTATE 30/500 ML
62.5 PLASTIC BAG, INJECTION (ML) INTRAVENOUS ONCE
Status: COMPLETED | OUTPATIENT
Start: 2022-09-27 | End: 2022-09-27

## 2022-09-27 RX ORDER — SODIUM CHLORIDE, SODIUM LACTATE, POTASSIUM CHLORIDE, CALCIUM CHLORIDE 600; 310; 30; 20 MG/100ML; MG/100ML; MG/100ML; MG/100ML
125 INJECTION, SOLUTION INTRAVENOUS CONTINUOUS
Status: DISCONTINUED | OUTPATIENT
Start: 2022-09-27 | End: 2022-09-30 | Stop reason: HOSPADM

## 2022-09-27 RX ORDER — ONDANSETRON 2 MG/ML
INJECTION INTRAMUSCULAR; INTRAVENOUS AS NEEDED
Status: DISCONTINUED | OUTPATIENT
Start: 2022-09-27 | End: 2022-09-27

## 2022-09-27 RX ORDER — MORPHINE SULFATE 0.5 MG/ML
INJECTION, SOLUTION EPIDURAL; INTRATHECAL; INTRAVENOUS AS NEEDED
Status: DISCONTINUED | OUTPATIENT
Start: 2022-09-27 | End: 2022-09-27

## 2022-09-27 RX ORDER — KETOROLAC TROMETHAMINE 30 MG/ML
15 INJECTION, SOLUTION INTRAMUSCULAR; INTRAVENOUS EVERY 6 HOURS
Status: COMPLETED | OUTPATIENT
Start: 2022-09-27 | End: 2022-09-28

## 2022-09-27 RX ORDER — OXYCODONE HYDROCHLORIDE 10 MG/1
10 TABLET ORAL EVERY 4 HOURS PRN
Status: DISCONTINUED | OUTPATIENT
Start: 2022-09-27 | End: 2022-09-30 | Stop reason: HOSPADM

## 2022-09-27 RX ADMIN — METOCLOPRAMIDE HYDROCHLORIDE 5 MG: 5 INJECTION INTRAMUSCULAR; INTRAVENOUS at 13:45

## 2022-09-27 RX ADMIN — CEFAZOLIN SODIUM 2000 MG: 2 SOLUTION INTRAVENOUS at 13:29

## 2022-09-27 RX ADMIN — DOCUSATE SODIUM 100 MG: 100 CAPSULE, LIQUID FILLED ORAL at 17:59

## 2022-09-27 RX ADMIN — ONDANSETRON 4 MG: 2 INJECTION INTRAMUSCULAR; INTRAVENOUS at 13:33

## 2022-09-27 RX ADMIN — MORPHINE SULFATE 2 MG: 0.5 INJECTION, SOLUTION EPIDURAL; INTRATHECAL; INTRAVENOUS at 14:01

## 2022-09-27 RX ADMIN — LIDOCAINE HYDROCHLORIDE,EPINEPHRINE BITARTRATE 10 ML: 20; .005 INJECTION, SOLUTION EPIDURAL; INFILTRATION; INTRACAUDAL; PERINEURAL at 13:31

## 2022-09-27 RX ADMIN — SODIUM CHLORIDE, SODIUM LACTATE, POTASSIUM CHLORIDE, AND CALCIUM CHLORIDE 999 ML/HR: .6; .31; .03; .02 INJECTION, SOLUTION INTRAVENOUS at 13:01

## 2022-09-27 RX ADMIN — LIDOCAINE HYDROCHLORIDE,EPINEPHRINE BITARTRATE 5 ML: 20; .005 INJECTION, SOLUTION EPIDURAL; INFILTRATION; INTRACAUDAL; PERINEURAL at 13:34

## 2022-09-27 RX ADMIN — KETOROLAC TROMETHAMINE 15 MG: 30 INJECTION, SOLUTION INTRAMUSCULAR at 20:43

## 2022-09-27 RX ADMIN — TRANEXAMIC ACID 1 G: 1 INJECTION, SOLUTION INTRAVENOUS at 14:00

## 2022-09-27 RX ADMIN — SODIUM CHLORIDE, SODIUM LACTATE, POTASSIUM CHLORIDE, AND CALCIUM CHLORIDE: .6; .31; .03; .02 INJECTION, SOLUTION INTRAVENOUS at 13:24

## 2022-09-27 RX ADMIN — Medication 250 MILLI-UNITS/MIN: at 13:55

## 2022-09-27 RX ADMIN — ACETAMINOPHEN 650 MG: 325 TABLET ORAL at 17:58

## 2022-09-27 RX ADMIN — Medication 62.5 MILLI-UNITS/MIN: at 15:24

## 2022-09-27 RX ADMIN — KETOROLAC TROMETHAMINE 30 MG: 30 INJECTION, SOLUTION INTRAMUSCULAR at 14:22

## 2022-09-27 RX ADMIN — ROPIVACAINE HYDROCHLORIDE: 2 INJECTION, SOLUTION EPIDURAL; INFILTRATION at 08:13

## 2022-09-27 RX ADMIN — DEXAMETHASONE SODIUM PHOSPHATE 10 MG: 10 INJECTION, SOLUTION INTRAMUSCULAR; INTRAVENOUS at 13:33

## 2022-09-27 RX ADMIN — ONDANSETRON 4 MG: 2 INJECTION INTRAMUSCULAR; INTRAVENOUS at 14:48

## 2022-09-27 RX ADMIN — AZITHROMYCIN 500 MG: 500 INJECTION, POWDER, LYOPHILIZED, FOR SOLUTION INTRAVENOUS at 13:34

## 2022-09-27 RX ADMIN — SODIUM CHLORIDE, SODIUM LACTATE, POTASSIUM CHLORIDE, AND CALCIUM CHLORIDE 125 ML/HR: .6; .31; .03; .02 INJECTION, SOLUTION INTRAVENOUS at 03:58

## 2022-09-27 RX ADMIN — SODIUM CHLORIDE, SODIUM LACTATE, POTASSIUM CHLORIDE, AND CALCIUM CHLORIDE 999 ML/HR: .6; .31; .03; .02 INJECTION, SOLUTION INTRAVENOUS at 09:21

## 2022-09-27 RX ADMIN — SODIUM CHLORIDE, SODIUM LACTATE, POTASSIUM CHLORIDE, AND CALCIUM CHLORIDE 125 ML/HR: .6; .31; .03; .02 INJECTION, SOLUTION INTRAVENOUS at 15:19

## 2022-09-27 RX ADMIN — ACETAMINOPHEN 650 MG: 325 TABLET, FILM COATED ORAL at 04:23

## 2022-09-27 NOTE — ANESTHESIA PROCEDURE NOTES
Epidural Block    Patient location during procedure: OB  Start time: 9/26/2022 11:46 PM  Reason for block: procedure for pain and at surgeon's request  Staffing  Performed: CRNA   Resident/CRNA: Daniela García CRNA  Preanesthetic Checklist  Completed: patient identified, IV checked, site marked, risks and benefits discussed, surgical consent, monitors and equipment checked, pre-op evaluation and timeout performed  Epidural  Patient position: sitting  Prep: ChloraPrep  Patient monitoring: frequent blood pressure checks, continuous pulse ox and heart rate  Approach: midline  Location: lumbar  Injection technique: RUTH air  Needle  Needle type: Tuohy   Needle gauge: 17 G  Catheter type: side hole  Catheter size: 19 G  Catheter at skin depth: 13 cm  Test dose: negative  Assessment  Number of attempts: 1negative aspiration for CSF, negative aspiration for heme and no paresthesia on injection  patient tolerated the procedure well with no immediate complications

## 2022-09-27 NOTE — ANESTHESIA POSTPROCEDURE EVALUATION
Post-Op Assessment Note    CV Status:  Stable  Pain Score: 0    Pain management: adequate     Mental Status:  Awake and alert   Hydration Status:  Stable   PONV Controlled:  Controlled   Airway Patency:  Patent      Post Op Vitals Reviewed: Yes      Staff: CRNA         No complications documented  BP 96/51 (09/27/22 1430)    Temp 98 3 °F (36 8 °C) (09/27/22 1430)    Pulse 87 (09/27/22 1433)   Resp 18 (09/27/22 1430)    SpO2 95 % (09/27/22 1433)      Patient states that she is cold

## 2022-09-27 NOTE — OB LABOR/OXYTOCIN SAFETY PROGRESS
Labor Progress Note - Jeni Flores 24 y o  female MRN: [id number]    Unit/Bed#: -01 Encounter: [id number]    Dose (manfred-units/min) Oxytocin: 6 manfred-units/min  Contraction Frequency (minutes): 2-4  Contraction Quality: Moderate  Tachysystole: No   Cervical Dilation: 5-6        Cervical Effacement: 80  Fetal Station: -2  Baseline Rate: 135 bpm  Fetal Heart Rate: 128 BPM  FHR Category: Category II               Vital Signs:   Vitals:    09/27/22 1149   BP: 126/70   Pulse: (!) 107   Resp:    Temp:    SpO2:        Notes/comments:   SVE 5 5/80/-2  Reviewed cat ii tracing  Had been responding to intrauterine resuscitation however in the last 15 minutes recurrent lates more persistent  Have repositioned, decreased pitocin, is getting IVF  Will start oxygen and an amnioinfusion and if persistent will recommend CS  Will reassess in 10-15 minutes    Reviewed course of CS if indicated  Mic Uribe, her partner and mother all in room and express understanding    Huddled with Berhane Obregon RN; Dr Shun Voss and Dr Tran Ortega who are in agreement    Shane Wellington MD 9/27/2022 12:04 PM

## 2022-09-27 NOTE — ANESTHESIA PREPROCEDURE EVALUATION
Procedure:  LABOR ANALGESIA    Relevant Problems   GYN   (+) 37 weeks gestation of pregnancy      NEURO/PSYCH   (+) Hx of migraines   (+) Persistent headaches      Cardiovascular and Mediastinum   (+) Gestational hypertension, third trimester      Nervous and Auditory   (+) Chiari I malformation (HCC)      Other   (+) Encounter for induction of labor          Physical Exam    Airway    Mallampati score: II  TM Distance: >3 FB  Neck ROM: full     Dental       Cardiovascular      Pulmonary      Other Findings  gravid      Anesthesia Plan  ASA Score- 2     Anesthesia Type- epidural with ASA Monitors  Additional Monitors:   Airway Plan:           Plan Factors-Exercise tolerance (METS): >4 METS  Chart reviewed  Patient summary reviewed  Patient is not a current smoker  Induction-     Postoperative Plan-     Informed Consent- Anesthetic plan and risks discussed with patient  I personally reviewed this patient with the CRNA  Discussed and agreed on the Anesthesia Plan with the CRNA  Silas Ayala

## 2022-09-27 NOTE — OB LABOR/OXYTOCIN SAFETY PROGRESS
Oxytocin Safety Progress Check Note - Mingo Estrada 24 y o  female MRN: [id number]    Unit/Bed#: -01 Encounter: [id number]    Dose (manfred-units/min) Oxytocin: 14 manfred-units/min  Contraction Frequency (minutes): 2 5-3  Contraction Quality: Moderate  Tachysystole: No   Cervical Dilation: 5-6        Cervical Effacement: 80  Fetal Station: -2  Baseline Rate: 135 bpm  Fetal Heart Rate: 134 BPM  FHR Category: Category II               Vital Signs:   Vitals:    09/27/22 0835   BP: 125/58   Pulse: 85   Resp:    Temp:    SpO2:        Notes/comments: SVE unchanged  FHT cat II with intermittent late decels  Patient was repositioned, given fluids, and pit held  FHT back to baseline  Continue to monitor               Maddison Keen MD 9/27/2022 9:16 AM

## 2022-09-27 NOTE — OB LABOR/OXYTOCIN SAFETY PROGRESS
Oxytocin Safety Progress Check Note - Selina Abraham 24 y o  female MRN: [id number]    Unit/Bed#: -01 Encounter: [id number]    Dose (manfred-units/min) Oxytocin: 12 manfred-units/min  Contraction Frequency (minutes): 2-4 5  Contraction Quality: Moderate  Tachysystole: No   Cervical Dilation: 5-6        Cervical Effacement: 70  Fetal Station: -2  Baseline Rate: 125 bpm  Fetal Heart Rate: 128 BPM  FHR Category: Category I               Vital Signs:   Vitals:    09/27/22 0634   BP: 124/59   Pulse: 86   Resp:    Temp:    SpO2:        Notes/comments: patient is comfortable on epidural  Sve is unchanged from last check of 5 5/70/-2  AROM was performed and meconium fluid noted  IUPC was placed for better tracing of contractions  Plan is to continue to titrate pitocin and recheck in 2 hours         Discussed with Dr Nathan Mccall MD 9/27/2022 6:36 AM

## 2022-09-27 NOTE — DISCHARGE SUMMARY
Discharge Summary - Ryan Pollack 24 y o  female MRN: [id number]    Unit/Bed#: LD PACU-01 Encounter: [id number]    Admission Date: 2022     Discharge Date: 2022    Admitting Attending: Jun Zaidi Attending: Jose Escobar  Discharge Attending: Maricel Rene    Diagnosis:  Pregnancy at 37w3d  Persistent category 2 fetal heart tracing  Gestational hypertension  Persistent migraines  Chiari 1 malformation    Procedures: primary low transverse  section     Complications: none apparent     Ryan Pollack is a 24 y o  Leanor Renita with an FREDY of Estimated Date of Delivery: 10/15/22 at 44w3d who was admitted for an induction of labor for gestational hypertension  She received a Mejia and 2 doses Cytotec for cervical ripening  After this, Pitocin was started and the patient eventually received an epidural   During her labor course Pitocin was turned off due to persistent late decelerations and then restarted  She was eventually artificially ruptured for meconium fluid an IUPC was placed due to protracted dilation  Following this, she again began to have intermittent late decelerations which did not improve with resuscitative measures  After approximately 1 hour with a category 2 fetal heart tracing, decision was made to proceed to the OR for primary  section  She underwent an uncomplicated  delivery  She delivered a viable female  at 46 on 22  Weight was 6lbs 3 8oz with APGARs of 8 and 9 at 1 and 5 minutes  Her preoperative Hb was 12 1  Her postoperative Hb was 11 3  Her postoperative course was uncomplicated  Condition at discharge: good     On day of discharge pain was well controlled, patient was tolerating PO, passing flatus, had a bowel movement  She was discharged with standard post partum/ post operative instructions to follow up with her physician in 1 week for an incision check and in 3-6 weeks for a postpartum appointment       Discharge instructions/Information to patient and family:   -Do not place anything (no partner, tampons or douche) in your vagina for 6 weeks  -You may walk for exercise for the first 6 weeks then gradually return to your usual activities    -Please do not drive for 1 week if you have no stitches and for 2 weeks if you have stitches or underwent a  delivery     -You may take baths or shower per your preference    -Please look at your bust (breasts) in the mirror daily and call for redness or tenderness or increased warmth    -Please call us for temperature > 100 4*F or 38* C, worsening pain or a foul discharge  Discharge Medications:   Prenatal vitamin daily for 6 months or the duration of nursing whichever is longer  Motrin 600 mg orally every 6 hours as needed for pain  Tylenol (over the counter) per bottle directions as needed for pain: do NOT use with percocet  Hydrocortisone cream 1% (over the counter) applied 1-2x daily to hemorrhoids as needed  Percocet as needed    Provisions for Follow-Up Care: Follow up with your doctor in 1 week for incision site check       Planned Readmission: Sobia Chow  24

## 2022-09-27 NOTE — OB LABOR/OXYTOCIN SAFETY PROGRESS
Labor Progress Note - Alonso Villasenor 24 y o  female MRN: [id number]    Unit/Bed#: -01 Encounter: [id number]    Dose (manfred-units/min) Oxytocin: 2 manfred-units/min  Contraction Frequency (minutes): 2-8  Contraction Quality: Moderate  Tachysystole: No   Cervical Dilation: 5-6        Cervical Effacement: 70  Fetal Station: -2  Baseline Rate: 135 bpm  Fetal Heart Rate: 135 BPM  FHR Category: Category I               Vital Signs:   Vitals:    09/27/22 0349   BP: 112/55   Pulse: 88   Resp:    Temp:    SpO2:        Notes/comments: FHT indicated late recurrent decelerations  Pitocin was stopped, fluid bolus given and patient repositioned  FHt is now back to baseline  Plan is to restart Pitocin after a 30 minute pit break         Discussed with Dr Doreen Almazan MD 9/27/2022 4:15 AM

## 2022-09-27 NOTE — OB LABOR/OXYTOCIN SAFETY PROGRESS
Labor Progress Note - Isaac Ghee 24 y o  female MRN: [id number]    Unit/Bed#: -01 Encounter: [id number]    Dose (manfred-units/min) Oxytocin: 14 manfred-units/min  Contraction Frequency (minutes): 2-3  Contraction Quality: Mild  Tachysystole: No   Cervical Dilation: 4        Cervical Effacement: 60  Fetal Station: -2  Baseline Rate: 150 bpm  Fetal Heart Rate: 142 BPM  FHR Category: Category I               Vital Signs:   Vitals:    09/26/22 2110   BP: 134/76   Pulse: 97   Resp: 18   Temp: 98 6 °F (37 °C)       Notes/comments: patient is comfortable supine  SVE is unchanged from last check at 4/60/-2  She is erin irregularly  Pitocin is at 14  FHT is CAT 1   The plan is to continue to titrate Pitocin and recheck in two hours  Discussed with Dr Yakelin Gorman MD 9/26/2022 9:33 PM

## 2022-09-27 NOTE — OB LABOR/OXYTOCIN SAFETY PROGRESS
Labor Progress Note - Leonel Hodgkins 24 y o  female MRN: [id number]    Unit/Bed#: -01 Encounter: [id number]    Dose (manfred-units/min) Oxytocin: 0 manfred-units/min  Contraction Frequency (minutes): 3-4  Contraction Quality: Moderate  Tachysystole: No   Cervical Dilation: 5-6        Cervical Effacement: 80  Fetal Station: -2  Baseline Rate: 160 bpm  Fetal Heart Rate: 162 BPM  FHR Category: Category II             Vital Signs:   Vitals:    22 1305   BP: 128/71   Pulse: 93   Resp:    Temp:    SpO2:        Notes/comments:   SAFETY HUDDLE:  TRIGGER: Category 2 FHR tracing    PARTICIPANTS: Ya Austin MD, Dr Rodolfo Swenson and Dr Adriano Marcos: The tracing is Category 2 with the presence of moderate variability or accelerations  There have been significant decelerations with 50% or more of the contractions in the last hour  The patient is in the latent phase of labor  For this reason, I recommend  for persistent category II tracing  Explained to Dinah and her mother that tracing initially improved to category 1 with initial decrease in pitocin and intrauterine resuscitation and then returned to persistent cat II  Dinah is aware in agreement with plan      TIMELINE FOR NEXT ASSESSMENT: NA, will move forward with  at this time    ALL PARTICIPANTS IN AGREEMENT WITH PLAN OF CARE: Yes    IS PERRT REQUIRED: No     Ya Austin MD 2022 1:20 PM

## 2022-09-27 NOTE — PLAN OF CARE
Problem: Knowledge Deficit  Goal: Verbalizes understanding of labor plan  Description: Assess patient/family/caregiver's baseline knowledge level and ability to understand information  Provide education via patient/family/caregiver's preferred learning method at appropriate level of understanding  1  Provide teaching at level of understanding  2  Provide teaching via preferred learning method(s)  Outcome: Progressing  Goal: Patient/family/caregiver demonstrates understanding of disease process, treatment plan, medications, and discharge instructions  Description: Complete learning assessment and assess knowledge base  Interventions:  - Provide teaching at level of understanding  - Provide teaching via preferred learning methods  Outcome: Progressing     Problem: Labor & Delivery  Goal: Manages discomfort  Description: Assess and monitor for signs and symptoms of discomfort  Assess patient's pain level regularly and per hospital policy  Administer medications as ordered  Support use of nonpharmacological methods to help control pain such as distraction, imagery, relaxation, and application of heat and cold  Collaborate with interdisciplinary team and patient to determine appropriate pain management plan  1  Include patient in decisions related to comfort  2  Offer non-pharmacological pain management interventions  3  Report ineffective pain management to physician  Outcome: Progressing  Goal: Patient vital signs are stable  Description: 1  Assess vital signs - vaginal delivery    Outcome: Progressing     Problem: PAIN - ADULT  Goal: Verbalizes/displays adequate comfort level or baseline comfort level  Description: Interventions:  - Encourage patient to monitor pain and request assistance  - Assess pain using appropriate pain scale  - Administer analgesics based on type and severity of pain and evaluate response  - Implement non-pharmacological measures as appropriate and evaluate response  - Consider cultural and social influences on pain and pain management  - Notify physician/advanced practitioner if interventions unsuccessful or patient reports new pain  Outcome: Progressing     Problem: INFECTION - ADULT  Goal: Absence or prevention of progression during hospitalization  Description: INTERVENTIONS:  - Assess and monitor for signs and symptoms of infection  - Monitor lab/diagnostic results  - Monitor all insertion sites, i e  indwelling lines, tubes, and drains  - Monitor endotracheal if appropriate and nasal secretions for changes in amount and color  - [place] appropriate cooling/warming therapies per order  - Administer medications as ordered  - Instruct and encourage patient and family to use good hand hygiene technique  - Identify and instruct in appropriate isolation precautions for identified infection/condition  Outcome: Progressing  Goal: Absence of fever/infection during neutropenic period  Description: INTERVENTIONS:  - Monitor WBC    Outcome: Progressing     Problem: SAFETY ADULT  Goal: Patient will remain free of falls  Description: INTERVENTIONS:  - Educate patient/family on patient safety including physical limitations  - Instruct patient to call for assistance with activity   - Consult OT/PT to assist with strengthening/mobility   - Keep Call bell within reach  - Keep bed low and locked with side rails adjusted as appropriate  - Keep care items and personal belongings within reach  - Initiate and maintain comfort rounds  - Make Fall Risk Sign visible to staff  - Apply yellow socks and bracelet for high fall risk patients  - Consider moving patient to room near nurses station  Outcome: Progressing  Goal: Maintain or return to baseline ADL function  Description: INTERVENTIONS:  -  Assess patient's ability to carry out ADLs; assess patient's baseline for ADL function and identify physical deficits which impact ability to perform ADLs (bathing, care of mouth/teeth, toileting, grooming, dressing, etc )  - Assess/evaluate cause of self-care deficits   - Assess range of motion  - Assess patient's mobility; develop plan if impaired  - Assess patient's need for assistive devices and provide as appropriate  - Encourage maximum independence but intervene and supervise when necessary  - Involve family in performance of ADLs  - Assess for home care needs following discharge   - Consider OT consult to assist with ADL evaluation and planning for discharge  - Provide patient education as appropriate  Outcome: Progressing  Goal: Maintains/Returns to pre admission functional level  Description: INTERVENTIONS:  - Perform BMAT or MOVE assessment daily    - Set and communicate daily mobility goal to care team and patient/family/caregiver     - Collaborate with rehabilitation services on mobility goals if consulted  - Out of bed for toileting  - Record patient progress and toleration of activity level   Outcome: Progressing     Problem: DISCHARGE PLANNING  Goal: Discharge to home or other facility with appropriate resources  Description: INTERVENTIONS:  - Identify barriers to discharge w/patient and caregiver  - Arrange for needed discharge resources and transportation as appropriate  - Identify discharge learning needs (meds, wound care, etc )  - Arrange for interpretive services to assist at discharge as needed  - Refer to Case Management Department for coordinating discharge planning if the patient needs post-hospital services based on physician/advanced practitioner order or complex needs related to functional status, cognitive ability, or social support system  Outcome: Progressing     Problem: ANTEPARTUM  Goal: Maintain pregnancy as long as maternal and/or fetal condition is stable  Description: INTERVENTIONS:  - Maternal surveillance  - Fetal surveillance  - Monitor uterine activity  - Medications as ordered  - Bedrest  Outcome: Progressing     Problem: BIRTH - VAGINAL/ SECTION  Goal: Fetal and maternal status remain reassuring during the birth process  Description: INTERVENTIONS:  - Monitor vital signs  - Monitor fetal heart rate  - Monitor uterine activity  - Monitor labor progression (vaginal delivery)  - DVT prophylaxis  - Antibiotic prophylaxis  Outcome: Progressing  Goal: Emotionally satisfying birthing experience for mother/fetus  Description: Interventions:  - Assess, plan, implement and evaluate the nursing care given to the patient in labor  - Advocate the philosophy that each childbirth experience is a unique experience and support the family's chosen level of involvement and control during the labor process   - Actively participate in both the patient's and family's teaching of the birth process  - Consider cultural, Taoist and age-specific factors and plan care for the patient in labor  Outcome: Progressing

## 2022-09-27 NOTE — OB LABOR/OXYTOCIN SAFETY PROGRESS
Oxytocin Safety Progress Check Note - Angelo Frost 24 y o  female MRN: [id number]    Unit/Bed#: -01 Encounter: [id number]    Dose (manfred-units/min) Oxytocin: 14 manfred-units/min  Contraction Frequency (minutes): 2 5-3  Contraction Quality: Moderate  Tachysystole: No   Cervical Dilation: 5-6        Cervical Effacement: 80  Fetal Station: -2  Baseline Rate: 135 bpm  Fetal Heart Rate: 134 BPM  FHR Category: Category II               Vital Signs:   Vitals:    09/27/22 0835   BP: 125/58   Pulse: 85   Resp:    Temp:    SpO2:        Notes/comments: Patient with SVE due to recurrent late decelerations on 14mu pitocin  SVE unchanged  Patient given fluid bolus, repositioned, and pit held  FHT back to baseline at 135bpm, moderate variability, 15x15 accels  Continue to monitor           Marco Antonio Barth MD 9/27/2022 9:17 AM

## 2022-09-27 NOTE — DISCHARGE INSTRUCTIONS

## 2022-09-27 NOTE — OP NOTE
OPERATIVE REPORT  PATIENT NAME: Ryan Pollack    :  1995  MRN: [id number]  Pt Location: [facility] L&D OR ROOM 02    SURGERY DATE: 2022    Surgeon(s) and Role:     * Kenn Guo MD - Primary     * Jerry Anderson MD - Assisting    Preop Diagnosis:  Fetal intolerance to labor, delivered, current hospitalization [O77 9]  Non-reassuring fetal heart rate or rhythm affecting management of mother [O36 8390]    Post-Op Diagnosis Codes:     * Fetal intolerance to labor, delivered, current hospitalization [O77 9]     * Non-reassuring fetal heart rate or rhythm affecting management of mother [O36 8390]    Procedure(s) (LRB):   SECTION () (N/A)    Specimen(s):  ID Type Source Tests Collected by Time Destination   A :  Cord Blood Cord BLOOD GAS, VENOUS, CORD, BLOOD GAS, ARTERIAL, [facility] MD 2022 1352    B :  Tissue (Placenta on Hold) OB Only Placenta PLACENTA  [address] MD 2022 7723        Brief History  Ryan Pollack is a 24 y o   at 44w3d admitted for an induction of labor for gestational hypertension  She received a Mejia and 2 doses Cytotec for cervical ripening  After this, Pitocin was started and the patient eventually received an epidural   During her labor course Pitocin was turned off due to persistent late decelerations and then restarted  She was eventually artificially ruptured for meconium fluid an IUPC was placed due to protracted dilation  Following this, she again began to have intermittent late decelerations which did not improve with resuscitative measures  After approximately 1 hour with a category 2 fetal heart tracing, decision was made to proceed to the OR for primary  section  Operative Indications:  Primary low transverse  section for persistent category 2 FHT    Attending Surgeon: Dr Jose Escobar  Resident Surgeon: Dr Jerry Anderson    Operative Findings:  1   Viable female  at 37w3d with APGARs of 8 and 9 at 1 and 5 minutes  Fetus weighted 6lb 3 8oz   2  Meconium amniotic fluid  3  Normal intact placenta with centrally inserted 3VC  4  Normal uterus, bilateral tubes and ovaries  5  Adhesions absent  6      Umbilical Cord Venous Blood Gas:  Results from last 7 days   Lab Units 09/27/22  1354   PH COV  7 326   PCO2 COV mm HG 45 4*   HCO3 COV mmol/L 23 2   BASE EXC COV mmol/L -3 0*   O2 CT CD VB mL/dL 6 8   O2 HGB, VENOUS CORD % 06 7     Umbilical Cord Arterial Blood Gas:  Results from last 7 days   Lab Units 09/27/22  1354   PH COA  7 285   PCO2 COA  50 0   PO2 COA mm HG 15 5   HCO3 COA mmol/L 23 2   BASE EXC COA mmol/L -3 9*   O2 CONTENT CORD ART ml/dl 6 2   O2 HGB, ARTERIAL CORD % 28 9       Operative Technique  The patient was taken to the operating room  Epidural anesthesia was adequately established and both Ancef and Azithromycin were given for preoperative prophylaxis  The patient was then placed in the dorsal supine position with a left tilt of the hips  The patient was then prepped with chlorhexidine for vaginal prep and chloraprep for abdominal prep and draped in the usual sterile fashion for a Pfannenstiel skin incision  A time out was performed to confirm correct patient and correct procedure  An incision was made in the skin with a surgical scalpel and sharp dissection was carried out over subsequent layers of tissue including the fascia, followed by the Bovie electrocautery for hemostasis  The fascia was incised at the midline and the fascial incision was extended bilaterally using the curved Alexander scissors  The inferior edge of the fascial incision was grasped with Kocher clamps, tented up and the underlying rectus muscles were dissected off bluntly  The same was done superiorly  The rectus muscles were then divided at midline and the peritoneum was identified, tented up at its upper margin taking care to avoid the bladder, and then entered   The peritoneal incision was extended superiorly and inferiorly  The Sloan-O retractor was inserted and a transverse incision was made in the lower uterine segment using a new surgical blade  The uterine incision was extended cephalad using blunt dissection  The surgeon's hand was placed into the uterine cavity  The fetal head was identified and elevated through the uterine incision with the assistance of fundal pressure  With gentle traction, the shoulder was delivered, followed by the rest of the fetal body  There was no nuchal cord noted  On delivery the cord was doubly clamped and cut after delayed cord clamping  The infant was then passed off the table to the awaiting  staff  The  was noted to cry spontaneously and moved all extremities  Venous and arterial blood gas, cord blood, and portion of cord was obtained for analysis and routine blood testing  The placenta delivery was then sent to storage  Placenta was noted to be intact with a centrally inserted three-vessel cord  Oxytocin was administered by IV infusion to enhance uterine contraction  The uterus was cleared of all clots and remaining products of conception  The uterine incision was re approximated using an 0 Monocryl in a running locked fashion  A second horizontal imbricating stitch with the same suture was applied  Methergine to enhance uterine contraction along with TXA for additional prophylaxis  The uterine incision was examined and noted to be hemostatic  The fascia was re approximated using an 0 Vicryl in a running nonlocked fashion  The subcutaneous tissue was cleared of all clots and debris  Good hemostasis was noted with Bovie electrocautery  The subcutaneous tissue was reapproximated with 2-0 Plain  The skin incision was closed using 4-0 Monocryl with exofin  Good hemostasis was noted  Patient tolerated the procedure well   All needle, sponge, and instrument counts were noted to be correct x 2 at the end of the procedure  Patient was transferred to the recovery room in stable condition  Dr Mack Hernandez was present and participated in the entire surgery        Patient Disposition:  PACU         Jose Sepulveda MD   OB/Gyn PGY-2  3:19 PM  09/27/22

## 2022-09-27 NOTE — OB LABOR/OXYTOCIN SAFETY PROGRESS
Oxytocin Safety Progress Check Note - Emery Filler 24 y o  female MRN: [id number]    Unit/Bed#: -01 Encounter: [id number]    Dose (manfred-units/min) Oxytocin: 16 manfred-units/min  Contraction Frequency (minutes): 2-3  Contraction Quality: Mild  Tachysystole: No   Cervical Dilation: 5-6        Cervical Effacement: 70  Fetal Station: -2  Baseline Rate: 140 bpm  Fetal Heart Rate: 138 BPM  FHR Category: Category I               Vital Signs:   Vitals:    09/26/22 2255   BP: 132/75   Pulse: 96   Resp:    Temp:        Notes/comments:Patient currently feels cramping pain  Plan is to continue to hydrate patient until 0000 and patient is still erin at that time, Pitocin will be started  If patient contraction space out, cytotec will be placed           Discussed with Dr Licha Quiñonez MD 9/26/2022 11:56 PM

## 2022-09-28 LAB
BASOPHILS # BLD AUTO: 0.06 THOUSANDS/ΜL (ref 0–0.1)
BASOPHILS NFR BLD AUTO: 0 % (ref 0–1)
EOSINOPHIL # BLD AUTO: 0.01 THOUSAND/ΜL (ref 0–0.61)
EOSINOPHIL NFR BLD AUTO: 0 % (ref 0–6)
ERYTHROCYTE [DISTWIDTH] IN BLOOD BY AUTOMATED COUNT: 13.2 % (ref 11.6–15.1)
HCT VFR BLD AUTO: 32.4 % (ref 34.8–46.1)
HGB BLD-MCNC: 11.3 G/DL (ref 11.5–15.4)
IMM GRANULOCYTES # BLD AUTO: 0.19 THOUSAND/UL (ref 0–0.2)
IMM GRANULOCYTES NFR BLD AUTO: 1 % (ref 0–2)
LYMPHOCYTES # BLD AUTO: 1.28 THOUSANDS/ΜL (ref 0.6–4.47)
LYMPHOCYTES NFR BLD AUTO: 7 % (ref 14–44)
MCH RBC QN AUTO: 30.2 PG (ref 26.8–34.3)
MCHC RBC AUTO-ENTMCNC: 34.9 G/DL (ref 31.4–37.4)
MCV RBC AUTO: 87 FL (ref 82–98)
MONOCYTES # BLD AUTO: 2.12 THOUSAND/ΜL (ref 0.17–1.22)
MONOCYTES NFR BLD AUTO: 11 % (ref 4–12)
NEUTROPHILS # BLD AUTO: 15.47 THOUSANDS/ΜL (ref 1.85–7.62)
NEUTS SEG NFR BLD AUTO: 81 % (ref 43–75)
NRBC BLD AUTO-RTO: 0 /100 WBCS
PLATELET # BLD AUTO: 232 THOUSANDS/UL (ref 149–390)
PMV BLD AUTO: 10.5 FL (ref 8.9–12.7)
RBC # BLD AUTO: 3.74 MILLION/UL (ref 3.81–5.12)
WBC # BLD AUTO: 19.13 THOUSAND/UL (ref 4.31–10.16)

## 2022-09-28 PROCEDURE — 99024 POSTOP FOLLOW-UP VISIT: CPT | Performed by: OBSTETRICS & GYNECOLOGY

## 2022-09-28 PROCEDURE — 85025 COMPLETE CBC W/AUTO DIFF WBC: CPT

## 2022-09-28 RX ADMIN — SIMETHICONE 80 MG: 80 TABLET, CHEWABLE ORAL at 14:43

## 2022-09-28 RX ADMIN — ACETAMINOPHEN 650 MG: 325 TABLET ORAL at 06:02

## 2022-09-28 RX ADMIN — KETOROLAC TROMETHAMINE 15 MG: 30 INJECTION, SOLUTION INTRAMUSCULAR at 03:03

## 2022-09-28 RX ADMIN — KETOROLAC TROMETHAMINE 15 MG: 30 INJECTION, SOLUTION INTRAMUSCULAR at 08:13

## 2022-09-28 RX ADMIN — NALBUPHINE HYDROCHLORIDE 10 MG: 10 INJECTION, SOLUTION INTRAMUSCULAR; INTRAVENOUS; SUBCUTANEOUS at 03:02

## 2022-09-28 RX ADMIN — ACETAMINOPHEN 650 MG: 325 TABLET ORAL at 11:49

## 2022-09-28 RX ADMIN — ACETAMINOPHEN 650 MG: 325 TABLET ORAL at 00:16

## 2022-09-28 RX ADMIN — ENOXAPARIN SODIUM 40 MG: 40 INJECTION SUBCUTANEOUS at 08:13

## 2022-09-28 RX ADMIN — DOCUSATE SODIUM 100 MG: 100 CAPSULE, LIQUID FILLED ORAL at 08:13

## 2022-09-28 RX ADMIN — IBUPROFEN 600 MG: 600 TABLET ORAL at 21:21

## 2022-09-28 RX ADMIN — ACETAMINOPHEN 650 MG: 325 TABLET ORAL at 18:10

## 2022-09-28 RX ADMIN — DOCUSATE SODIUM 100 MG: 100 CAPSULE, LIQUID FILLED ORAL at 18:10

## 2022-09-28 RX ADMIN — KETOROLAC TROMETHAMINE 15 MG: 30 INJECTION, SOLUTION INTRAMUSCULAR at 14:34

## 2022-09-28 NOTE — UTILIZATION REVIEW
Initial Clinical Review    Admission: Date/Time/Statement:   Admission Orders (From admission, onward)     Ordered        22 2223  Inpatient Admission  Once                      Orders Placed This Encounter   Procedures    Inpatient Admission     Standing Status:   Standing     Number of Occurrences:   1     Order Specific Question:   Level of Care     Answer:   Med Surg [16]     Order Specific Question:   Estimated length of stay     Answer:   More than 2 Midnights     Order Specific Question:   Certification     Answer:   I certify that inpatient services are medically necessary for this patient for a duration of greater than two midnights  See H&P and MD Progress Notes for additional information about the patient's course of treatment  Chief Complaint   Patient presents with    Scheduled Induction       Initial Presentation: 24 y o  female  at 42w4d presented to L&D as inpatient admission for IOL due to gestational hypertension   Continuous external monitoring, IVF,cytotec crocker balloon, IV pitocin if needed Epidural if needed and supportive care      SVE: 0 5/50/-3  FHT: reassuring and reactive   IVF  Cytotec    22 @   0500  Contraction Frequency (minutes): irregular  Contraction Quality: Mild  Tachysystole: No   Cervical Dilation: Fingertip  Cervical Effacement: 50  Fetal Station: -3  Baseline Rate: 135 bpm  Fetal Heart Rate: 142 BPM  FHR Category: Category I  second cytotec     @0630  IV Pitocin  Contraction Frequency (minutes): 2-4  Contraction Quality: Mild  Tachysystole: No   Cervical Dilation: 3-4  Cervical Effacement: 60  Fetal Station: -3  Baseline Rate: 130 bpm  Fetal Heart Rate: 174 BPM  FHR Category: Category I     @ 1215   Dose (manfred-units/min) Oxytocin: 8 manfred-units/min  Contraction Frequency (minutes): 2-3 5  Contraction Quality: Mild  Tachysystole: No   Cervical Dilation: 4  Cervical Effacement: 60  Fetal Station: -2  Baseline Rate: 145 bpm  Fetal Heart Rate: 149 BPM  FHR Category: Category I    @ 2130    Dose (manfred-units/min) Oxytocin: 14 manfred-units/min  Contraction Frequency (minutes): 2-3  Contraction Quality: Mild  Tachysystole: No   Cervical Dilation: 4  Cervical Effacement: 60  Fetal Station: -2  Baseline Rate: 150 bpm  Fetal Heart Rate: 142 BPM  FHR Category: Category I    @2320  S/p epidural  Dose (manfred-units/min) Oxytocin: 16 manfred-units/min  Contraction Frequency (minutes): 2-3  Contraction Quality: Mild  Tachysystole: No   Cervical Dilation: 5-6  Cervical Effacement: 70  Fetal Station: -2  Baseline Rate: 140 bpm  Fetal Heart Rate: 138 BPM  FHR Category: Category     22  @ 0330  Dose (manfred-units/min) Oxytocin: 2 manfred-units/min  Contraction Frequency (minutes): 2-8  Contraction Quality: Moderate  Tachysystole: No   Cervical Dilation: 5-6  Cervical Effacement: 70  Fetal Station: -2  Baseline Rate: 135 bpm  Fetal Heart Rate: 135 BPM  FHR Category: Category I   (+) occasional late decelerations      @ 0915  Dose (manfred-units/min) Oxytocin: 14 manfred-units/min  Contraction Frequency (minutes): 2 5-3  Contraction Quality: Moderate  Tachysystole: No   Cervical Dilation: 5-6  Cervical Effacement: 80  Fetal Station: -2  Baseline Rate: 135 bpm  Fetal Heart Rate: 134 BPM  FHR Category: Category II     @1320  Dose (manfred-units/min) Oxytocin: 0 manfred-units/min  Contraction Frequency (minutes): 3-4  Contraction Quality: Moderate  Tachysystole: No   Cervical Dilation: 5-6  Cervical Effacement: 80  Fetal Station: -2  Baseline Rate: 160 bpm  Fetal Heart Rate: 162 BPM  FHR Category: Category II    There have been significant decelerations with 50% or more of the contractions in the last hour   Proceeding with      22  FEMALE @ 1354  APGAR 8/9  WT  2830 GRAMS  TAKEN TO NBN           Admission  Vitals   Temperature Pulse Respirations Blood Pressure SpO2   223 223 22 2339   97 8 °F (36 6 °C) 105 18 136/88 95 %      Temp Source Heart Rate Source Patient Position - Orthostatic VS BP Location FiO2 (%)   09/25/22 2153 09/25/22 2153 09/25/22 2153 09/25/22 2153 --   Oral Monitor Sitting Right arm       Pain Score       09/25/22 2153       No Pain          Wt Readings from Last 1 Encounters:   09/25/22 108 kg (239 lb)     Additional Vital Signs: *  26/22 1138 -- 106 Abnormal  -- 126/75 -- -- -- -- --   09/26/22 1123 -- 134 Abnormal  -- 114/71 -- -- -- -- --   09/26/22 1053 -- 106 Abnormal  -- 116/58 -- -- -- -- --   09/26/22 1038 -- 108 Abnormal  -- 112/55 -- -- -- -- --   09/26/22 1023 -- 100 -- 118/60 -- -- -- -- --   09/26/22 1008 -- 108 Abnormal  -- 113/59 -- -- -- -- --   09/26/22 0953 -- 112 Abnormal  -- 107/55 -- -- -- -- --   09/26/22 0938 -- 110 Abnormal  -- 103/55 -- -- -- -- --   09/26/22 0923 -- 110 Abnormal  -- 113/59 -- -- -- -- --   09/26/22 0858 -- 121 Abnormal  -- 143/72 -- -- -- -- --   09/26/22 0758 98 °F (36 7 °C) 109 Abnormal  18 113/58 -- -- -- -- --   09/26/22 0658 -- 107 Abnormal  -- 116/57 -- -- -- -- --   09/26/22 0600 -- 111 Abnormal  -- 132/77 -- -- -- -- --   09/26/22 0500 -- 108 Abnormal  -- 136/78 -- -- -- -- --   09/26/22 0400 -- 99 -- 148/88 -- -- -- -- --   09/26/22 0255 -- 99 -- 134/73 -- -- -- -- --   09/26/22 0145 -- 100 -- 138/80 -- -- -- -- --   09/26/22 0130 -- 94 -- 132/68 -- -- -- -- --   09/26/22 0115 -- 105 -- 130/78 -- -- -- -- --   09/26/22 0100 -- 96 -- 127/65 -- -- -- -- --   09/26/22 0045 -- 95 -- 131/70 -- -- -- -- --   09/26/22 0030 -- 100 -- 128/66 -- -- -- -- --   09/26/22 0015 -- 93 -- 125/67 -- -- -- -- --   09/26/22 0000 -- 97 -- 120/66 -- -- -- -- --         Pertinent Labs/Diagnostic Test Results:   No orders to display         Results from last 7 days   Lab Units 09/28/22  0448 09/25/22  2228   WBC Thousand/uL 19 13* 12 42*   HEMOGLOBIN g/dL 11 3* 12 1   HEMATOCRIT % 32 4* 35 9   PLATELETS Thousands/uL 232 256   NEUTROS ABS Thousands/µL 15 47*  --          Results from last 7 days   Lab Units 22  2228   SODIUM mmol/L 135   POTASSIUM mmol/L 3 9   CHLORIDE mmol/L 105   CO2 mmol/L 21   ANION GAP mmol/L 9   BUN mg/dL 6   CREATININE mg/dL 0 50*   EGFR ml/min/1 73sq m 134   CALCIUM mg/dL 10 3*     Results from last 7 days   Lab Units 22  2228   AST U/L 10*   ALT U/L 8   ALK PHOS U/L 107*   TOTAL PROTEIN g/dL 6 4   ALBUMIN g/dL 3 5   TOTAL BILIRUBIN mg/dL 0 27         Results from last 7 days   Lab Units 22  2228   GLUCOSE RANDOM mg/dL 97     Results from last 7 days   Lab Units 22  0036   CREATININE UR mg/dL 77 9   PROTEIN UR mg/dL 11   PROT/CREAT RATIO UR  0 14*       Past Medical History:   Diagnosis Date    Encounter for induction of labor 2022    Migraine      Present on Admission:   Gestational hypertension   Persistent headaches   Chiari I malformation (HCC)      Admitting Diagnosis: Encounter for induction of labor [Z34 90]  Gestational hypertension [O13 9]  37 weeks gestation of pregnancy [Z3A 37]  Encounter for  delivery without indication [O82]  Age/Sex: 24 y o  female  Admission Orders:  Scheduled Medications:  acetaminophen, 650 mg, Oral, Q6H [address]  docusate sodium, 100 mg, Oral, BID  enoxaparin, 40 mg, Subcutaneous, Daily  ketorolac, 15 mg, Intravenous, Q6H   Followed by  ibuprofen, 600 mg, Oral, Q6H      Continuous IV Infusions:  lactated ringers, 125 mL/hr, Intravenous, Continuous      PRN Meds:  benzocaine-menthol-lanolin-aloe, 1 application, Topical, H0S PRN  calcium carbonate, 1,000 mg, Oral, Daily PRN  cyclobenzaprine, 10 mg, Oral, BID PRN  dexamethasone, 8 mg, Intravenous, Once PRN  diphenhydrAMINE, 25 mg, Oral, Q6H PRN  hydrocortisone, 1 application, Topical, Daily PRN  HYDROmorphone, 0 5 mg, Intravenous, Q2H PRN  metoclopramide, 5 mg, Intravenous, Q6H PRN  nalbuphine, 10 mg, Intravenous, Q3H PRN  naloxone, 0 1 mg, Intravenous, Q3 min PRN  ondansetron, 4 mg, Intravenous, Q4H PRN  oxyCODONE, 10 mg, Oral, Q4H PRN  oxyCODONE, 5 mg, Oral, Q4H PRN  simethicone, 80 mg, Oral, 4x Daily PRN  witch hazel-glycerin, 1 pad, Topical, Q4H PRN        None    Network Utilization Review Department  ATTENTION: Please call with any questions or concerns to [phone] and carefully listen to the prompts so that you are directed to the right person  All voicemails are confidential   Lynette Kussmaul all requests for admission clinical reviews, approved or denied determinations and any other requests to dedicated fax number below belonging to the campus where the patient is receiving treatment   List of dedicated fax numbers for the Facilities:  [address] DENIALS (Administrative/Medical Necessity) [phone]   [address] (Maternity/NICU/Pediatrics) [phone]   [address]  [address] [facility] [place] [address] [id number] [place] [address] [address] Jeanne Rossi [address] [place] [address] [phone]

## 2022-09-28 NOTE — PLAN OF CARE
Problem: PAIN - ADULT  Goal: Verbalizes/displays adequate comfort level or baseline comfort level  Description: Interventions:  - Encourage patient to monitor pain and request assistance  - Assess pain using appropriate pain scale  - Administer analgesics based on type and severity of pain and evaluate response  - Implement non-pharmacological measures as appropriate and evaluate response  - Consider cultural and social influences on pain and pain management  - Notify physician/advanced practitioner if interventions unsuccessful or patient reports new pain  Outcome: Progressing     Problem: INFECTION - ADULT  Goal: Absence or prevention of progression during hospitalization  Description: INTERVENTIONS:  - Assess and monitor for signs and symptoms of infection  - Monitor lab/diagnostic results  - Monitor all insertion sites, i e  indwelling lines, tubes, and drains  - Monitor endotracheal if appropriate and nasal secretions for changes in amount and color  - Bradford appropriate cooling/warming therapies per order  - Administer medications as ordered  - Instruct and encourage patient and family to use good hand hygiene technique  - Identify and instruct in appropriate isolation precautions for identified infection/condition  Outcome: Progressing  Goal: Absence of fever/infection during neutropenic period  Description: INTERVENTIONS:  - Monitor WBC    Outcome: Progressing     Problem: SAFETY ADULT  Goal: Patient will remain free of falls  Description: INTERVENTIONS:  - Educate patient/family on patient safety including physical limitations  - Instruct patient to call for assistance with activity   - Consult OT/PT to assist with strengthening/mobility   - Keep Call bell within reach  - Keep bed low and locked with side rails adjusted as appropriate  - Keep care items and personal belongings within reach  - Initiate and maintain comfort rounds  - Make Fall Risk Sign visible to staff    - Apply yellow socks and bracelet for high fall risk patients  - Consider moving patient to room near nurses station  Outcome: Progressing  Goal: Maintain or return to baseline ADL function  Description: INTERVENTIONS:  -  Assess patient's ability to carry out ADLs; assess patient's baseline for ADL function and identify physical deficits which impact ability to perform ADLs (bathing, care of mouth/teeth, toileting, grooming, dressing, etc )  - Assess/evaluate cause of self-care deficits   - Assess range of motion  - Assess patient's mobility; develop plan if impaired  - Assess patient's need for assistive devices and provide as appropriate  - Encourage maximum independence but intervene and supervise when necessary  - Involve family in performance of ADLs  - Assess for home care needs following discharge   - Consider OT consult to assist with ADL evaluation and planning for discharge  - Provide patient education as appropriate  Outcome: Progressing  Goal: Maintains/Returns to pre admission functional level  Description: INTERVENTIONS:  - Perform BMAT or MOVE assessment daily    - Set and communicate daily mobility goal to care team and patient/family/caregiver     - Collaborate with rehabilitation services on mobility goals if consulted  - Out of bed for toileting  - Record patient progress and toleration of activity level   Outcome: Progressing     Problem: DISCHARGE PLANNING  Goal: Discharge to home or other facility with appropriate resources  Description: INTERVENTIONS:  - Identify barriers to discharge w/patient and caregiver  - Arrange for needed discharge resources and transportation as appropriate  - Identify discharge learning needs (meds, wound care, etc )  - Arrange for interpretive services to assist at discharge as needed  - Refer to Case Management Department for coordinating discharge planning if the patient needs post-hospital services based on physician/advanced practitioner order or complex needs related to functional status, cognitive ability, or social support system  Outcome: Progressing     Problem: POSTPARTUM  Goal: Experiences normal postpartum course  Description: INTERVENTIONS:  - Monitor maternal vital signs  - Assess uterine involution and lochia  Outcome: Progressing  Goal: Appropriate maternal -  bonding  Description: INTERVENTIONS:  - Identify family support  - Assess for appropriate maternal/infant bonding   -Encourage maternal/infant bonding opportunities  - Referral to  or  as needed  Outcome: Progressing  Goal: Establishment of infant feeding pattern  Description: INTERVENTIONS:  - Assess breast/bottle feeding  - Refer to lactation as needed  Outcome: Progressing  Goal: Incision(s), wounds(s) or drain site(s) healing without S/S of infection  Description: INTERVENTIONS  - Assess and document dressing, incision, wound bed, drain sites and surrounding tissue  - Provide patient and family education  Outcome: Progressing

## 2022-09-28 NOTE — PLAN OF CARE
Problem: PAIN - ADULT  Goal: Verbalizes/displays adequate comfort level or baseline comfort level  Description: Interventions:  - Encourage patient to monitor pain and request assistance  - Assess pain using appropriate pain scale  - Administer analgesics based on type and severity of pain and evaluate response  - Implement non-pharmacological measures as appropriate and evaluate response  - Consider cultural and social influences on pain and pain management  - Notify physician/advanced practitioner if interventions unsuccessful or patient reports new pain  Outcome: Progressing     Problem: INFECTION - ADULT  Goal: Absence or prevention of progression during hospitalization  Description: INTERVENTIONS:  - Assess and monitor for signs and symptoms of infection  - Monitor lab/diagnostic results  - Monitor all insertion sites, i e  indwelling lines, tubes, and drains  - Monitor endotracheal if appropriate and nasal secretions for changes in amount and color  - [place] appropriate cooling/warming therapies per order  - Administer medications as ordered  - Instruct and encourage patient and family to use good hand hygiene technique  - Identify and instruct in appropriate isolation precautions for identified infection/condition  Outcome: Progressing  Goal: Absence of fever/infection during neutropenic period  Description: INTERVENTIONS:  - Monitor WBC    Outcome: Progressing     Problem: SAFETY ADULT  Goal: Patient will remain free of falls  Description: INTERVENTIONS:  - Educate patient/family on patient safety including physical limitations  - Instruct patient to call for assistance with activity   - Consult OT/PT to assist with strengthening/mobility   - Keep Call bell within reach  - Keep bed low and locked with side rails adjusted as appropriate  - Keep care items and personal belongings within reach  - Initiate and maintain comfort rounds  - Make Fall Risk Sign visible to staff  - Offer Toileting every  Hours, in advance of need  - Initiate/Maintain alarm  - Obtain necessary fall risk management equipment:   - Apply yellow socks and bracelet for high fall risk patients  - Consider moving patient to room near nurses station  Outcome: Progressing  Goal: Maintain or return to baseline ADL function  Description: INTERVENTIONS:  -  Assess patient's ability to carry out ADLs; assess patient's baseline for ADL function and identify physical deficits which impact ability to perform ADLs (bathing, care of mouth/teeth, toileting, grooming, dressing, etc )  - Assess/evaluate cause of self-care deficits   - Assess range of motion  - Assess patient's mobility; develop plan if impaired  - Assess patient's need for assistive devices and provide as appropriate  - Encourage maximum independence but intervene and supervise when necessary  - Involve family in performance of ADLs  - Assess for home care needs following discharge   - Consider OT consult to assist with ADL evaluation and planning for discharge  - Provide patient education as appropriate  Outcome: Progressing  Goal: Maintains/Returns to pre admission functional level  Description: INTERVENTIONS:  - Perform BMAT or MOVE assessment daily    - Set and communicate daily mobility goal to care team and patient/family/caregiver  - Collaborate with rehabilitation services on mobility goals if consulted  - Perform Range of Motion  times a day  - Reposition patient every  hours    - Dangle patient  times a day  - Stand patient  times a day  - Ambulate patient  times a day  - Out of bed to chair  times a day   - Out of bed for meals  times a day  - Out of bed for toileting  - Record patient progress and toleration of activity level   Outcome: Progressing     Problem: DISCHARGE PLANNING  Goal: Discharge to home or other facility with appropriate resources  Description: INTERVENTIONS:  - Identify barriers to discharge w/patient and caregiver  - Arrange for needed discharge resources and transportation as appropriate  - Identify discharge learning needs (meds, wound care, etc )  - Arrange for interpretive services to assist at discharge as needed  - Refer to Case Management Department for coordinating discharge planning if the patient needs post-hospital services based on physician/advanced practitioner order or complex needs related to functional status, cognitive ability, or social support system  Outcome: Progressing     Problem: POSTPARTUM  Goal: Experiences normal postpartum course  Description: INTERVENTIONS:  - Monitor maternal vital signs  - Assess uterine involution and lochia  Outcome: Progressing  Goal: Appropriate maternal -  bonding  Description: INTERVENTIONS:  - Identify family support  - Assess for appropriate maternal/infant bonding   -Encourage maternal/infant bonding opportunities  - Referral to  or  as needed  Outcome: Progressing  Goal: Establishment of infant feeding pattern  Description: INTERVENTIONS:  - Assess breast/bottle feeding  - Refer to lactation as needed  Outcome: Progressing  Goal: Incision(s), wounds(s) or drain site(s) healing without S/S of infection  Description: INTERVENTIONS  - Assess and document dressing, incision, wound bed, drain sites and surrounding tissue  - Provide patient and family education  - Perform skin care/dressing changes every   Outcome: Progressing

## 2022-09-28 NOTE — PLAN OF CARE
Problem: Knowledge Deficit  Goal: Verbalizes understanding of labor plan  Description: Assess patient/family/caregiver's baseline knowledge level and ability to understand information  Provide education via patient/family/caregiver's preferred learning method at appropriate level of understanding  1  Provide teaching at level of understanding  2  Provide teaching via preferred learning method(s)  Outcome: Completed  Goal: Patient/family/caregiver demonstrates understanding of disease process, treatment plan, medications, and discharge instructions  Description: Complete learning assessment and assess knowledge base  Interventions:  - Provide teaching at level of understanding  - Provide teaching via preferred learning methods  Outcome: Completed     Problem: Labor & Delivery  Goal: Manages discomfort  Description: Assess and monitor for signs and symptoms of discomfort  Assess patient's pain level regularly and per hospital policy  Administer medications as ordered  Support use of nonpharmacological methods to help control pain such as distraction, imagery, relaxation, and application of heat and cold  Collaborate with interdisciplinary team and patient to determine appropriate pain management plan  1  Include patient in decisions related to comfort  2  Offer non-pharmacological pain management interventions  3  Report ineffective pain management to physician  Outcome: Completed  Goal: Patient vital signs are stable  Description: 1  Assess vital signs - vaginal delivery    Outcome: Completed     Problem: ANTEPARTUM  Goal: Maintain pregnancy as long as maternal and/or fetal condition is stable  Description: INTERVENTIONS:  - Maternal surveillance  - Fetal surveillance  - Monitor uterine activity  - Medications as ordered  - Bedrest  Outcome: Completed     Problem: BIRTH - VAGINAL/ SECTION  Goal: Fetal and maternal status remain reassuring during the birth process  Description: INTERVENTIONS:  - Monitor vital signs  - Monitor fetal heart rate  - Monitor uterine activity  - Monitor labor progression (vaginal delivery)  - DVT prophylaxis  - Antibiotic prophylaxis  Outcome: Completed  Goal: Emotionally satisfying birthing experience for mother/fetus  Description: Interventions:  - Assess, plan, implement and evaluate the nursing care given to the patient in labor  - Advocate the philosophy that each childbirth experience is a unique experience and support the family's chosen level of involvement and control during the labor process   - Actively participate in both the patient's and family's teaching of the birth process  - Consider cultural, [place] and age-specific factors and plan care for the patient in labor  Outcome: Completed

## 2022-09-28 NOTE — LACTATION NOTE
This note was copied from a baby's chart  CONSULT - LACTATION  Baby Girl Rachael Kilgore 1 days female MRN: [id number]    [facility] NURSERY Room / Bed: (N)/(N) Encounter: [id number]    Maternal Information     MOTHER:  Sonido Danielson  Maternal Age: 24 y o    OB History: # 1 - Date: , Sex: None, Weight: None, GA: None, Delivery: None, Apgar1: None, Apgar5: None, Living: None, Birth Comments: None    # 2 - Date: , Sex: None, Weight: None, GA: None, Delivery: None, Apgar1: None, Apgar5: None, Living: None, Birth Comments: None    # 3 - Date: 24, Sex: Female, Weight: 2830 g (6 lb 3 8 oz), GA: 37w3d, Delivery: , Low Transverse, Apgar1: 8, Apgar5: 9, Living: Living, Birth Comments: None   Previouse breast reduction surgery? No    Lactation history:   Has patient previously breast fed: No   How long had patient previously breast fed:     Previous breast feeding complications:       Past Surgical History:   Procedure Laterality Date    TONSILLECTOMY      WISDOM TOOTH EXTRACTION          Birth information:  YOB: 2020   Time of birth: 1:54 PM   Sex: female   Delivery type: , Low Transverse   Birth Weight: 2830 g (6 lb 3 8 oz)   Percent of Weight Change: -2%     Gestational Age: 44w3d   [unfilled]    Assessment     Breast and nipple assessment: normal assessment    Roy Assessment: normal assessment    Feeding assessment: feeding well  LATCH:  Latch: Grasps breast, tongue down, lips flanged, rhythmic sucking   Audible Swallowing: Spontaneous and intermittent (24 hours old)   Type of Nipple: Everted (After stimulation)   Comfort (Breast/Nipple): Soft/non-tender   Hold (Positioning): Partial assist, teach one side, mother does other, staff holds   LATCH Score: 9          Feeding recommendations:  breast feed on demand   Education on posiotning and alignment to the breast  Demonstration and teach back of hand expression and breast compressions  Assisted mom in positioning to the breast  Laid back on L breast  Active, coordinated sucking  Education on various positions and what to expect the second night / cluster feeding  Mom wants carolthiago - order sent to     RSB/DC reviewed    Enc  To call lactation for next latch    Information on hand expression given  Discussed benefits of knowing how to manually express breast including stimulating milk supply, softening nipple for latch and evacuating breast in the event of engorgement  Mom is encouraged to place baby skin to skin for feedings  Skin to skin education provided for baby placement on mother's chest, baby only in diaper, blankets below shoulders on baby's back  Skin to skin is encouraged to continue at home for feedings and between feedings  Worked on positioning infant up at chest level and starting to feed infant with nose arriving at the nipple  Then, using areolar compression to achieve a deep latch that is comfortable and exchanges optimum amounts of milk  - Start feedings on breast that last feeding ended   - allow no more than 3 hours between breast feeding sessions   - time between feedings is counted from the beginning of the first feed to the beginning of the next feeding session    Reviewed early signs of hunger, including tensing of hands and shoulders - no need to wait for open eyes  Crying is a late hunger sign  If baby is crying, soothe baby first and then attempt to latch  Reviewed normal sucking patterns: transition from stimulation to nutritive to release or non-nutritive  The goal is to see and hear lots of swallowing  Reviewed normal nursing pattern: infant could latch on one breast up to 30 minutes or until releases on own  Signs of satiation is open hand with fingers that do not grab your finger  Discussed difference in sensation of non-nutritive v nutritive sucking    Met with mother   Provided mother with Ready, Set, Baby booklet  Discussed Skin to Skin contact an benefits to mom and baby  Talked about the delay of the first bath until baby has adjusted  Spoke about the benefits of rooming in  Feeding on cue and what that means for recognizing infant's hunger  Avoidance of pacifiers for the first month discussed  Talked about exclusive breastfeeding for the first 6 months  Positioning and latch reviewed as well as showing images of other feeding positions  Discussed the properties of a good latch in any position  Reviewed hand/manual expression  Discussed s/s that baby is getting enough milk and some s/s that breastfeeding dyad may need further help  Gave information on common concerns, what to expect the first few weeks after delivery, preparing for other caregivers, and how partners can help  Resources for support also provided  Encouraged parents to call for assistance, questions, and concerns about breastfeeding  Extension provided  Provided education on growth spurts, when to introduce bottles; paced bottle feeding, and non-nutritive suck at the breast  Provided education on Signs of satiation  Encouraged to call lactation to observe a latch prior to discharge for reassurance  Encouraged to call baby and me with any questions and closely monitor output  Met with mother to go over discharge breastfeeding booklet including the feeding log  Emphasized 8 or more (12) feedings in a 24 hour period, what to expect for the number of diapers per day of life and the progression of properties of the  stooling pattern  Reviewed breastfeeding and your lifestyle, storage and preparation of breast milk, how to keep you breast pump clean, the employed breastfeeding mother and paced bottle feeding handouts  Booklet included Breastfeeding Resources for after discharge including access to the number for the [id number] [facility] 2022 6:40 PM [Least Pain Intensity: 5/10] : 5/10 [Maximal Pain Intensity: 5/10] : 5/10 [Pain Duration: ___] : Pain duration: [unfilled] [Pain Description/Quality: ___] : Pain description/quality: [unfilled] [OTC] : OTC [Pain Location: ___] : Pain Location: [unfilled] [Pain Interferes with ADLs] : Pain interferes with activities of daily living. [80: Active, but tires more quickly] : 80: Active, but tires more quickly [80: Normal activity with effort; some signs or symptoms of disease.] : 80: Normal activity with effort; some signs or symptoms of disease.  [Date: ____________] : Patient's last distress assessment performed on [unfilled]. [ECOG Performance Status: 1 - Restricted in physically strenuous activity but ambulatory and able to carry out work of a light or sedentary nature] : Performance Status: 1 - Restricted in physically strenuous activity but ambulatory and able to carry out work of a light or sedentary nature, e.g., light house work, office work [0 - No Distress] : Distress Level: 0

## 2022-09-28 NOTE — PROGRESS NOTES
Progress Note - OB/GYN  Ryan Pollack 24 y o  female MRN: [id number]  Unit/Bed#: -01 Encounter: [id number]    Assessment and Plan     Ryan Pollack is a patient of: [facility]   She is POD#1 from 1LTCS for persistent category 2 heart tracing  Recovering well and is stable       Hx of migraines  Assessment & Plan  Monitor symptoms    Gestational hypertension  Assessment & Plan  CBC, CMP wnl, P:C 4 57    Systolic (55ZPR), HFD:115 , Min:96 , OOK:515     Diastolic (10FNH), WGS:14, Min:51, Max:71        * Status post primary low transverse  section  Assessment & Plan  QBL: 584, Hgb 11 8-> 11 2   DVT ppx (BMI 39): Lovenox 40mg daily ordered         Disposition    - Anticipate discharge home on POD# 2 vs 3      Subjective/Objective     Chief Complaint: Postpartum State     Subjective:    Ryan Pollack is PPD/POD#1 s/p 1LTCS  She has no current complaints  Pain is well controlled  Patient is currently voiding  She is ambulating  Patient is not currently passing flatus and has had no bowel movement  She is tolerating PO, and denies nausea or vomitting  Patient denies fever, chills, chest pain, shortness of breath, or calf tenderness  Lochia is normal  She is  Breastfeeding  She is recovering well and is stable         Vitals:   BP 97/53 (BP Location: Left arm)   Pulse 72   Temp 98 1 °F (36 7 °C) (Oral)   Resp 20   Ht 5' 5" (1 651 m)   Wt 108 kg (239 lb)   LMP 2022   SpO2 96%   Breastfeeding Yes   BMI 39 77 kg/m²       Intake/Output Summary (Last 24 hours) at 2022 0604  Last data filed at 2022 0439  Gross per 24 hour   Intake 825 ml   Output 4465 ml   Net -3640 ml       Invasive Devices  Timeline    Peripheral Intravenous Line  Duration           Peripheral IV 22 Right;Ventral (anterior) Forearm 2 days          Intrauterine Pressure Catheter  Duration           Intrauterine Pressure Catheter 22 0618 <1 day                Physical Exam:   GEN: Jeremias Conde Pablito Russell appears well, alert and oriented x 3, pleasant and cooperative   CARDIO: RRR, no murmurs or rubs  RESP:  CTAB, no wheezes or rales  ABDOMEN: soft, no tenderness, no distention, fundus @ U-2, Incision C/D/I  EXTREMITIES: SCDs on, non tender, no erythema      Labs:     Hemoglobin   Date Value Ref Range Status   09/28/2022 11 3 (L) 11 5 - 15 4 g/dL Final   09/25/2022 12 1 11 5 - 15 4 g/dL Final     WBC   Date Value Ref Range Status   09/28/2022 19 13 (H) 4 31 - 10 16 Thousand/uL Final   09/25/2022 12 42 (H) 4 31 - 10 16 Thousand/uL Final     Platelets   Date Value Ref Range Status   09/28/2022 232 149 - 390 Thousands/uL Final   09/25/2022 256 149 - 390 Thousands/uL Final     Creatinine   Date Value Ref Range Status   09/25/2022 0 50 (L) 0 60 - 1 30 mg/dL Final     Comment:     Standardized to IDMS reference method   09/15/2022 0 60 0 60 - 1 30 mg/dL Final     Comment:     Standardized to IDMS reference method     AST   Date Value Ref Range Status   09/25/2022 10 (L) 13 - 39 U/L Final     Comment:     Specimen collection should occur prior to Sulfasalazine administration due to the potential for falsely depressed results  09/15/2022 10 (L) 13 - 39 U/L Final     Comment:     Specimen collection should occur prior to Sulfasalazine administration due to the potential for falsely depressed results  ALT   Date Value Ref Range Status   09/25/2022 8 7 - 52 U/L Final     Comment:     Specimen collection should occur prior to Sulfasalazine administration due to the potential for falsely depressed results  09/15/2022 9 7 - 52 U/L Final     Comment:     Specimen collection should occur prior to Sulfasalazine administration due to the potential for falsely depressed results             Steven Kam MD  9/28/2022  6:04 AM

## 2022-09-29 LAB
DME PARACHUTE DELIVERY DATE ACTUAL: NORMAL
DME PARACHUTE DELIVERY DATE REQUESTED: NORMAL
DME PARACHUTE ITEM DESCRIPTION: NORMAL
DME PARACHUTE ORDER STATUS: NORMAL
DME PARACHUTE SUPPLIER NAME: NORMAL
DME PARACHUTE SUPPLIER PHONE: NORMAL

## 2022-09-29 PROCEDURE — 99024 POSTOP FOLLOW-UP VISIT: CPT | Performed by: OBSTETRICS & GYNECOLOGY

## 2022-09-29 RX ADMIN — DOCUSATE SODIUM 100 MG: 100 CAPSULE, LIQUID FILLED ORAL at 17:32

## 2022-09-29 RX ADMIN — IBUPROFEN 600 MG: 600 TABLET ORAL at 02:58

## 2022-09-29 RX ADMIN — IBUPROFEN 600 MG: 600 TABLET ORAL at 17:32

## 2022-09-29 RX ADMIN — ACETAMINOPHEN 650 MG: 325 TABLET ORAL at 01:21

## 2022-09-29 RX ADMIN — ENOXAPARIN SODIUM 40 MG: 40 INJECTION SUBCUTANEOUS at 09:40

## 2022-09-29 RX ADMIN — ACETAMINOPHEN 650 MG: 325 TABLET ORAL at 20:25

## 2022-09-29 RX ADMIN — IBUPROFEN 600 MG: 600 TABLET ORAL at 09:40

## 2022-09-29 RX ADMIN — OXYCODONE HYDROCHLORIDE 5 MG: 5 TABLET ORAL at 17:59

## 2022-09-29 RX ADMIN — DOCUSATE SODIUM 100 MG: 100 CAPSULE, LIQUID FILLED ORAL at 09:40

## 2022-09-29 RX ADMIN — ACETAMINOPHEN 650 MG: 325 TABLET ORAL at 13:07

## 2022-09-29 NOTE — PLAN OF CARE
"SUBJECTIVE/OBJECTIVE:                Gem Jade is a 73 year old female called for a transitions of care visit. Majority of visit completed with  PHU Liao per patient. She was discharged from [facility] on 1/25/18 for recurrent encephalopathy of unclear origin, possible status epilepticus requiring intubation. Visit was completed as a covisit with Marivel Barcenas.     Chief Complaint: hyperglycemia.    Allergies/ADRs: Reviewed in Epic  Tobacco: History of tobacco dependence - quit   Alcohol: not currently using    Medication Adherence: no issues found and has assistance setting up med boxes (Keshawn-will be transitioning to having HCRN set up medications)    seizure: currently taking Keppra 1000mg BID, started during hospitalization and pt taking without problems. Per chart review, pt recommended to continue on Keppra because of clinical improvement after initiating.     Diabetes:  Pt currently taking Novolog 1-3u TID-QID per hospital sliding scale. Lantus was held due to concern of hypoglycemia contributing to encephalopathy (previous hospitalization . Pt is not experiencing side effects.  SMBG: three times daily.   Ranges (patient reported): 295,324,395,321,469,HI,287  Patient is not experiencing hypoglycemia  Recent symptoms of high blood sugar? fatigue  Eye exam: due  Foot exam: due  Microalbumin is not < 30 mg/g. Pt is not taking an ACEi/ARB.  Aspirin: Taking 81mg daily and denies side effects  Diet/Exercise: increased appetite, right now 1 meal per day, pt states she \"didn't eat anything in the hospital until the last day and that's why I didn't need insulin\"     neuropathy: currently taking gabapentin 600mg BID (dose reduced in hospital from 900mg TID). Pt complained of increased neuropathic pain 2-3 days after hospital stay but no pain reported since that time. Oxcarbazepine and tramadol has been held as recommended during hospital stay.     S/p kidney transplant: currently taking " Problem: PAIN - ADULT  Goal: Verbalizes/displays adequate comfort level or baseline comfort level  Description: Interventions:  - Encourage patient to monitor pain and request assistance  - Assess pain using appropriate pain scale  - Administer analgesics based on type and severity of pain and evaluate response  - Implement non-pharmacological measures as appropriate and evaluate response  - Consider cultural and social influences on pain and pain management  - Notify physician/advanced practitioner if interventions unsuccessful or patient reports new pain  Outcome: Progressing     Problem: INFECTION - ADULT  Goal: Absence or prevention of progression during hospitalization  Description: INTERVENTIONS:  - Assess and monitor for signs and symptoms of infection  - Monitor lab/diagnostic results  - Monitor all insertion sites, i e  indwelling lines, tubes, and drains  - Monitor endotracheal if appropriate and nasal secretions for changes in amount and color  - [place] appropriate cooling/warming therapies per order  - Administer medications as ordered  - Instruct and encourage patient and family to use good hand hygiene technique  - Identify and instruct in appropriate isolation precautions for identified infection/condition  Outcome: Progressing  Goal: Absence of fever/infection during neutropenic period  Description: INTERVENTIONS:  - Monitor WBC    Outcome: Progressing     Problem: SAFETY ADULT  Goal: Patient will remain free of falls  Description: INTERVENTIONS:  - Educate patient/family on patient safety including physical limitations  - Instruct patient to call for assistance with activity   - Consult OT/PT to assist with strengthening/mobility   - Keep Call bell within reach  - Keep bed low and locked with side rails adjusted as appropriate  - Keep care items and personal belongings within reach  - Initiate and maintain comfort rounds  - Make Fall Risk Sign visible to staff  - Offer Toileting every  Hours, in advance of need  - Initiate/Maintain alarm  - Obtain necessary fall risk management equipment:   - Apply yellow socks and bracelet for high fall risk patients  - Consider moving patient to room near nurses station  Outcome: Progressing  Goal: Maintain or return to baseline ADL function  Description: INTERVENTIONS:  -  Assess patient's ability to carry out ADLs; assess patient's baseline for ADL function and identify physical deficits which impact ability to perform ADLs (bathing, care of mouth/teeth, toileting, grooming, dressing, etc )  - Assess/evaluate cause of self-care deficits   - Assess range of motion  - Assess patient's mobility; develop plan if impaired  - Assess patient's need for assistive devices and provide as appropriate  - Encourage maximum independence but intervene and supervise when necessary  - Involve family in performance of ADLs  - Assess for home care needs following discharge   - Consider OT consult to assist with ADL evaluation and planning for discharge  - Provide patient education as appropriate  Outcome: Progressing  Goal: Maintains/Returns to pre admission functional level  Description: INTERVENTIONS:  - Perform BMAT or MOVE assessment daily    - Set and communicate daily mobility goal to care team and patient/family/caregiver  - Collaborate with rehabilitation services on mobility goals if consulted  - Perform Range of Motion  times a day  - Reposition patient every  hours    - Dangle patient  times a day  - Stand patient  times a day  - Ambulate patient  times a day  - Out of bed to chair  times a day   - Out of bed for meals  times a day  - Out of bed for toileting  - Record patient progress and toleration of activity level   Outcome: Progressing     Problem: DISCHARGE PLANNING  Goal: Discharge to home or other facility with appropriate resources  Description: INTERVENTIONS:  - Identify barriers to discharge w/patient and caregiver  - Arrange for needed discharge resources and tacrolimus 2.5mg BID and prednisone 5mg daily. No current SE. Tac levels have been followed by transplant clinic, last level drawn just before hospital discharge.       Current labs include:  BP Readings from Last 3 Encounters:   01/25/18 137/63   01/15/18 156/70   12/25/17 134/51     Today's Vitals: There were no vitals taken for this visit. - phone visit  Lab Results   Component Value Date    A1C 6.3 01/12/2018   .  Lab Results   Component Value Date    CHOL 164 12/05/2016     Lab Results   Component Value Date    TRIG 272 12/05/2016     Lab Results   Component Value Date    HDL 46 12/05/2016     Lab Results   Component Value Date    LDL 64 12/05/2016       Liver Function Studies -   Recent Labs   Lab Test  01/20/18   0720   PROTTOTAL  7.1   ALBUMIN  2.6*   BILITOTAL  0.4   ALKPHOS  106   AST  22   ALT  20       Lab Results   Component Value Date    UCRR 43 11/16/2017    MICROL 1850 04/26/2017    UMALCR 6776.56 (H) 04/26/2017       Last Basic Metabolic Panel:  Lab Results   Component Value Date     01/24/2018      Lab Results   Component Value Date    POTASSIUM 5.1 01/24/2018     Lab Results   Component Value Date    CHLORIDE 107 01/24/2018     Lab Results   Component Value Date    BUN 18 01/24/2018     Lab Results   Component Value Date    CR 1.11 01/24/2018     GFR Estimate   Date Value Ref Range Status   01/24/2018 48 (L) >60 mL/min/1.7m2 Final     Comment:     Non  GFR Calc   01/22/2018 64 >60 mL/min/1.7m2 Final     Comment:     Non  GFR Calc   01/21/2018 52 (L) >60 mL/min/1.7m2 Final     Comment:     Non  GFR Calc     GFR Estimate If Black   Date Value Ref Range Status   01/24/2018 59 (L) >60 mL/min/1.7m2 Final     Comment:      GFR Calc   01/22/2018 78 >60 mL/min/1.7m2 Final     Comment:      GFR Calc   01/21/2018 63 >60 mL/min/1.7m2 Final     Comment:      GFR Calc     TSH   Date Value Ref Range Status    01/19/2018 1.31 0.40 - 4.00 mU/L Final   ]    Most Recent Immunizations   Administered Date(s) Administered     HepB 01/13/2003     Influenza (H1N1) 11/25/2009     Influenza (High Dose) 3 valent vaccine 10/07/2016     Influenza (IIV3) PF 11/05/2011     Influenza Vaccine IM 3yrs+ 4 Valent IIV4 10/16/2013     Pneumo Conj 13-V (2010&after) 12/01/2015     Pneumococcal 23 valent 06/17/2013     TDAP Vaccine (Adacel) 06/17/2013     Zoster vaccine, live 06/17/2013       ASSESSMENT:                 Current medications were reviewed today.      Medication Adherence: no issues identified    seizure: stable. Will continue on Keppra    diabetes: needs improvement. A1c at goal <8% however most recent BS readings are all elevated. Sliding scale alone not recommended per [place] list; will start back on lantus with conservative dosing. Will also adjust Novolog sliding scale (daytime only) to improve BS control. Continue low sliding scale at bedtime.    neuropathy: stable. Continue to monitor off tramadol and oxcarbazepine.     S/p kidney transplant: stable. Last tac within goal range. Pt will f/u with transplant clinic for labs    PLAN:                  Restart Lantus 5 units daily  Increase Novolog during the daytime to the following sliding scale:   140-239 2 units   240-349 4 units   340-449 6 units   Over 440 8 units  Continue bedtime Novolog sliding scale:   140-239 1 unit   240-349 2 units   340-439 3 units   Over 440 4 units    I spent 40 minutes with this patient today. All changes were made via collaborative practice agreement with Marivel Barcenas. A copy of the visit note was provided to the patient's primary care provider.    Will follow up in 1-2 weeks by phone.    The patient was sent via [employer] a summary of these recommendations as an after visit summary.    Conchita Toeldo, PharmD, BCACP    transportation as appropriate  - Identify discharge learning needs (meds, wound care, etc )  - Arrange for interpretive services to assist at discharge as needed  - Refer to Case Management Department for coordinating discharge planning if the patient needs post-hospital services based on physician/advanced practitioner order or complex needs related to functional status, cognitive ability, or social support system  Outcome: Progressing     Problem: POSTPARTUM  Goal: Experiences normal postpartum course  Description: INTERVENTIONS:  - Monitor maternal vital signs  - Assess uterine involution and lochia  Outcome: Progressing  Goal: Appropriate maternal -  bonding  Description: INTERVENTIONS:  - Identify family support  - Assess for appropriate maternal/infant bonding   -Encourage maternal/infant bonding opportunities  - Referral to  or  as needed  Outcome: Progressing  Goal: Establishment of infant feeding pattern  Description: INTERVENTIONS:  - Assess breast/bottle feeding  - Refer to lactation as needed  Outcome: Progressing  Goal: Incision(s), wounds(s) or drain site(s) healing without S/S of infection  Description: INTERVENTIONS  - Assess and document dressing, incision, wound bed, drain sites and surrounding tissue  - Provide patient and family education  - Perform skin care/dressing changes every   Outcome: Progressing

## 2022-09-29 NOTE — CASE MANAGEMENT
Case Management Discharge Planning Note    Patient name Adi Jade  Location /-27 MRN [id number]  : 1995 Date 2022       Current Admission Date: 2022  Current Admission Diagnosis:Status post primary low transverse  section   Patient Active Problem List    Diagnosis Date Noted    Chiari I malformation ([address] )     Persistent headaches 2022    Status post primary low transverse  section 2022    Gestational hypertension 2022    Hx of migraines 2022      LOS (days): 4  Geometric Mean LOS (GMLOS) (days):   Days to GMLOS:     OBJECTIVE:  Risk of Unplanned Readmission Score: 6 61         Current admission status: Inpatient   Preferred Pharmacy:   [facility]  [place] [id number]  Phone: [phone] Fax: [phone]    Primary Care Provider: Elmer Trivedi MD    Primary Insurance: Suzi Bark  Secondary Insurance: Edilma Eldridge    DISCHARGE DETAILS:       Freedom of Choice: Yes                        Requested  [address]         Is the patient interested in [address] at discharge?: No    DME Referral Provided  Referral made for DME?: Yes  DME referral completed for the following items[de-identified] Other (breast pump)  DME Supplier Name[de-identified] [employer]    Other Referral/Resources/Interventions Provided:  Interventions: DME  Referral Comments: Range order sent to [facility] for Zomee pump   Anticipate delivery to pt room pending insurance approval

## 2022-09-29 NOTE — LACTATION NOTE
This note was copied from a baby's chart  Checked in on Adam who is breastfeeding her baby girl  She states that baby is latching well, but baby did have some formula during the night so that she could rest      Discussed risks for early supplementation: over feeding, longer digestion times, engorgement for mom, lower milk supply for mom, and nipple confusion  Ana Paula Guzmán states that she will probably send the baby to the nursery again tonight but she will hand express/manual pump after feeds today, so baby can have her expressed breast milk in nursery  Encouraged her to call with additional questions or for a latch assessment as needed

## 2022-09-29 NOTE — PROGRESS NOTES
Progress Note - OB/GYN  Jean Renteria 24 y o  female MRN: [id number]  Unit/Bed#: -01 Encounter: [id number]    Assessment and Plan     Jean Renteria is a patient of: [facility]   She is POD#2 from 1LTCS for persistent category 2 heart tracing  Recovering well and is stable       Hx of migraines  Assessment & Plan  Monitor symptoms    Gestational hypertension  Assessment & Plan  CBC, CMP wnl, P:C 1 45  Systolic (72NDN), EJT:878 , Min:93 , CTR:970     Diastolic (00AEJ), BNP:20, Min:50, Max:67        * Status post primary low transverse  section  Assessment & Plan  QBL: 584, Hgb 11 8-> 11 2   DVT ppx (BMI 39): Lovenox 40mg daily ordered       Disposition    - Anticipate discharge home on POD# 2 vs 3      Subjective/Objective     Chief Complaint: Postpartum State     Subjective:    Jean Renteria is PPD/POD#2 s/p 1LTCS  She has no current complaints  Pain is well controlled  Patient is currently voiding  She is ambulating  Patient is currently passing flatus and has had no bowel movement  She is tolerating PO, and denies nausea or vomitting  Patient denies fever, chills, chest pain, shortness of breath, or calf tenderness  Lochia is normal  She is  Breastfeeding  She is recovering well and is stable         Vitals:   /67 (BP Location: Left arm)   Pulse 78   Temp 98 2 °F (36 8 °C) (Oral)   Resp 20   Ht 5' 5" (1 651 m)   Wt 108 kg (239 lb)   LMP 2022   SpO2 96%   Breastfeeding Yes   BMI 39 77 kg/m²       Intake/Output Summary (Last 24 hours) at 2022 5991  Last data filed at 2022 1430  Gross per 24 hour   Intake --   Output 750 ml   Net -750 ml       Invasive Devices  Timeline    Peripheral Intravenous Line  Duration           Peripheral IV 22 Right;Ventral (anterior) Forearm 3 days          Intrauterine Pressure Catheter  Duration           Intrauterine Pressure Catheter 22 0618 2 days                Physical Exam:   GEN: Jean Renteria appears well, alert and oriented x 3, pleasant and cooperative   CARDIO: RRR, no murmurs or rubs  RESP:  CTAB, no wheezes or rales  ABDOMEN: soft, no tenderness, no distention, fundus @ U-2, Incision C/D/I  EXTREMITIES: SCDs on, non tender, no erythema      Labs:     Hemoglobin   Date Value Ref Range Status   09/28/2022 11 3 (L) 11 5 - 15 4 g/dL Final   09/25/2022 12 1 11 5 - 15 4 g/dL Final     WBC   Date Value Ref Range Status   09/28/2022 19 13 (H) 4 31 - 10 16 Thousand/uL Final   09/25/2022 12 42 (H) 4 31 - 10 16 Thousand/uL Final     Platelets   Date Value Ref Range Status   09/28/2022 232 149 - 390 Thousands/uL Final   09/25/2022 256 149 - 390 Thousands/uL Final     Creatinine   Date Value Ref Range Status   09/25/2022 0 50 (L) 0 60 - 1 30 mg/dL Final     Comment:     Standardized to IDMS reference method   09/15/2022 0 60 0 60 - 1 30 mg/dL Final     Comment:     Standardized to IDMS reference method     AST   Date Value Ref Range Status   09/25/2022 10 (L) 13 - 39 U/L Final     Comment:     Specimen collection should occur prior to Sulfasalazine administration due to the potential for falsely depressed results  09/15/2022 10 (L) 13 - 39 U/L Final     Comment:     Specimen collection should occur prior to Sulfasalazine administration due to the potential for falsely depressed results  ALT   Date Value Ref Range Status   09/25/2022 8 7 - 52 U/L Final     Comment:     Specimen collection should occur prior to Sulfasalazine administration due to the potential for falsely depressed results  09/15/2022 9 7 - 52 U/L Final     Comment:     Specimen collection should occur prior to Sulfasalazine administration due to the potential for falsely depressed results             Precilla Dry  9/29/2022  7:02 AM

## 2022-09-30 VITALS
HEART RATE: 75 BPM | RESPIRATION RATE: 20 BRPM | HEIGHT: 65 IN | TEMPERATURE: 97.5 F | WEIGHT: 239 LBS | DIASTOLIC BLOOD PRESSURE: 66 MMHG | OXYGEN SATURATION: 98 % | BODY MASS INDEX: 39.82 KG/M2 | SYSTOLIC BLOOD PRESSURE: 127 MMHG

## 2022-09-30 PROCEDURE — 99024 POSTOP FOLLOW-UP VISIT: CPT | Performed by: STUDENT IN AN ORGANIZED HEALTH CARE EDUCATION/TRAINING PROGRAM

## 2022-09-30 RX ORDER — ACETAMINOPHEN 325 MG/1
650 TABLET ORAL EVERY 6 HOURS SCHEDULED
Refills: 0
Start: 2022-09-30

## 2022-09-30 RX ORDER — DOCUSATE SODIUM 100 MG/1
100 CAPSULE, LIQUID FILLED ORAL 2 TIMES DAILY
Qty: 30 CAPSULE | Refills: 0 | Status: SHIPPED | OUTPATIENT
Start: 2022-09-30

## 2022-09-30 RX ORDER — OXYCODONE HYDROCHLORIDE 5 MG/1
5 TABLET ORAL EVERY 4 HOURS PRN
Qty: 10 TABLET | Refills: 0 | Status: SHIPPED | OUTPATIENT
Start: 2022-09-30 | End: 2022-10-10

## 2022-09-30 RX ORDER — IBUPROFEN 600 MG/1
600 TABLET ORAL EVERY 6 HOURS
Qty: 30 TABLET | Refills: 0 | Status: SHIPPED | OUTPATIENT
Start: 2022-09-30

## 2022-09-30 RX ADMIN — OXYCODONE HYDROCHLORIDE 5 MG: 5 TABLET ORAL at 08:30

## 2022-09-30 RX ADMIN — ENOXAPARIN SODIUM 40 MG: 40 INJECTION SUBCUTANEOUS at 08:27

## 2022-09-30 RX ADMIN — ACETAMINOPHEN 650 MG: 325 TABLET ORAL at 02:23

## 2022-09-30 RX ADMIN — IBUPROFEN 600 MG: 600 TABLET ORAL at 13:29

## 2022-09-30 RX ADMIN — IBUPROFEN 600 MG: 600 TABLET ORAL at 05:58

## 2022-09-30 RX ADMIN — DOCUSATE SODIUM 100 MG: 100 CAPSULE, LIQUID FILLED ORAL at 08:27

## 2022-09-30 RX ADMIN — IBUPROFEN 600 MG: 600 TABLET ORAL at 00:00

## 2022-09-30 RX ADMIN — ACETAMINOPHEN 650 MG: 325 TABLET ORAL at 08:27

## 2022-09-30 NOTE — PLAN OF CARE
Problem: PAIN - ADULT  Goal: Verbalizes/displays adequate comfort level or baseline comfort level  Description: Interventions:  - Encourage patient to monitor pain and request assistance  - Assess pain using appropriate pain scale  - Administer analgesics based on type and severity of pain and evaluate response  - Implement non-pharmacological measures as appropriate and evaluate response  - Consider cultural and social influences on pain and pain management  - Notify physician/advanced practitioner if interventions unsuccessful or patient reports new pain  Outcome: Progressing     Problem: INFECTION - ADULT  Goal: Absence or prevention of progression during hospitalization  Description: INTERVENTIONS:  - Assess and monitor for signs and symptoms of infection  - Monitor lab/diagnostic results  - Monitor all insertion sites, i e  indwelling lines, tubes, and drains  - Monitor endotracheal if appropriate and nasal secretions for changes in amount and color  - [place] appropriate cooling/warming therapies per order  - Administer medications as ordered  - Instruct and encourage patient and family to use good hand hygiene technique  - Identify and instruct in appropriate isolation precautions for identified infection/condition  Outcome: Progressing  Goal: Absence of fever/infection during neutropenic period  Description: INTERVENTIONS:  - Monitor WBC    Outcome: Progressing     Problem: SAFETY ADULT  Goal: Patient will remain free of falls  Description: INTERVENTIONS:  - Educate patient/family on patient safety including physical limitations  - Instruct patient to call for assistance with activity   - Consult OT/PT to assist with strengthening/mobility   - Keep Call bell within reach  - Keep bed low and locked with side rails adjusted as appropriate  - Keep care items and personal belongings within reach  - Initiate and maintain comfort rounds  - Make Fall Risk Sign visible to staff  - Apply yellow socks and bracelet for high fall risk patients  - Consider moving patient to room near nurses station  Outcome: Progressing  Goal: Maintain or return to baseline ADL function  Description: INTERVENTIONS:  -  Assess patient's ability to carry out ADLs; assess patient's baseline for ADL function and identify physical deficits which impact ability to perform ADLs (bathing, care of mouth/teeth, toileting, grooming, dressing, etc )  - Assess/evaluate cause of self-care deficits   - Assess range of motion  - Assess patient's mobility; develop plan if impaired  - Assess patient's need for assistive devices and provide as appropriate  - Encourage maximum independence but intervene and supervise when necessary  - Involve family in performance of ADLs  - Assess for home care needs following discharge   - Consider OT consult to assist with ADL evaluation and planning for discharge  - Provide patient education as appropriate  Outcome: Progressing  Goal: Maintains/Returns to pre admission functional level  Description: INTERVENTIONS:  - Perform BMAT or MOVE assessment daily    - Set and communicate daily mobility goal to care team and patient/family/caregiver     - Collaborate with rehabilitation services on mobility goals if consulted  - Record patient progress and toleration of activity level   Outcome: Progressing     Problem: DISCHARGE PLANNING  Goal: Discharge to home or other facility with appropriate resources  Description: INTERVENTIONS:  - Identify barriers to discharge w/patient and caregiver  - Arrange for needed discharge resources and transportation as appropriate  - Identify discharge learning needs (meds, wound care, etc )  - Arrange for interpretive services to assist at discharge as needed  - Refer to Case Management Department for coordinating discharge planning if the patient needs post-hospital services based on physician/advanced practitioner order or complex needs related to functional status, cognitive ability, or social support system  Outcome: Progressing     Problem: POSTPARTUM  Goal: Experiences normal postpartum course  Description: INTERVENTIONS:  - Monitor maternal vital signs  - Assess uterine involution and lochia  Outcome: Progressing  Goal: Appropriate maternal -  bonding  Description: INTERVENTIONS:  - Identify family support  - Assess for appropriate maternal/infant bonding   -Encourage maternal/infant bonding opportunities  - Referral to  or  as needed  Outcome: Progressing  Goal: Establishment of infant feeding pattern  Description: INTERVENTIONS:  - Assess breast/bottle feeding  - Refer to lactation as needed  Outcome: Progressing  Goal: Incision(s), wounds(s) or drain site(s) healing without S/S of infection  Description: INTERVENTIONS  - Assess and document dressing, incision, wound bed, drain sites and surrounding tissue  - Provide patient and family education  - Perform skin care/dressing changes   Outcome: Progressing

## 2022-09-30 NOTE — LACTATION NOTE
This note was copied from a baby's chart  Met with mother to go over discharge breastfeeding booklet including the feeding log  Emphasized 8 or more (12) feedings in a 24 hour period, what to expect for the number of diapers per day of life and the progression of properties of the  stooling pattern  Reviewed breastfeeding and your lifestyle, storage and preparation of breast milk, how to keep you breast pump clean, the employed breastfeeding mother and paced bottle feeding handouts  Booklet included Breastfeeding Resources for after discharge including access to the number for the [address]  Discussed s/s engorgement and how to manage with medications, additional feedings at the breast or pumping sessions as needed, and cool compresses as well as s/s and management of mastitis and when to contact physician  Reviewed booklet and feeding log, addressed questions related to DC teaching  Enc family to continue to feed the baby on demand, look for signs of effective breastfeed like audible swallows, strong but comfortable tugging while latched, breasts softening (after milk comes in), baby falling asleep and releasing the breast, and meeting daily diaper goals

## 2022-09-30 NOTE — PLAN OF CARE
Problem: PAIN - ADULT  Goal: Verbalizes/displays adequate comfort level or baseline comfort level  Description: Interventions:  - Encourage patient to monitor pain and request assistance  - Assess pain using appropriate pain scale  - Administer analgesics based on type and severity of pain and evaluate response  - Implement non-pharmacological measures as appropriate and evaluate response  - Consider cultural and social influences on pain and pain management  - Notify physician/advanced practitioner if interventions unsuccessful or patient reports new pain  9/30/2022 1339 by Nasrin Raines RN  Outcome: Completed  9/30/2022 0719 by Nasrin Raines RN  Outcome: Adequate for Discharge

## 2022-09-30 NOTE — PROGRESS NOTES
Progress Note - OB/GYN  Jean Renteria 24 y o  female MRN: [id number]  Unit/Bed#: -01 Encounter: [id number]    Assessment and Plan     Jean Renteria is a patient of: [facility]   She is POD# 3 s/p 1LTCS  Recovering well and is stable       Hx of migraines  Assessment & Plan  Monitor symptoms    Gestational hypertension  Assessment & Plan  CBC, CMP wnl, P:C 8 53  Systolic (70MPG), JBN:343 , Min:104 , LBS:490     Diastolic (84YVH), LVQ:17, Min:59, Max:79        * Status post primary low transverse  section  Assessment & Plan  QBL: 584, Hgb 11 8-> 11 2   DVT ppx (BMI 39): Lovenox 40mg daily ordered       Disposition    - Anticipate discharge home today      Subjective/Objective     Chief Complaint: Postoperative State     Subjective:    Jean Renteria is s/p 1LTCS  She is POD# 3  She has no current complaints  Pain is well controlled  Patient is currently voiding  She is ambulating  Patient is currently passing flatus and has had bowel movement  She is tolerating PO, and denies nausea or vomitting  Patient denies fever, chills, chest pain, shortness of breath, or calf tenderness  Lochia is minimal  Her incision is C/D/I  She is recovering well and is stable         Vitals:   /79 (BP Location: Left arm)   Pulse 78   Temp 98 1 °F (36 7 °C) (Oral)   Resp 16   Ht 5' 5" (1 651 m)   Wt 108 kg (239 lb)   LMP 2022   SpO2 98%   Breastfeeding Yes   BMI 39 77 kg/m²     No intake or output data in the 24 hours ending 22 0710    Invasive Devices  Timeline    Intrauterine Pressure Catheter  Duration           Intrauterine Pressure Catheter 22 0618 3 days                Physical Exam:   GEN: Jean Renteria appears well, alert and oriented x 3, pleasant and cooperative   CARDIO: RRR, no murmurs or rubs  RESP:  CTAB, no wheezes or rales  ABDOMEN: soft, no tenderness, no distention, fundus firm, Incision C/D/I  EXTREMITIES: SCDs on, Negative Daniel's sign bilaterally      Labs:     Hemoglobin   Date Value Ref Range Status   09/28/2022 11 3 (L) 11 5 - 15 4 g/dL Final   09/25/2022 12 1 11 5 - 15 4 g/dL Final     WBC   Date Value Ref Range Status   09/28/2022 19 13 (H) 4 31 - 10 16 Thousand/uL Final   09/25/2022 12 42 (H) 4 31 - 10 16 Thousand/uL Final     Platelets   Date Value Ref Range Status   09/28/2022 232 149 - 390 Thousands/uL Final   09/25/2022 256 149 - 390 Thousands/uL Final     Creatinine   Date Value Ref Range Status   09/25/2022 0 50 (L) 0 60 - 1 30 mg/dL Final     Comment:     Standardized to IDMS reference method   09/15/2022 0 60 0 60 - 1 30 mg/dL Final     Comment:     Standardized to IDMS reference method     AST   Date Value Ref Range Status   09/25/2022 10 (L) 13 - 39 U/L Final     Comment:     Specimen collection should occur prior to Sulfasalazine administration due to the potential for falsely depressed results  09/15/2022 10 (L) 13 - 39 U/L Final     Comment:     Specimen collection should occur prior to Sulfasalazine administration due to the potential for falsely depressed results  ALT   Date Value Ref Range Status   09/25/2022 8 7 - 52 U/L Final     Comment:     Specimen collection should occur prior to Sulfasalazine administration due to the potential for falsely depressed results  09/15/2022 9 7 - 52 U/L Final     Comment:     Specimen collection should occur prior to Sulfasalazine administration due to the potential for falsely depressed results             Terri Posey  9/30/2022  7:10 AM

## 2022-10-03 ENCOUNTER — TELEPHONE (OUTPATIENT)
Dept: OBGYN CLINIC | Facility: CLINIC | Age: 27
End: 2022-10-03

## 2022-10-03 LAB — PLACENTA IN STORAGE: NORMAL

## 2022-10-03 NOTE — UTILIZATION REVIEW
Inpatient Admission Authorization Request   Notification of Maternity/Delivery &  Birth Information for Admission   SERVICING FACILITY:   [address] [facility] [address] [place] [address]  Tax ID: [id number]  NPI: [id number]  Place of [facility]  [address]  Place of Service Code: 24     ATTENDING PROVIDER:  Attending Name and NPI#: Petra Arturo Hester [[id number]]  Address[facility] [address] [place] [address]  Phone: [phone]     UTILIZATION REVIEW CONTACT:  Nadine Amaro Utilization Review Supervisor  Network Utilization Review Department  Phone: [phone]  Fax [phone]  Email: [email or web address]     PHYSICIAN ADVISORY SERVICES:  FOR CCAN-IM-MLNG REVIEW - MEDICAL NECESSITY DENIAL  Phone: [phone]  Fax: [phone]  Email: [email or web address]     TYPE OF REQUEST:  Inpatient Status     ADMISSION INFORMATION:  ADMISSION DATE/TIME: 22  9:17 PM  PATIENT DIAGNOSIS CODE/DESCRIPTION:  Encounter for induction of labor [Z34 90]  Gestational hypertension [O13 9]  37 weeks gestation of pregnancy [Z3A 37]  Encounter for  delivery without indication [O82] The primary encounter diagnosis was Postpartum care and examination of lactating mother  Diagnoses of 37 weeks gestation of pregnancy, Fetal intolerance to labor, delivered, current hospitalization, Non-reassuring fetal heart rate or rhythm affecting management of mother, and Status post primary low transverse  section were also pertinent to this visit  1  Postpartum care and examination of lactating mother    2  37 weeks gestation of pregnancy    3  Fetal intolerance to labor, delivered, current hospitalization    4  Non-reassuring fetal heart rate or rhythm affecting management of mother    5   Status post primary low transverse  section      DISCHARGE DATE/TIME: 2022  2:15 PM   MOTHER AND  INFORMATION:  Mother: Damien Mae Greg Dove 1997   Delivering clinician: [facility]   OB History        3    Para   1    Term   1            AB   2    Living   1       SAB   2    IAB        Ectopic        Multiple   0    Live Births   1               [place] Name & MRN:   Information for the patient's :  Lili Newton [[id number]]      Delivery Information:  Sex: female  Delivered 2022 1:54 PM by , Low Transverse; Gestational Age: 44w3d     Measurements:  Weight: 6 lb 3 8 oz (2830 g); Height: 18 5"    APGAR 1 minute 5 minutes 10 minutes   Totals: 8 9       Birth Information: 24 y o  female MRN: [id number] Unit/Bed#: -01 Estimated Date of Delivery: 10/15/22  Birthweight: No birth weight on file  Gestational Age: <None> Delivery Type: , Low Transverse    IMPORTANT INFORMATION:  Please contact Titus Hutchison directly with any questions or concerns regarding this request  Department voicemails are confidential     Send requests for admission clinical reviews, concurrent reviews, approvals, and administrative denials due to lack of clinical to fax [phone]

## 2022-10-06 ENCOUNTER — TELEPHONE (OUTPATIENT)
Dept: OBGYN CLINIC | Facility: CLINIC | Age: 27
End: 2022-10-06

## 2022-10-06 ENCOUNTER — POSTPARTUM VISIT (OUTPATIENT)
Dept: OBGYN CLINIC | Facility: CLINIC | Age: 27
End: 2022-10-06

## 2022-10-06 VITALS
HEIGHT: 65 IN | BODY MASS INDEX: 37.15 KG/M2 | DIASTOLIC BLOOD PRESSURE: 76 MMHG | SYSTOLIC BLOOD PRESSURE: 122 MMHG | WEIGHT: 223 LBS

## 2022-10-06 DIAGNOSIS — Z98.891 STATUS POST PRIMARY LOW TRANSVERSE CESAREAN SECTION: Primary | ICD-10-CM

## 2022-10-06 PROCEDURE — PNV: Performed by: OBSTETRICS & GYNECOLOGY

## 2022-10-06 NOTE — PROGRESS NOTES
Post-Partum Checkup Visit    James Valerio is a 24 y o  [id number] female     Assessment:  Delivered a female  (6lb 3 8oz) via 1LTCS due to fetal intolerance doing well today  Plan:      Problem List Items Addressed This Visit        Unprioritized    Status post primary low transverse  section - Primary     Breastfeeding: Yes  Incision check done  BP wnl  RTO in 3-5 weeks for PP appointment                 Subjective/Objective     Subjective:    Pain: With changes in position; especially on right sid3  Tolerating Oral Intake: yes  Voiding: yes  Flatus: yes  Bowel Movement: yes  Ambulating: yes  Breastfeeding: Breastfeeding  Chest Pain: no  Shortness of Breath: no  Leg Pain/Discomfort: no  Lochia: minimal     Objective:   Vitals:  Vitals:    10/06/22 1301   BP: 122/76       Physical Exam:  Physical Exam  Vitals reviewed  Exam conducted with a chaperone present  Constitutional:       General: She is not in acute distress  Appearance: She is not ill-appearing, toxic-appearing or diaphoretic  Cardiovascular:      Rate and Rhythm: Normal rate  Pulmonary:      Effort: Pulmonary effort is normal  No respiratory distress  Abdominal:      General: There is no distension  Palpations: Abdomen is soft  There is no mass  Tenderness: There is no abdominal tenderness  There is no guarding or rebound  Comments: Incision clean, dry, and intact without any evidence of infection, erythema, or bleeding  Surgical glue removed without difficulty  Skin closed   Skin:     General: Skin is warm and dry  Neurological:      Mental Status: She is alert and oriented to person, place, and time  Mental status is at baseline  Psychiatric:         Mood and Affect: Mood normal          Behavior: Behavior normal          Thought Content:  Thought content normal          Judgment: Judgment normal            OB Hx:  OB History    Para Term  AB Living   3 1 1 0 2 1   SAB IAB Ectopic Multiple Live Births   2 0 0 0 1      # Outcome Date GA Lbr Sadiq/2nd Weight Sex Delivery Anes PTL Lv   3 Term 22 37w3d  2830 g (6 lb 3 8 oz) F CS-LTranv   LAVONNE      Name: Freddy Patel (Cherlyn Solan) Lord: 8  Apgar5: 9   2 2019           1 2017             Medical Hx  Past Medical History:   Diagnosis Date    Encounter for induction of labor 2022    Migraine     Miscarriage      Surgical Hx  Past Surgical History:   Procedure Laterality Date    MA  DELIVERY ONLY N/A 2022    Procedure:  SECTION (); Surgeon: Lucy Limon MD;  Location: [facility] ;  Service: Obstetrics    TONSILLECTOMY      WISDOM TOOTH EXTRACTION       Family Hx  Family History   Problem Relation Age of Onset    Diabetes Mother     Diabetes Maternal Grandmother     Cancer Maternal Grandfather         prostate? colon?      Social Hx  Social History     Socioeconomic History    Marital status: Single     Spouse name: Not on file    Number of children: Not on file    Years of education: Not on file    Highest education level: Not on file   Occupational History    Not on file   Tobacco Use    Smoking status: Never Smoker    Smokeless tobacco: Never Used   Vaping Use    Vaping Use: Never used   Substance and Sexual Activity    Alcohol use: Not Currently    Drug use: Never    Sexual activity: Not Currently     Partners: Male   Other Topics Concern    Not on file   Social History Narrative    Not on file     Social Determinants of Health     Financial Resource Strain: Not on file   Food Insecurity: Not on file   Transportation Needs: Not on file   Physical Activity: Not on file   Stress: Not on file   Social Connections: Not on file   Intimate Partner Violence: Not on file   Housing Stability: Not on file     Allergies  Allergies   Allergen Reactions    Latex Hives and Rash    Adhesive [Medical Tape] Rash     Redness, rash, possible Latex allergy as well       Priti CLAY  Marylin Beebe MD  OB/GYN  10/6/2022  1:29 PM

## 2022-10-17 ENCOUNTER — TELEPHONE (OUTPATIENT)
Dept: PEDIATRICS CLINIC | Facility: CLINIC | Age: 27
End: 2022-10-17

## 2022-10-17 NOTE — TELEPHONE ENCOUNTER
Patient delivered on 9/27/2022 and baby is currently being seen at [facility] in [address] with Dr August

## 2022-10-26 NOTE — PROGRESS NOTES
Postpartum Visit  Glendy Rothman MD    10/28/22      Cesar Kumar is a 24 y o  [id number] female who presents for a postpartum visit  She is s/p CS at 37w3d on 22  She delivered a female   Breastfeeding and doing well  Incision: rare discomfort, no leakage or bleeding  Lochia is no bleeding  Bowel function is normal    Bladder function is normal    Patient has not been sexually active  Desired contraception method is previously had nexplanon (hard time getting out) doesn't want IUD, didn't really like depoprovera    Bruce score: 7    Gestational Diabetes: NA  Gestational HTN/Preeclampsia: gHTN  Pregnancy Complications: NA    The following portions of the patient's history were reviewed and updated as appropriate: She  has a past medical history of Encounter for induction of labor (2022), Migraine, and Miscarriage  She  has a past surgical history that includes Tonsillectomy; [place] tooth extraction; and pr  delivery only (N/A, 2022)  She  reports that she has never smoked  She has never used smokeless tobacco  She reports previous alcohol use  She reports that she does not use drugs    Current Outpatient Medications on File Prior to Visit   Medication Sig   • acetaminophen (TYLENOL) 325 mg tablet Take 3 tablets (975 mg total) by mouth every 8 (eight) hours as needed for mild pain, headaches or fever   • acetaminophen (TYLENOL) 325 mg tablet Take 2 tablets (650 mg total) by mouth every 6 (six) hours (Patient not taking: Reported on 10/6/2022)   • cyclobenzaprine (FLEXERIL) 10 mg tablet Take 1 tablet (10 mg total) by mouth 3 (three) times a day as needed for muscle spasms (headache) (Patient not taking: Reported on 10/6/2022)   • cyproheptadine (PERIACTIN) 4 mg tablet Take 1 tablet (4 mg total) by mouth 3 (three) times a day   • docusate sodium (COLACE) 100 mg capsule Take 1 capsule (100 mg total) by mouth 2 (two) times a day   • ibuprofen (MOTRIN) 600 mg tablet Take 1 tablet (600 mg total) by mouth every 6 (six) hours   • magnesium oxide (MAG-OX) 400 mg Take 1 tablet (400 mg total) by mouth 2 (two) times a day (Patient not taking: Reported on 10/6/2022)   • Prenatal Vit-Fe Fumarate-FA (PRENATAL VITAMIN PO) Take by mouth   • Riboflavin 400 MG CAPS Take 1 capsule (400 mg total) by mouth daily (Patient not taking: Reported on 10/6/2022)   • [DISCONTINUED] metoclopramide (Reglan) 10 mg tablet Take 1 tablet (10 mg total) by mouth every 6 (six) hours as needed (headache) (Patient not taking: Reported on 10/6/2022)     No current facility-administered medications on file prior to visit  She is allergic to latex and adhesive [medical tape]         Current Outpatient Medications:   •  metoclopramide (Reglan) 10 mg tablet, Take 1 tablet (10 mg total) by mouth every 6 (six) hours as needed (headache), Disp: 30 tablet, Rfl: 0  •  norethindrone (Kenna) 0 35 MG tablet, Take 1 tablet (0 35 mg total) by mouth daily, Disp: 84 tablet, Rfl: 1  •  acetaminophen (TYLENOL) 325 mg tablet, Take 3 tablets (975 mg total) by mouth every 8 (eight) hours as needed for mild pain, headaches or fever, Disp: , Rfl: 0  •  acetaminophen (TYLENOL) 325 mg tablet, Take 2 tablets (650 mg total) by mouth every 6 (six) hours (Patient not taking: Reported on 10/6/2022), Disp: , Rfl: 0  •  cyclobenzaprine (FLEXERIL) 10 mg tablet, Take 1 tablet (10 mg total) by mouth 3 (three) times a day as needed for muscle spasms (headache) (Patient not taking: Reported on 10/6/2022), Disp: 30 tablet, Rfl: 0  •  cyproheptadine (PERIACTIN) 4 mg tablet, Take 1 tablet (4 mg total) by mouth 3 (three) times a day, Disp: 60 tablet, Rfl: 0  •  docusate sodium (COLACE) 100 mg capsule, Take 1 capsule (100 mg total) by mouth 2 (two) times a day, Disp: 30 capsule, Rfl: 0  •  ibuprofen (MOTRIN) 600 mg tablet, Take 1 tablet (600 mg total) by mouth every 6 (six) hours, Disp: 30 tablet, Rfl: 0  •  magnesium oxide (MAG-OX) 400 mg, Take 1 tablet (400 mg total) by mouth 2 (two) times a day (Patient not taking: Reported on 10/6/2022), Disp: 60 tablet, Rfl: 3  •  Prenatal Vit-Fe Fumarate-FA (PRENATAL VITAMIN PO), Take by mouth, Disp: , Rfl:   •  Riboflavin 400 MG CAPS, Take 1 capsule (400 mg total) by mouth daily (Patient not taking: Reported on 10/6/2022), Disp: 90 capsule, Rfl: 3    Allergies   Allergen Reactions   • Latex Hives and Rash   • Adhesive [Medical Tape] Rash     Redness, rash, possible Latex allergy as well       Review of Systems  Constitutional: no fever, feels well  Breasts: no complaints of breast pain, breast lump, or nipple discharge  Gastrointestinal: no complaints nausea, vomiting  Genitourinary: as noted in HPI  Neurological: no complaints of headache    Objective    /74 (BP Location: Left arm, Patient Position: Sitting, Cuff Size: Standard)   Ht 5' 5" (1 651 m)   Wt 103 kg (228 lb)   LMP 01/08/2022   Breastfeeding Yes   BMI 37 94 kg/m²   Physical Exam  Constitutional:       Appearance: Normal appearance  She is normal weight  HENT:      Head: Normocephalic  Eyes:      Extraocular Movements: Extraocular movements intact  Conjunctiva/sclera: Conjunctivae normal    Pulmonary:      Effort: Pulmonary effort is normal    Abdominal:      General: Abdomen is flat  There is no distension  Palpations: Abdomen is soft  Tenderness: There is no abdominal tenderness  There is no guarding  Comments: Incision clean, dry, intact without erythema, induration, bleeding or discharge, no ttp, appropriate  1 5cm firm nodule to left of umbilicus, mobile, non-tender   Musculoskeletal:         General: Normal range of motion  Cervical back: Normal range of motion  Skin:     General: Skin is warm and dry  Neurological:      General: No focal deficit present  Psychiatric:         Mood and Affect: Mood normal          Behavior: Behavior normal          Thought Content:  Thought content normal  Assessment/Plan:  Keesha Rojas is a 24 y o  who is postpartum from a CS with a postpartum examination notable for small, mobile nodule  1  Contraception: POPs  2  Annual exam due in May; Last Pap : 05/2022 NILM   3  Increase activity as tolerated, may resume all normal activity    4  Abdominal wall ultrasound for left periumbilical nodule

## 2022-10-28 ENCOUNTER — POSTPARTUM VISIT (OUTPATIENT)
Dept: OBGYN CLINIC | Facility: CLINIC | Age: 27
End: 2022-10-28

## 2022-10-28 VITALS
HEIGHT: 65 IN | DIASTOLIC BLOOD PRESSURE: 74 MMHG | BODY MASS INDEX: 37.99 KG/M2 | WEIGHT: 228 LBS | SYSTOLIC BLOOD PRESSURE: 124 MMHG

## 2022-10-28 DIAGNOSIS — Z30.09 GENERAL COUNSELLING AND ADVICE ON CONTRACEPTION: ICD-10-CM

## 2022-10-28 DIAGNOSIS — O13.9 GESTATIONAL HYPERTENSION, ANTEPARTUM: ICD-10-CM

## 2022-10-28 DIAGNOSIS — R22.2 SUBCUTANEOUS NODULE OF ABDOMINAL WALL: ICD-10-CM

## 2022-10-28 DIAGNOSIS — G44.229 CHRONIC TENSION-TYPE HEADACHE, NOT INTRACTABLE: ICD-10-CM

## 2022-10-28 DIAGNOSIS — R51.9 PERSISTENT HEADACHES: ICD-10-CM

## 2022-10-28 RX ORDER — ACETAMINOPHEN AND CODEINE PHOSPHATE 120; 12 MG/5ML; MG/5ML
1 SOLUTION ORAL DAILY
Qty: 84 TABLET | Refills: 1 | Status: SHIPPED | OUTPATIENT
Start: 2022-10-28

## 2022-10-28 RX ORDER — METOCLOPRAMIDE 10 MG/1
10 TABLET ORAL EVERY 6 HOURS PRN
Qty: 30 TABLET | Refills: 0 | Status: SHIPPED | OUTPATIENT
Start: 2022-10-28

## 2022-11-02 ENCOUNTER — HOSPITAL ENCOUNTER (OUTPATIENT)
Dept: ULTRASOUND IMAGING | Facility: CLINIC | Age: 27
Discharge: HOME | End: 2022-11-02

## 2022-11-02 ENCOUNTER — TELEPHONE (OUTPATIENT)
Dept: OBGYN CLINIC | Facility: CLINIC | Age: 27
End: 2022-11-02

## 2022-11-02 DIAGNOSIS — R22.2 LOCALIZED SWELLING, MASS AND LUMP, TRUNK: Primary | ICD-10-CM

## 2022-11-02 DIAGNOSIS — R22.2 SUBCUTANEOUS NODULE OF ABDOMINAL WALL: ICD-10-CM

## 2022-11-02 DIAGNOSIS — Z09 FOLLOW-UP EXAM: ICD-10-CM

## 2022-11-02 NOTE — TELEPHONE ENCOUNTER
Received call from [facility] radiology to report findings on US pt completed  Sent to provider to review

## 2022-11-02 NOTE — TELEPHONE ENCOUNTER
Reviewed US results with Dr Argenis Monique and reviewed with pt area of focal fat necrosis noted and recommendations to repeat US 6mo-1yr for resolution/stability/changes  Pt aware if increased pain or any other concerns to call and possible sooner US to evaluate change in size  Pt verbalizes understanding and has no questions at this time  Order placed  Pt states has central scheduling number to call and schedule US, and pt verbalizes understanding to call if any questions or concerns

## 2022-11-14 ENCOUNTER — DOCUMENTATION (OUTPATIENT)
Dept: OBGYN CLINIC | Facility: CLINIC | Age: 27
End: 2022-11-14

## 2022-11-14 ENCOUNTER — TELEPHONE (OUTPATIENT)
Dept: OBGYN CLINIC | Facility: CLINIC | Age: 27
End: 2022-11-14

## 2022-11-14 NOTE — TELEPHONE ENCOUNTER
Pt lmom - looking for a doctor's note stating that Daysi Reeder was at birth of child on 9/27 for his fmla

## 2023-02-06 ENCOUNTER — TELEPHONE (OUTPATIENT)
Dept: NEUROLOGY | Facility: CLINIC | Age: 28
End: 2023-02-06

## 2023-02-06 NOTE — TELEPHONE ENCOUNTER
Pt called to reschedule her appt, I offered her 2-8-23 at 9 am with Telly Maravilla and she accepted

## 2023-02-08 ENCOUNTER — OFFICE VISIT (OUTPATIENT)
Dept: NEUROLOGY | Facility: CLINIC | Age: 28
End: 2023-02-08

## 2023-02-08 VITALS
TEMPERATURE: 97.6 F | HEIGHT: 65 IN | SYSTOLIC BLOOD PRESSURE: 141 MMHG | BODY MASS INDEX: 37.2 KG/M2 | HEART RATE: 85 BPM | WEIGHT: 223.3 LBS | DIASTOLIC BLOOD PRESSURE: 86 MMHG

## 2023-02-08 DIAGNOSIS — G43.719 INTRACTABLE CHRONIC MIGRAINE WITHOUT AURA AND WITHOUT STATUS MIGRAINOSUS: Primary | ICD-10-CM

## 2023-02-08 DIAGNOSIS — G93.5 CHIARI I MALFORMATION (HCC): ICD-10-CM

## 2023-02-08 PROBLEM — Z3A.32 32 WEEKS GESTATION OF PREGNANCY: Status: ACTIVE | Noted: 2023-02-08

## 2023-02-08 RX ORDER — LANOLIN ALCOHOL/MO/W.PET/CERES
CREAM (GRAM) TOPICAL
Qty: 90 TABLET | Refills: 3 | Status: SHIPPED | OUTPATIENT
Start: 2023-02-08

## 2023-02-08 NOTE — PATIENT INSTRUCTIONS
Headache management instructions  - When patient has a moderate to severe headache, they should seek rest, initiate relaxation and apply cold compresses to the head  - Maintain regular sleep schedule  Adults need at least 7-8 hours of uninterrupted a night  - Limit over the counter medications such as Tylenol, Ibuprofen, Aleve, Excedrin  (No more than 2- 3 times a week or max 10 a month)  - Maintain headache diary  Free JAMIL for a smart phone, which can be used is "Migraine jozef"  - Limit caffeine to 1-2 cups 8 to 16 oz a day or less  - Avoid dietary trigger  (aged cheese, peanuts, MSG, aspartame and nitrates)  - Patient is to have regular frequent meals to prevent headache onset  - Please drink at least 64 ounces of water a day to help remain hydrated  Botox Therapy  Important Information    Our goal is to make sure you fully understand how Botox Therapy treatment may benefit you and to help you understand how you can play an active role in your treatments and ongoing care  Please review the following information below  Call our office IMMEDIATELY @ [phone] and speak to one of our Botox Coordinators if you have a change in insurance  (a prior authorization is required and accurate information is vital)  Please call at least 24 hours in advance if you can't make your appointment  Appointments are scheduled every 91 days (this will be scheduled in advance before leaving the office)  You must allow for at least 2-3 treatments to determine if Botox is right for you  It may take a few weeks to see a response from treatment  No hair dye or scalp massage or treatment within 24 hours of treatment  We encourage you to use a headache diary or journal to document your headache frequency and severity   You can also utilize the Migraine Jozef jamil (downloadable on [employer])  Sign up for the Botox Savings Program  (commercial insurance patients may qualify) Sign up at [employer]savingsprogram com or call [phone] Option: 4  To get more information on Botox therapy for Chronic Migraines and see frequently asked questions, please visit [facility]  If you have any questions or concerns, please speak to one of our Botox Coordinators at [phone]  We look forward to servicing you!

## 2023-02-08 NOTE — PROGRESS NOTES
Patient ID: Natan Schmidt is a 24 y o  female  Assessment/Plan:       Diagnoses and all orders for this visit:    Intractable chronic migraine without aura and without status migrainosus  -     magnesium oxide 400 mg TABS; 1 tab daily  -     Riboflavin 400 MG CAPS; Take 1 capsule (400 mg total) by mouth daily  -     Rimegepant Sulfate (NURTEC) 75 MG TBDP; 1 tab at migraine onset  No more than one dose per 24 hours  -     Chemodenervation; Future    Chiari I malformation ([facility] )         The patient has a history of migraine headaches which were much worse in teens, and now less severe/ but more frequent  She has a constant headache which never goes away, which is consistent with migraine as it is unilateral and throbbing, when it is worse she has nausea with light and sound sensitivity  She has tried several preventatives and still has a daily migraine headache  She is breast-feeding so our options are limited until she stops breast-feeding  She was agreeable to Botox for prevention  S/e reviewed  She will resume magnesium and Riboflavin  She will hold periactin as this causes too much sedation for her  I added Nurtec PRN, which does have studies suggesting safety in BF with very little amount in breast milk  S/e reviewed  Tylenol PRN okay but no more than 3 doses per week to prevent medication overuse headache  She can call if emergent TPIs are needed  Information packet on migraines provided including food triggers and other possible triggers  She will keep a journal through [facility]  The patient should not hesitate to call me prior to her follow up with any questions or concerns  Subjective:    HPI     Ms Natan Schmidt is a pleasant 23 yo female here for neurological follow up after hospital admission for migraines in pregnancy        The patient has had migraine headaches for several years since she was a teenager, they improved in frequency until she became pregnant and then seemed to worsen in pregnancy  She states that she just did not have anything to take for them due to pregnancy  She did see neurology at [facility] in the past, once in 2017, and a second time for nonepileptic events in 2018  She has a history of a total of 3 nonepileptic events which were consistent with convulsions of all 4 extremities and syncope, and the patient had no recollection of the events  They were thought to be related to stress or vasovagal in nature  One event was prior to a tonsillectomy  They did not recur since   She was admitted 2022 for prolonged headache at 32 weeks gestation, and seen by neurology inpatient  There is no suggestion for preeclampsia  It was noted that she was on multiple different medications for migraines during her pregnancy and overall it was felt that cyproheptadine was possibly the most effective  She did have a  10/25/2022, uneventful  Her baby girl Angel is healthy  The patient did not continue cyproheptadine because it made her very tired  Since her daughter was born her headaches are persistent and daily, and she thinks a contributing factor may be insomnia due to being up at night frequently with her new infant  Migraines: Onset: Teens- at this time they were more severe  Head injury-none  Current pain: 4/10  Reaches pain level at worst: 7/10 most recently, has reach 10/10 in the remote past when she had emesis with them; no longer has emesis with the HA    Frequency: every day    Duration: constant HA, which fluctuates in severity t/o the day; states if doing too much activity they get worse    She has not missed work from them, but they does get HAs frequently at work; states sometimes 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 and turn the music down due to this being a trigger      Location: R temple, radiates across the front, L temple (used to be more often L); typically they are unilateral    Quality: sharp, throbbing    Not worse with cough, sneeze, etc     Associated with: nausea sometimes, used to have emesis, but not recently, photophobia, phonophobia, dizziness sometimes    Triggers: loud music, planning on keeping a journal to identify any other triggers    Aura/ warning: Denies    Medications tried:  Prevention-  Venlafaxine  Periactin  Magnesium  B2  Flexeril    Abortive-  Tylenol  Imitrex injectable  Advil  Reglan    Other non-medication therapies or treatments- Chiropractor    Neck pain and description: Occasionally has neck pain bilaterally consistent with muscle pain, no myelopathic signs or symptoms  She saw her chiropractor this past Monday and this provides relief but only temporary for 1 to 2 days  Sleep concerns: No sleep concerns, except that she is up throughout the night with her     Family hx of migraines: no, maybe older sister  Family hx of cerebral aneurysms: Denies    Lifestyle:  She states she is good with her water intake  She drinks at least 60 ounces throughout the day  She does keep granola bars with her and tries not to miss meals  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, 3 days/week  Her  works night shift so she is home alone with her infant overnight  No drugs or alcohol  The patient did resume her birth control pill norethindrone daily  Inpatient notes reviewed from this past 2022, also indicating that "MRV head unremarkable for dural venous sinus thrombosis  MRI brain unremarkable for acute infarct, however did demonstrate low lying cerebellar tonsils concerning for Chiari I malformation without evidence of hydrocephalus, previously diagnoed in 2018 per discussion with patient  Unlikely Chiari I malformation is attributing to headache    Suspicion for atypical migraine with cervicogenic component, likely in the setting of hormonal changes and fluid shifts of pregnancy   Low suspicion for preeclamptic origin per discussion with maternal fetal medicine "    Prior neurology note Uli/ Samaria: "Per ED visit Dr Beatriz Parr on 9/11/18: "Joel Olguin is a 26year old female who presents the ED after seizure-like activity several days ago  Patient states she was in [facility]  with her sister and had just gotten her belly button pierced  As she was walking down the door lock, she felt her vision tunneling in and told her sister she needed to sit down  She put her head on her sister shoulder, and her sister states that she had convulsions  She describes the convulsions as some jerking of her arms which lasted around 20 seconds  When she woke up, she was immediately awake, alert, and oriented  She describes a similar experience after getting a blood draw several years ago, but does not know how long she was having twitching at that time  She states that after that time she was also awake, alert, and oriented immediately after  She denies any bladder or bowel incontinence during either episode  Currently, she only minutes to a mild headache "  Allen Diego Patient denies a family history of seizures, febrile seizures, CNS infections, concussions or TBI    Regarding headaches: She reports QOD migraine headaches 9/10, lasting hours to weeks at a time, associated with nausea, phono/photophobia  She has been prescribed Sumatriptan 50mg as rescue therapy, She takes Venlafaxine 37 5mg daily as preventative medication  Has been taking this for one month  Prescribed by Boyd Maravilla "    The following portions of the patient's history were reviewed and updated as appropriate:   She  has a past medical history of Encounter for induction of labor (09/25/2022), Migraine, and Miscarriage    She   Patient Active Problem List    Diagnosis Date Noted   • 32 weeks gestation of pregnancy 02/08/2023   • Intractable chronic migraine without aura and without status migrainosus 02/08/2023   • Chiari I malformation (HCC)    • Persistent headaches 2022   • Status post primary low transverse  section 2022   • Gestational hypertension 2022   • Hx of migraines 2022     She  has a past surgical history that includes Tonsillectomy; [place] tooth extraction; and pr  delivery only (N/A, 2022)  Her family history includes Cancer in her maternal grandfather; Diabetes in her maternal grandmother and mother  She  reports that she has never smoked  She has never used smokeless tobacco  She reports that she does not currently use alcohol  She reports that she does not use drugs  Current Outpatient Medications   Medication Sig Dispense Refill   • acetaminophen (TYLENOL) 325 mg tablet Take 3 tablets (975 mg total) by mouth every 8 (eight) hours as needed for mild pain, headaches or fever  0   • magnesium oxide 400 mg TABS 1 tab daily 90 tablet 3   • Prenatal Vit-Fe Fumarate-FA (PRENATAL VITAMIN PO) Take by mouth     • Riboflavin 400 MG CAPS Take 1 capsule (400 mg total) by mouth daily 90 capsule 3   • Rimegepant Sulfate (NURTEC) 75 MG TBDP 1 tab at migraine onset  No more than one dose per 24 hours  8 tablet 0   • norethindrone (Kenna) 0 35 MG tablet Take 1 tablet (0 35 mg total) by mouth daily 84 tablet 1     No current facility-administered medications for this visit  She is allergic to latex and adhesive [medical tape]            Objective:    Blood pressure 141/86, pulse 85, temperature 97 6 °F (36 4 °C), temperature source Temporal, height 5' 5" (1 651 m), weight 101 kg (223 lb 4 8 oz), currently breastfeeding  Body mass index is 37 16 kg/m²  Physical Exam    Neurological Exam  Vital signs reviewed  Well developed, well nourished  Speech is fluent and articulate  Mood and affect are pleasant  Head: Normocephalic, atraumatic  Neck: Neck flexors 5/5  CN 2-12: intact and symmetric, including EOMs which are normal b/l and PERRL  Fundi b/l are normal to crude ophthalmological examination  MSK: 5/5 t/o   ROM normal x all 4 extr  No pronator drift  Sensation: Inact to LT x4 extr  Romberg negative  Reflexes: 2+ and symmetric in all 4 extr  Coordination: Nml x4 extr  Gait: Steady normal gait, tandem gait is steady  ROS:    Review of Systems   Constitutional: Negative  Negative for appetite change and fever  HENT: Negative  Negative for hearing loss, tinnitus, trouble swallowing and voice change  Eyes: Negative  Negative for photophobia, pain and visual disturbance  Respiratory: Negative  Negative for shortness of breath  Cardiovascular: Negative  Negative for palpitations  Gastrointestinal: Negative  Negative for nausea and vomiting  Endocrine: Negative  Negative for cold intolerance  Genitourinary: Negative  Negative for dysuria, frequency and urgency  Musculoskeletal: Negative  Negative for gait problem, myalgias and neck pain  Skin: Negative  Negative for rash  Allergic/Immunologic: Negative  Neurological: Positive for headaches  Negative for dizziness, tremors, seizures, syncope, facial asymmetry, speech difficulty, weakness, light-headedness and numbness  Hematological: Negative  Does not bruise/bleed easily  Psychiatric/Behavioral: Negative  Negative for confusion, hallucinations and sleep disturbance  The following portions of the patient's history were reviewed and updated as appropriate: allergies, current medications/ medication history, past family history, past medical history, past social history, past surgical history and problem list     Review of systems was reviewed and otherwise unremarkable from a neurological perspective

## 2023-02-28 ENCOUNTER — TELEPHONE (OUTPATIENT)
Dept: NEUROLOGY | Facility: CLINIC | Age: 28
End: 2023-02-28

## 2023-02-28 NOTE — TELEPHONE ENCOUNTER
----- Message from Savanah Collazo PA-C sent at 2/8/2023 11:38 AM EST -----  At your earliest convenience, please auth botox for CM, 200 units  Thanks  Patient is a new start to Botox, as per requested for Botox you would like to start this patient on:     Botox 200 UNITS  Qty  1  DX G43 [id number]  Sig: Inject up to 200 UNITS I M into the various sites in the head and neck once every three months for one year with  Refills: 3     If you agree to this order transcribed above please respond directly if you agree or not, so I may proceed further with authorization and for use of Verbal Order  Thank you

## 2023-03-01 NOTE — TELEPHONE ENCOUNTER
Patient PA [facility] via Fax/form  Will approval/denial details

## 2023-03-07 NOTE — TELEPHONE ENCOUNTER
Please call patient to see if she has been on any of these medications in the past   I did look through her chart, but I didn't see any    I will defer otherwise to [facility] when she returns as to what she discussed with patient if Botox was denied etc

## 2023-03-07 NOTE — TELEPHONE ENCOUNTER
Received fax from [facility] Mar[facility] White with denial details stating it was specifically denied due to no documentation of therapeutic failure, intolerance or contraindication to at least two of the following : One Beta Blocker, metroprolol, propranolol, timolol, atenolo, nadolol, Topiramate, Divalproex/sodium valporate, and Amitriptyline  I do see it is noted pt is breastfeeding as some are ok with breastfeeding and some are not such as Topirmate has a side effect of sedation and diarrhea  in the infant child       Please advise so I may send appeal

## 2023-03-08 ENCOUNTER — TELEPHONE (OUTPATIENT)
Dept: NEUROLOGY | Facility: CLINIC | Age: 28
End: 2023-03-08

## 2023-03-08 NOTE — TELEPHONE ENCOUNTER
Kayla Childs from [address] [place] [address] Dr has called requesting for an update on pt's Prior Auth Forms faxed 02/24/2023      ASPN Code# [id number]    Call Back # [phone]    Fax # [phone]    Thank you,     Shamika Ley

## 2023-05-17 NOTE — PROGRESS NOTES
[facility]   Annual Well Woman Exam  [place]    ASSESSMENT & PLAN: Nallely Shanks is a 24 y o  [id number] with normal gynecologic exam     1   Routine well woman exam done today  2   Cervical Cancer Screening: Pap with reflex HPV was not done today  Current ASCCP Guidelines reviewed  3   Nallely Shanks is low risk and does not need or desire STD testing  4   Gardasil is recommended for patients from 545 years of age  Dilcia received gardasil vaccine in 2018  5  Nallely Shanks is sexually active  She is using POPs for contraception, happy with method, and options have been discussed  6  The following were reviewed in today's visit: recently irregular periods--continue to monitor, call if recurrent pain, can consider ultrasound   7  Return to office in 12 months for annual, sooner PRN  All questions answered to the best of my ability  Subjective:    CC:  Annual Gynecologic Examination    HPI: Zuleika Landeros is a 24 y o  [id number] who presents for annual gynecologic examination       BG via CS on 9/27/22    Screening:  Pap: 5/6/22: NILM   2019: NILM  Gardasil: received in 2018      GYN  No unusual discharge  No current or recent pelvic pain  No dyspareunia  Menstrual cycles are usually regular (usually around the 10th of every month), but last two months didn't get a period at all  Then last month had isolated episode of severe pelvic pain, self resolved  About 3 weeks later had period, it was about 4-5 days, mild cramping, no menorraghia    Has a timer set for POP, so very rarely misses, thinks hasnt missed   Birth control: POP  Hx STI: denies     OB  [id number]     G/U  Complaints: denies  Denies hematuria, urinary incontinence and dysuria    Breast  Complaints: denies  Family hx: denies fhx of breast, uterine, ovarian, or colon cancers    Health Maintenance:    She has an active lifestyle---Running up and down stairs--on feet all day (tracking with apple watch!)  She does follow a well balanced diet--fruits and vegetables  She does not use tobacco  She is looking for another follow with a PCP  Past Medical History:   Diagnosis Date   • Encounter for induction of labor 2022   • Migraine    • Miscarriage        Past Surgical History:   Procedure Laterality Date   • HI  DELIVERY ONLY N/A 2022    Procedure:  SECTION (); Surgeon: Mendel Barrett, MD;  Location: [facility] ;  Service: Obstetrics   • TONSILLECTOMY     • WISDOM TOOTH EXTRACTION         Past OB/Gyn History:   Patient's last menstrual period was 2023  Family History:  Family History   Problem Relation Age of Onset   • Diabetes Mother    • Diabetes Maternal Grandmother    • Cancer Maternal Grandfather         prostate? colon?        Social History:  Social History     Socioeconomic History   • Marital status: Single     Spouse name: Not on file   • Number of children: Not on file   • Years of education: Not on file   • Highest education level: Not on file   Occupational History   • Not on file   Tobacco Use   • Smoking status: Never   • Smokeless tobacco: Never   Vaping Use   • Vaping Use: Never used   Substance and Sexual Activity   • Alcohol use: Not Currently   • Drug use: Never   • Sexual activity: Yes     Partners: Male     Birth control/protection: OCP, Condom Male   Other Topics Concern   • Not on file   Social History Narrative   • Not on file     Social Determinants of Health     Financial Resource Strain: Not on file   Food Insecurity: Not on file   Transportation Needs: Not on file   Physical Activity: Not on file   Stress: Not on file   Social Connections: Not on file   Intimate Partner Violence: Not on file   Housing Stability: Not on file       Allergies   Allergen Reactions   • Latex Hives and Rash   • Adhesive [Medical Tape] Rash     Redness, rash, possible Latex allergy as well       Current Outpatient Medications:   •  acetaminophen (TYLENOL) 325 mg tablet, Take 3 tablets (975 mg total) by mouth every 8 (eight) hours as needed for mild pain, headaches or fever, Disp: , Rfl: 0  •  magnesium Oxide (MAG-OX) 400 mg TABS, 1 tab daily, Disp: 90 tablet, Rfl: 3  •  norethindrone (MICRONOR) 0 35 MG tablet, Take 1 tablet (0 35 mg total) by mouth daily, Disp: 84 tablet, Rfl: 1  •  Riboflavin 400 MG CAPS, Take 1 capsule (400 mg total) by mouth daily, Disp: 90 capsule, Rfl: 3  •  Prenatal Vit-Fe Fumarate-FA (PRENATAL VITAMIN PO), Take by mouth (Patient not taking: Reported on 5/18/2023), Disp: , Rfl:     Review of Systems:  Denies chest pain, shortness of breath, abdominal pain, nausea, vomiting, urinary incontinence, urinary frequency, vaginal bleeding, vaginal discharge  All other systems negative unless otherwise stated  Physical Exam:  /76 (BP Location: Left arm, Patient Position: Sitting, Cuff Size: Standard)   Wt 98 9 kg (218 lb)   LMP 05/09/2023   Breastfeeding No   BMI 36 28 kg/m²  Body mass index is 36 28 kg/m²  GEN: The patient was alert and oriented x3, pleasant well-appearing female in no acute distress  HEENT:  Unremarkable, no anterior or posterior lymphadenopathy, no thyromegaly  CV:  Regular rate and rhythm, normal S1 and S2, no murmurs  RESP:  Clear to auscultation bilaterally, no wheezes, rales or rhonchi  BREAST:  Symmetric breasts with no palpable breast masses or obvious breast lesions  She has no retractions or nipple discharge  She has no axillary abnormalities or palpable masses  GI:  Soft, nontender, non-distended  MSK: bilateral lower extremities are nontender, no edema  : Normal appearing external female genitalia, normal appearing urethral meatus  On sterile speculum exam,  normal appearing vaginal epithelium, no vaginal discharge, no bleeding, grossly normal appearing cervix  On bimanual exam, no cervical motion tenderness; uterus is smooth, mobile, non-tender, normal size  No tenderness or fullness in the bilateral adnexa

## 2023-05-18 ENCOUNTER — ANNUAL EXAM (OUTPATIENT)
Dept: OBGYN CLINIC | Facility: CLINIC | Age: 28
End: 2023-05-18

## 2023-05-18 VITALS — BODY MASS INDEX: 36.28 KG/M2 | SYSTOLIC BLOOD PRESSURE: 126 MMHG | DIASTOLIC BLOOD PRESSURE: 76 MMHG | WEIGHT: 218 LBS

## 2023-05-18 DIAGNOSIS — G43.719 INTRACTABLE CHRONIC MIGRAINE WITHOUT AURA AND WITHOUT STATUS MIGRAINOSUS: ICD-10-CM

## 2023-05-18 DIAGNOSIS — Z01.419 WOMEN'S ANNUAL ROUTINE GYNECOLOGICAL EXAMINATION: Primary | ICD-10-CM

## 2023-05-18 DIAGNOSIS — Z30.09 GENERAL COUNSELLING AND ADVICE ON CONTRACEPTION: ICD-10-CM

## 2023-05-18 RX ORDER — LANOLIN ALCOHOL/MO/W.PET/CERES
CREAM (GRAM) TOPICAL
Qty: 90 TABLET | Refills: 3 | Status: SHIPPED | OUTPATIENT
Start: 2023-05-18

## 2023-05-18 RX ORDER — ACETAMINOPHEN AND CODEINE PHOSPHATE 120; 12 MG/5ML; MG/5ML
1 SOLUTION ORAL DAILY
Qty: 84 TABLET | Refills: 1 | Status: SHIPPED | OUTPATIENT
Start: 2023-05-18

## 2023-07-10 ENCOUNTER — HOSPITAL ENCOUNTER (OUTPATIENT)
Dept: ULTRASOUND IMAGING | Facility: HOSPITAL | Age: 28
Discharge: HOME | End: 2023-07-10
Payer: COMMERCIAL

## 2023-07-10 DIAGNOSIS — Z09 FOLLOW-UP EXAM: ICD-10-CM

## 2023-07-10 DIAGNOSIS — R22.2 LOCALIZED SWELLING, MASS AND LUMP, TRUNK: ICD-10-CM

## 2023-07-10 PROCEDURE — 76705 ECHO EXAM OF ABDOMEN: CPT

## 2023-08-01 ENCOUNTER — TELEPHONE (OUTPATIENT)
Dept: OBGYN CLINIC | Facility: CLINIC | Age: 28
End: 2023-08-01

## 2023-08-01 NOTE — TELEPHONE ENCOUNTER
Patient called, left message on answering machine, requesting to schedule an appointment. Please call to schedule.

## 2023-08-28 ENCOUNTER — APPOINTMENT (OUTPATIENT)
Dept: LAB | Facility: CLINIC | Age: 28
End: 2023-08-28
Payer: COMMERCIAL

## 2023-08-28 ENCOUNTER — INITIAL PRENATAL (OUTPATIENT)
Dept: OBGYN CLINIC | Facility: CLINIC | Age: 28
End: 2023-08-28
Payer: COMMERCIAL

## 2023-08-28 VITALS
BODY MASS INDEX: 36.96 KG/M2 | WEIGHT: 221.8 LBS | SYSTOLIC BLOOD PRESSURE: 124 MMHG | HEIGHT: 65 IN | DIASTOLIC BLOOD PRESSURE: 82 MMHG

## 2023-08-28 DIAGNOSIS — Z34.91 FIRST TRIMESTER PREGNANCY: ICD-10-CM

## 2023-08-28 DIAGNOSIS — Z34.91 FIRST TRIMESTER PREGNANCY: Primary | ICD-10-CM

## 2023-08-28 LAB
ABO GROUP BLD: NORMAL
B-HCG SERPL-ACNC: ABNORMAL MIU/ML (ref 0–5)
BASOPHILS # BLD AUTO: 0.07 THOUSANDS/ÂΜL (ref 0–0.1)
BASOPHILS NFR BLD AUTO: 1 % (ref 0–1)
BLD GP AB SCN SERPL QL: NEGATIVE
EOSINOPHIL # BLD AUTO: 0.15 THOUSAND/ÂΜL (ref 0–0.61)
EOSINOPHIL NFR BLD AUTO: 2 % (ref 0–6)
ERYTHROCYTE [DISTWIDTH] IN BLOOD BY AUTOMATED COUNT: 13.2 % (ref 11.6–15.1)
HCT VFR BLD AUTO: 40.5 % (ref 34.8–46.1)
HGB BLD-MCNC: 14 G/DL (ref 11.5–15.4)
IMM GRANULOCYTES # BLD AUTO: 0.03 THOUSAND/UL (ref 0–0.2)
IMM GRANULOCYTES NFR BLD AUTO: 0 % (ref 0–2)
LYMPHOCYTES # BLD AUTO: 1.68 THOUSANDS/ÂΜL (ref 0.6–4.47)
LYMPHOCYTES NFR BLD AUTO: 20 % (ref 14–44)
MCH RBC QN AUTO: 29.2 PG (ref 26.8–34.3)
MCHC RBC AUTO-ENTMCNC: 34.6 G/DL (ref 31.4–37.4)
MCV RBC AUTO: 84 FL (ref 82–98)
MONOCYTES # BLD AUTO: 0.78 THOUSAND/ÂΜL (ref 0.17–1.22)
MONOCYTES NFR BLD AUTO: 9 % (ref 4–12)
NEUTROPHILS # BLD AUTO: 5.66 THOUSANDS/ÂΜL (ref 1.85–7.62)
NEUTS SEG NFR BLD AUTO: 68 % (ref 43–75)
NRBC BLD AUTO-RTO: 0 /100 WBCS
PLATELET # BLD AUTO: 274 THOUSANDS/UL (ref 149–390)
PMV BLD AUTO: 10.9 FL (ref 8.9–12.7)
PROGEST SERPL-MCNC: 19.96 NG/ML
RBC # BLD AUTO: 4.8 MILLION/UL (ref 3.81–5.12)
RH BLD: POSITIVE
SPECIMEN EXPIRATION DATE: NORMAL
WBC # BLD AUTO: 8.37 THOUSAND/UL (ref 4.31–10.16)

## 2023-08-28 PROCEDURE — 36415 COLL VENOUS BLD VENIPUNCTURE: CPT

## 2023-08-28 PROCEDURE — 76817 TRANSVAGINAL US OBSTETRIC: CPT | Performed by: PHYSICIAN ASSISTANT

## 2023-08-28 PROCEDURE — 86901 BLOOD TYPING SEROLOGIC RH(D): CPT

## 2023-08-28 PROCEDURE — 84144 ASSAY OF PROGESTERONE: CPT

## 2023-08-28 PROCEDURE — 86850 RBC ANTIBODY SCREEN: CPT

## 2023-08-28 PROCEDURE — 84702 CHORIONIC GONADOTROPIN TEST: CPT

## 2023-08-28 PROCEDURE — T1001 NURSING ASSESSMENT/EVALUATN: HCPCS | Performed by: PHYSICIAN ASSISTANT

## 2023-08-28 PROCEDURE — 85025 COMPLETE CBC W/AUTO DIFF WBC: CPT

## 2023-08-28 PROCEDURE — 86900 BLOOD TYPING SEROLOGIC ABO: CPT

## 2023-08-28 NOTE — PROGRESS NOTES
Pt presents for initial OB appointment. Pap and PE up to date. Pt does note that she conceived while on POP. She did have an episode of light spotting around time of her + UPT but otherwise none since. MRSA: denies   Varicella: unsure  STD history: denies   Drug/alcohol use history: denies     US done in office today: TVUS done. GS c/w 8w2d + YS noted but no embryo visualized. Left ovarian cyst noted, no adnexal masses seen otherwise. PMHX:h/o migraines, h/o Chiari malformation 1    POBHX:  SAB x 2    PSURGHX: , T&A, wisdom teeth      All questions answered. Ultrasound Probe Disinfection    A transvaginal ultrasound was performed. Probe identification: probe serial number [facility]: [id number]  Disinfection process: Disinfection was performed with High Level Disinfection Process (Trophon)     Assessment:   Early pregnancy, threatened . Plan: Will plan HCG x 2 and prog and T&S. Will f/u based on results. If Rosario Gosselin continues to rise, will plan repeat scan in 10 days. Pt aware.      Kar Bahena PA-C

## 2023-08-29 DIAGNOSIS — Z34.90 EARLY STAGE OF PREGNANCY: Primary | ICD-10-CM

## 2023-08-30 ENCOUNTER — APPOINTMENT (OUTPATIENT)
Dept: LAB | Facility: CLINIC | Age: 28
End: 2023-08-30
Payer: COMMERCIAL

## 2023-08-30 DIAGNOSIS — Z34.90 EARLY STAGE OF PREGNANCY: ICD-10-CM

## 2023-08-30 LAB
B-HCG SERPL-ACNC: ABNORMAL MIU/ML (ref 0–5)
PROGEST SERPL-MCNC: 16.71 NG/ML

## 2023-08-30 PROCEDURE — 36415 COLL VENOUS BLD VENIPUNCTURE: CPT

## 2023-08-30 PROCEDURE — 84144 ASSAY OF PROGESTERONE: CPT

## 2023-08-30 PROCEDURE — 84702 CHORIONIC GONADOTROPIN TEST: CPT

## 2023-08-31 ENCOUNTER — TELEPHONE (OUTPATIENT)
Dept: OBGYN CLINIC | Facility: CLINIC | Age: 28
End: 2023-08-31

## 2023-08-31 NOTE — TELEPHONE ENCOUNTER
Called patient, pt states she was waiting for her results for further plan, as at appt she had reviewed with provider further levels and/or ultrasound would need to be completed for any determination. Pt verbalizes understanding of decrease in hcg levels. Pt states she has not had any bleeding or spotting. Pt states she has had some mild cramping, mostly on left side, took tylenol this morning that provided relief. Reviewed with pt if starts to experiencing bleeding, to call if bleeding through fully saturated pad ever hour, to call if increased or worsening cramping/pain, fever/chills, lightheaded, dizzy, cp, or sob. Pt aware will review with provider for further recommendations and will call pt back to review.

## 2023-09-05 ENCOUNTER — TELEPHONE (OUTPATIENT)
Dept: OBGYN CLINIC | Facility: CLINIC | Age: 28
End: 2023-09-05

## 2023-09-05 DIAGNOSIS — O20.0 THREATENED MISCARRIAGE IN EARLY PREGNANCY: Primary | ICD-10-CM

## 2023-09-05 NOTE — TELEPHONE ENCOUNTER
Patient needs an apt for an ultrasound in office if possible. Does not want an outside scan scan. Would like to be able to discuss results right away.

## 2023-09-06 ENCOUNTER — OFFICE VISIT (OUTPATIENT)
Dept: OBGYN CLINIC | Facility: CLINIC | Age: 28
End: 2023-09-06

## 2023-09-06 VITALS
HEIGHT: 65 IN | BODY MASS INDEX: 37.15 KG/M2 | SYSTOLIC BLOOD PRESSURE: 142 MMHG | DIASTOLIC BLOOD PRESSURE: 80 MMHG | WEIGHT: 223 LBS

## 2023-09-06 DIAGNOSIS — O03.9 MISCARRIAGE: Primary | ICD-10-CM

## 2023-09-06 PROBLEM — Z3A.32 32 WEEKS GESTATION OF PREGNANCY: Status: RESOLVED | Noted: 2023-02-08 | Resolved: 2023-09-06

## 2023-09-06 NOTE — PROGRESS NOTES
[facility]  Viability Scan  Jack Luther MD  25      Assessment/Plan:  Michelet Gonzales is a 25 yo  with LMP of 2024 with likely missed / blighted ovum considering no fetal pole with gestational sac measuring >25mm and not seen 9 days following yolk sac visualized as well as down trending bhcg. We reviewed diagnosis and Michelet Gonzales would like to complete one more bhcg. We briefly reviewed the management options which Michelet Gonzales is familiar with. Michelet Gonzales also qualifies now for further work up for the indication of recurrent pregnancy loss which we also touched upon and discussed some of the testing involved including TSH, HBA1C, Antiphospholipid testing. All questions form er and her partner were answered to the best of my ability. Diagnoses and all orders for this visit:    Miscarriage  -     hCG, quantitative; Future        Subjective:      Patient ID: Donavan Varma is a 24 y.o. female. HPI  Michelet Gonzales is a 25 yo  with an LMP of 2023 who presents for follow up viability scan- previously was seen on 2023 with gestational sac measuring 33mm with + yolk sac but no fetal pole. She completed bHCG which trended down from  114,712 to 96,864 in 48 hours. This pregnancy was conceived on micronor. Patient reports some increase in cramping but denies any vaginal bleeding. Nausea has improved. Michelet Gonzales has a history of 3 prior pregnancies- her last pregnancy she delivered via 1LTCS on 2022. Her other 2 pregnancies also resulted in miscarriages and she reports she had one managed medically and 1 surgically.     The following portions of the patient's history were reviewed and updated as appropriate: allergies, current medications, past family history, past medical history, past social history, past surgical history and problem list.    Review of Systems    Per HPI    Objective:    /80 (BP Location: Left arm, Patient Position: Sitting, Cuff Size: Standard)   Ht 5' 5" (1.651 m)   Wt 101 kg (223 lb)   LMP 07/02/2023   BMI 37.11 kg/m²      Physical Exam  Vitals reviewed. Constitutional:       General: She is not in acute distress. Appearance: Normal appearance. She is not ill-appearing or diaphoretic. Cardiovascular:      Rate and Rhythm: Normal rate. Pulmonary:      Effort: Pulmonary effort is normal. No respiratory distress. Abdominal:      Palpations: Abdomen is soft. Tenderness: There is no abdominal tenderness. There is no guarding or rebound. Genitourinary:     Comments: Vulav normal without lesions- on speculum exam cervix appears multiparous no bleeding, minimal mucoid discharge noted. Musculoskeletal:      Right lower leg: No edema. Left lower leg: No edema. Skin:     General: Skin is warm and dry. Neurological:      Mental Status: She is alert and oriented to person, place, and time. Psychiatric:         Mood and Affect: Mood normal.         Behavior: Behavior normal.         TVUS:   Gestational sac visualized measuring 42mm- a collapsed yolk sac measuring 5.2 mm was noted with no fetal pole visualized.

## 2023-09-06 NOTE — TELEPHONE ENCOUNTER
Placed call to patient, offered  this afternoon with Dr. Marguarite Ormond or 0830 am on 9/7/23 with Tiny Rodriguez. Patient is unable to make the 0830 on 9/7/23 due to work. Patient is going to try to make the  today, will call back if she is unable to make it.

## 2023-09-07 ENCOUNTER — APPOINTMENT (OUTPATIENT)
Dept: LAB | Facility: CLINIC | Age: 28
End: 2023-09-07
Payer: COMMERCIAL

## 2023-09-07 DIAGNOSIS — O03.9 MISCARRIAGE: ICD-10-CM

## 2023-09-07 LAB — B-HCG SERPL-ACNC: ABNORMAL MIU/ML (ref 0–5)

## 2023-09-07 PROCEDURE — 36415 COLL VENOUS BLD VENIPUNCTURE: CPT

## 2023-09-07 PROCEDURE — 84702 CHORIONIC GONADOTROPIN TEST: CPT

## 2023-09-08 ENCOUNTER — TELEPHONE (OUTPATIENT)
Dept: OBGYN CLINIC | Facility: CLINIC | Age: 28
End: 2023-09-08

## 2023-09-11 NOTE — TELEPHONE ENCOUNTER
Unfortunately, her bHCG has continue to downtrend. This is a nonviable pregnancy and therefore Feroz Jeong has the following options: expectant management, medical management with cytotec (and mife), or surgical management.

## 2023-09-13 ENCOUNTER — APPOINTMENT (OUTPATIENT)
Dept: LAB | Facility: CLINIC | Age: 28
End: 2023-09-13
Payer: COMMERCIAL

## 2023-09-13 ENCOUNTER — OFFICE VISIT (OUTPATIENT)
Dept: OBGYN CLINIC | Facility: CLINIC | Age: 28
End: 2023-09-13
Payer: COMMERCIAL

## 2023-09-13 VITALS — DIASTOLIC BLOOD PRESSURE: 82 MMHG | BODY MASS INDEX: 36.28 KG/M2 | WEIGHT: 218 LBS | SYSTOLIC BLOOD PRESSURE: 130 MMHG

## 2023-09-13 DIAGNOSIS — O02.1 MISSED ABORTION: Primary | ICD-10-CM

## 2023-09-13 DIAGNOSIS — O02.1 MISSED ABORTION: ICD-10-CM

## 2023-09-13 LAB
B-HCG SERPL-ACNC: ABNORMAL MIU/ML (ref 0–5)
PROGEST SERPL-MCNC: 12.91 NG/ML

## 2023-09-13 PROCEDURE — 99213 OFFICE O/P EST LOW 20 MIN: CPT | Performed by: PHYSICIAN ASSISTANT

## 2023-09-13 PROCEDURE — 84144 ASSAY OF PROGESTERONE: CPT

## 2023-09-13 PROCEDURE — 84702 CHORIONIC GONADOTROPIN TEST: CPT

## 2023-09-13 PROCEDURE — 36415 COLL VENOUS BLD VENIPUNCTURE: CPT

## 2023-09-13 NOTE — PROGRESS NOTES
Assessment/Plan:    No problem-specific Assessment & Plan notes found for this encounter. Problem List Items Addressed This Visit    None  Visit Diagnoses     Missed     -  Primary    Relevant Orders    hCG, quantitative    Progesterone          After through counseling pt will polan reoeat HCG and prog today. If she has had a significant decline will consider medical management tomorrow so she can hopefully be functional next week for work. If there has not been a significant drop, may be more inclined to plan D&E. Consents all signed today and PE done if she chooses D&E. Will f/u pending results later today or tomorrow am.       Subjective:      Patient ID: Anne Roberts is a 34 y.o. female. Pt presents to review options for loss. She has had conitnnued decrease in her HCG, although no bleeding as of yet. She is somewhat familiar with options. She has limited support at home and is most interested in planning D&E procedure to allow timing to be planned. She has a job working -Thursday next week and has limited back up at work. She works in . She also has a young child at home with limited help at home. We thoroughly reviewed all options including expectant management, medical management with cytotec and mifepristone and surgical management. The following portions of the patient's history were reviewed and updated as appropriate:   She  has a past medical history of Encounter for induction of labor (2022), Migraine, and Miscarriage.   She   Patient Active Problem List    Diagnosis Date Noted   • Intractable chronic migraine without aura and without status migrainosus 2023   • Chiari I malformation ([address])    • Persistent headaches 2022   • Status post primary low transverse  section 2022   • Gestational hypertension 2022   • Hx of migraines 2022     She  has a past surgical history that includes Tonsillectomy; [place] tooth extraction; and pr  delivery only (N/A, 2022). Her family history includes Cancer in her maternal grandfather; Diabetes in her maternal grandmother and mother. She  reports that she has never smoked. She has never used smokeless tobacco. She reports that she does not currently use alcohol. She reports that she does not use drugs. Current Outpatient Medications   Medication Sig Dispense Refill   • acetaminophen (TYLENOL) 325 mg tablet Take 3 tablets (975 mg total) by mouth every 8 (eight) hours as needed for mild pain, headaches or fever  0   • magnesium Oxide (MAG-OX) 400 mg TABS 1 tab daily (Patient not taking: Reported on 2023) 90 tablet 3   • norethindrone (MICRONOR) 0.35 MG tablet Take 1 tablet (0.35 mg total) by mouth daily 84 tablet 1   • Prenatal Vit-Fe Fumarate-FA (PRENATAL VITAMIN PO) Take by mouth (Patient not taking: Reported on 2023)     • Riboflavin 400 MG CAPS Take 1 capsule (400 mg total) by mouth daily (Patient not taking: Reported on 2023) 90 capsule 3     No current facility-administered medications for this visit. Current Outpatient Medications on File Prior to Visit   Medication Sig   • acetaminophen (TYLENOL) 325 mg tablet Take 3 tablets (975 mg total) by mouth every 8 (eight) hours as needed for mild pain, headaches or fever   • magnesium Oxide (MAG-OX) 400 mg TABS 1 tab daily (Patient not taking: Reported on 2023)   • norethindrone (MICRONOR) 0.35 MG tablet Take 1 tablet (0.35 mg total) by mouth daily   • Prenatal Vit-Fe Fumarate-FA (PRENATAL VITAMIN PO) Take by mouth (Patient not taking: Reported on 2023)   • Riboflavin 400 MG CAPS Take 1 capsule (400 mg total) by mouth daily (Patient not taking: Reported on 2023)     No current facility-administered medications on file prior to visit. She is allergic to latex and adhesive [medical tape]. .    Review of Systems   Constitutional: Negative. Respiratory: Negative.     Genitourinary: Negative for menstrual problem. Psychiatric/Behavioral: Negative. Objective:      /82 (BP Location: Left arm, Patient Position: Sitting, Cuff Size: Standard)   Wt 98.9 kg (218 lb)   LMP 07/02/2023   BMI 36.28 kg/m²          Physical Exam  Vitals reviewed. HENT:      Head: Normocephalic and atraumatic. Cardiovascular:      Rate and Rhythm: Normal rate and regular rhythm. Pulmonary:      Effort: Pulmonary effort is normal.      Breath sounds: Normal breath sounds. Musculoskeletal:         General: Normal range of motion. Right lower leg: No edema. Left lower leg: No edema. Skin:     General: Skin is warm and dry. Neurological:      Mental Status: She is alert. Psychiatric:         Mood and Affect: Mood normal.         Behavior: Behavior normal.         Thought Content:  Thought content normal.         Judgment: Judgment normal.

## 2023-09-18 ENCOUNTER — PATIENT MESSAGE (OUTPATIENT)
Dept: OBGYN CLINIC | Facility: CLINIC | Age: 28
End: 2023-09-18

## 2023-09-18 NOTE — PATIENT COMMUNICATION
Talked to patient she is scheduled for her D&E with Dr. Pascual Ferrer on 10/20/2023 at [facility] main OR. Patient aware that BMP is needed per prep for procedure and will go for this tomorrow am. Patient is also aware that the hospital will call her tomorrow after 2pm re when to report and what time.

## 2023-09-18 NOTE — PATIENT COMMUNICATION
Hi Dr. Marian Hancock,  :Would you e able to do this D&E this Wednesday am [facility] Main OR?

## 2023-09-18 NOTE — PATIENT COMMUNICATION
Spoke to patient, informed her our surgery scheduler is out of office until next week. Patient was hoping to move forward with the procedure sooner than later. If it absolutely needs to wait until next week, and it is safe for patient to wait (she still is not bleeding), she can wait, but would prefer not to.

## 2023-09-19 ENCOUNTER — APPOINTMENT (OUTPATIENT)
Dept: LAB | Facility: CLINIC | Age: 28
End: 2023-09-19
Payer: COMMERCIAL

## 2023-09-19 ENCOUNTER — ANESTHESIA EVENT (OUTPATIENT)
Dept: PERIOP | Facility: HOSPITAL | Age: 28
End: 2023-09-19
Payer: COMMERCIAL

## 2023-09-19 DIAGNOSIS — Z01.818 PRE-OP TESTING: ICD-10-CM

## 2023-09-19 LAB
ANION GAP SERPL CALCULATED.3IONS-SCNC: 8 MMOL/L
BUN SERPL-MCNC: 6 MG/DL (ref 5–25)
CALCIUM SERPL-MCNC: 9.7 MG/DL (ref 8.4–10.2)
CHLORIDE SERPL-SCNC: 106 MMOL/L (ref 96–108)
CO2 SERPL-SCNC: 23 MMOL/L (ref 21–32)
CREAT SERPL-MCNC: 0.66 MG/DL (ref 0.6–1.3)
GFR SERPL CREATININE-BSD FRML MDRD: 122 ML/MIN/1.73SQ M
GLUCOSE P FAST SERPL-MCNC: 77 MG/DL (ref 65–99)
POTASSIUM SERPL-SCNC: 3.8 MMOL/L (ref 3.5–5.3)
SODIUM SERPL-SCNC: 137 MMOL/L (ref 135–147)

## 2023-09-19 PROCEDURE — 80048 BASIC METABOLIC PNL TOTAL CA: CPT

## 2023-09-19 PROCEDURE — 36415 COLL VENOUS BLD VENIPUNCTURE: CPT

## 2023-09-19 PROCEDURE — NC001 PR NO CHARGE: Performed by: OBSTETRICS & GYNECOLOGY

## 2023-09-19 NOTE — H&P
Matt Stewart is a 32-year-old G4, P1 with downtrending hCG and intrauterine gestational sac with no visible fetal pole suspect blighted ovum/missed AB. Patient has been counseled the office regarding D&E procedure as well as risks and benefits expected outcomes and surgical instructions. Patient expressed desire to proceed. Conceived on H. WALTER Bustamante. Past Medical History:   Diagnosis Date   • Encounter for induction of labor 2022   • Migraine    • Miscarriage    • PONV (postoperative nausea and vomiting)    • Seizures ([address])     last one 4 years ago. Past Surgical History:   Procedure Laterality Date   • AR  DELIVERY ONLY N/A 2022    Procedure:  SECTION (); Surgeon: Vicky Richard MD;  Location: [facility] ;  Service: Obstetrics   • TONSILLECTOMY     • WISDOM TOOTH EXTRACTION       OB History        4    Para   1    Term   1            AB   2    Living   1       SAB   2    IAB        Ectopic        Multiple   0    Live Births   1                 Current Outpatient Medications   Medication Instructions   • acetaminophen (TYLENOL) 975 mg, Oral, Every 8 hours PRN   • magnesium Oxide (MAG-OX) 400 mg TABS 1 tab daily   • norethindrone (MICRONOR) 0.35 mg, Oral, Daily   • Prenatal Vit-Fe Fumarate-FA (PRENATAL VITAMIN PO) Oral   • Riboflavin 400 mg, Oral, Daily     Allergies   Allergen Reactions   • Latex Hives and Rash   • Adhesive [Medical Tape] Rash     Redness, rash, possible Latex allergy as well.  PAPER TAPE ok     Social History     Tobacco Use   • Smoking status: Never   • Smokeless tobacco: Never   Vaping Use   • Vaping Use: Never used   Substance Use Topics   • Alcohol use: Not Currently   • Drug use: Never     Physical exam height 5 foot 5 inches, weight 219 pounds (98.9 kg), BMI 36.28, /82  Head: Normocephalic atraumatic  Lungs: Clear to auscultation bilaterally  Heart: Regular rhythm S1-S2  Abdomen: Soft nontender positive bowel sounds organomegaly  Extremities normal warm well perfused no cyanosis clubbing or edema    Blood type a positive  Labs: September 13 quant 19, 797     assessment 34year-old G4, P1 with missed AB    Plan: D&E

## 2023-09-19 NOTE — ANESTHESIA PREPROCEDURE EVALUATION
Procedure:  DILATATION AND EVACUATION (D&E) ( 11 weeks) (Uterus)    Relevant Problems   ANESTHESIA   (+) PONV (postoperative nausea and vomiting)      CARDIO   (+) Gestational hypertension   (+) Intractable chronic migraine without aura and without status migrainosus      ENDO (within normal limits)      GI/HEPATIC (within normal limits)      /RENAL (within normal limits)      HEMATOLOGY (within normal limits)      NEURO/PSYCH   (+) Intractable chronic migraine without aura and without status migrainosus   (+) Persistent headaches      PULMONARY (within normal limits)      Nervous and Auditory   (+) Chiari I malformation (HCC)      Other   (+) Hx of migraines      Lab Results   Component Value Date    WBC 8.37 08/28/2023    HGB 14.0 08/28/2023    HCT 40.5 08/28/2023    MCV 84 08/28/2023     08/28/2023     Lab Results   Component Value Date    SODIUM 137 09/19/2023    K 3.8 09/19/2023     09/19/2023    CO2 23 09/19/2023    BUN 6 09/19/2023    CREATININE 0.66 09/19/2023    GLUC 97 09/25/2022    CALCIUM 9.7 09/19/2023     No results found for: "INR", "PROTIME"  Lab Results   Component Value Date    HGBA1C 5.2 07/11/2018            Physical Exam    Airway    Mallampati score: I  TM Distance: >3 FB  Neck ROM: full     Dental   No notable dental hx     Cardiovascular  Cardiovascular exam normal    Pulmonary  Pulmonary exam normal     Other Findings        Anesthesia Plan  ASA Score- 2     Anesthesia Type- general with ASA Monitors. Additional Monitors:   Airway Plan: ETT and LMA. Comment: TIVA given h/o PONV    Discussed with patient plan for anesthesia, as well as risks/benefits, including likely chance of PONV and sore throat, as well as rare possibilities of dental/oropharyngeal/ocular injuries, aspiration, and severe/life-threatening surgical and anesthetic emergencies. Patient expressed understanding and agreement. .       Plan Factors-Exercise tolerance (METS): >4 METS. Chart reviewed. Existing labs reviewed. Patient summary reviewed. Induction- intravenous. Postoperative Plan-     Informed Consent- Anesthetic plan and risks discussed with patient. I personally reviewed this patient with the CRNA. Discussed and agreed on the Anesthesia Plan with the CRNA. Rubia Rodas

## 2023-09-19 NOTE — PRE-PROCEDURE INSTRUCTIONS
Pre-Surgery Instructions:   Medication Instructions   • acetaminophen (TYLENOL) 325 mg tablet Uses PRN- OK to take day of surgery   • Prenatal Vit-Fe Fumarate-FA (PRENATAL VITAMIN PO) Hold day of surgery. Spoke with pt via phone. Medication instructions for day surgery reviewed. Please use only a sip of water to take your instructed medications. Avoid all over the counter vitamins, supplements and NSAIDS for one week prior to surgery per anesthesia guidelines. Tylenol is ok to take as needed. You will receive a call one business day prior to surgery with an arrival time and hospital directions. If your surgery is scheduled on a Monday, the hospital will be calling you on the Friday prior to your surgery. If you have not heard from anyone by 8pm, please call the hospital supervisor through the hospital  at [phone]. Taylor Mosqueda [phone]). Do not eat or drink anything after midnight the night before your surgery, including candy, mints, lifesavers, or chewing gum. Do not drink alcohol 24hrs before your surgery. Try not to smoke at least 24hrs before your surgery. Follow the pre surgery showering instructions as listed in the [facility] Surgical Experience Booklet” or otherwise provided by your surgeon's office. Do not shave the surgical area 24 hours before surgery. Do not apply any lotions, creams, including makeup, cologne, deodorant, or perfumes after showering on the day of your surgery. No contact lenses, eye make-up, or artificial eyelashes. Remove nail polish, including gel polish, and any artificial, gel, or acrylic nails if possible. Remove all jewelry including rings and body piercing jewelry. Wear causal clothing that is easy to take on and off. Consider your type of surgery. Keep any valuables, jewelry, piercings at home. Please bring any specially ordered equipment (sling, braces) if indicated.     Arrange for a responsible person to drive you to and from the hospital on the day of your surgery. Visitor Guidelines discussed. Call the surgeon's office with any new illnesses, exposures, or additional questions prior to surgery. Please reference your [facility] Surgical Experience Booklet” for additional information to prepare for your upcoming surgery.

## 2023-09-20 ENCOUNTER — ANESTHESIA (OUTPATIENT)
Dept: PERIOP | Facility: HOSPITAL | Age: 28
End: 2023-09-20
Payer: COMMERCIAL

## 2023-09-20 ENCOUNTER — HOSPITAL ENCOUNTER (OUTPATIENT)
Facility: HOSPITAL | Age: 28
Setting detail: OUTPATIENT SURGERY
Discharge: HOME | End: 2023-09-20
Attending: OBSTETRICS & GYNECOLOGY | Admitting: OBSTETRICS & GYNECOLOGY
Payer: COMMERCIAL

## 2023-09-20 VITALS
DIASTOLIC BLOOD PRESSURE: 62 MMHG | OXYGEN SATURATION: 96 % | RESPIRATION RATE: 15 BRPM | TEMPERATURE: 97.2 F | SYSTOLIC BLOOD PRESSURE: 112 MMHG | HEART RATE: 82 BPM

## 2023-09-20 DIAGNOSIS — O02.1 MISSED AB: ICD-10-CM

## 2023-09-20 PROCEDURE — 88305 TISSUE EXAM BY PATHOLOGIST: CPT | Performed by: PATHOLOGY

## 2023-09-20 RX ORDER — MAGNESIUM HYDROXIDE 1200 MG/15ML
LIQUID ORAL AS NEEDED
Status: DISCONTINUED | OUTPATIENT
Start: 2023-09-20 | End: 2023-09-20 | Stop reason: HOSPADM

## 2023-09-20 RX ORDER — LIDOCAINE HYDROCHLORIDE 10 MG/ML
INJECTION, SOLUTION EPIDURAL; INFILTRATION; INTRACAUDAL; PERINEURAL AS NEEDED
Status: DISCONTINUED | OUTPATIENT
Start: 2023-09-20 | End: 2023-09-20

## 2023-09-20 RX ORDER — FENTANYL CITRATE 50 UG/ML
INJECTION, SOLUTION INTRAMUSCULAR; INTRAVENOUS AS NEEDED
Status: DISCONTINUED | OUTPATIENT
Start: 2023-09-20 | End: 2023-09-20

## 2023-09-20 RX ORDER — ACETAMINOPHEN 325 MG/1
975 TABLET ORAL EVERY 6 HOURS PRN
Status: CANCELLED | OUTPATIENT
Start: 2023-09-20

## 2023-09-20 RX ORDER — IBUPROFEN 600 MG/1
600 TABLET ORAL EVERY 6 HOURS PRN
Status: CANCELLED | OUTPATIENT
Start: 2023-09-20

## 2023-09-20 RX ORDER — FENTANYL CITRATE/PF 50 MCG/ML
25 SYRINGE (ML) INJECTION
Status: DISCONTINUED | OUTPATIENT
Start: 2023-09-20 | End: 2023-09-20 | Stop reason: HOSPADM

## 2023-09-20 RX ORDER — SODIUM CHLORIDE, SODIUM LACTATE, POTASSIUM CHLORIDE, CALCIUM CHLORIDE 600; 310; 30; 20 MG/100ML; MG/100ML; MG/100ML; MG/100ML
125 INJECTION, SOLUTION INTRAVENOUS CONTINUOUS
Status: DISCONTINUED | OUTPATIENT
Start: 2023-09-20 | End: 2023-09-20 | Stop reason: HOSPADM

## 2023-09-20 RX ORDER — MIDAZOLAM HYDROCHLORIDE 2 MG/2ML
INJECTION, SOLUTION INTRAMUSCULAR; INTRAVENOUS AS NEEDED
Status: DISCONTINUED | OUTPATIENT
Start: 2023-09-20 | End: 2023-09-20

## 2023-09-20 RX ORDER — DIPHENHYDRAMINE HYDROCHLORIDE 50 MG/ML
12.5 INJECTION INTRAMUSCULAR; INTRAVENOUS ONCE AS NEEDED
Status: DISCONTINUED | OUTPATIENT
Start: 2023-09-20 | End: 2023-09-20 | Stop reason: HOSPADM

## 2023-09-20 RX ORDER — ONDANSETRON 2 MG/ML
4 INJECTION INTRAMUSCULAR; INTRAVENOUS EVERY 6 HOURS PRN
Status: CANCELLED | OUTPATIENT
Start: 2023-09-20

## 2023-09-20 RX ORDER — METOCLOPRAMIDE HYDROCHLORIDE 5 MG/ML
10 INJECTION INTRAMUSCULAR; INTRAVENOUS ONCE AS NEEDED
Status: DISCONTINUED | OUTPATIENT
Start: 2023-09-20 | End: 2023-09-20 | Stop reason: HOSPADM

## 2023-09-20 RX ORDER — DEXAMETHASONE SODIUM PHOSPHATE 10 MG/ML
INJECTION, SOLUTION INTRAMUSCULAR; INTRAVENOUS AS NEEDED
Status: DISCONTINUED | OUTPATIENT
Start: 2023-09-20 | End: 2023-09-20

## 2023-09-20 RX ORDER — SODIUM CHLORIDE, SODIUM LACTATE, POTASSIUM CHLORIDE, CALCIUM CHLORIDE 600; 310; 30; 20 MG/100ML; MG/100ML; MG/100ML; MG/100ML
125 INJECTION, SOLUTION INTRAVENOUS CONTINUOUS
Status: CANCELLED | OUTPATIENT
Start: 2023-09-20

## 2023-09-20 RX ORDER — ONDANSETRON 2 MG/ML
INJECTION INTRAMUSCULAR; INTRAVENOUS AS NEEDED
Status: DISCONTINUED | OUTPATIENT
Start: 2023-09-20 | End: 2023-09-20

## 2023-09-20 RX ORDER — PROPOFOL 10 MG/ML
INJECTION, EMULSION INTRAVENOUS AS NEEDED
Status: DISCONTINUED | OUTPATIENT
Start: 2023-09-20 | End: 2023-09-20

## 2023-09-20 RX ORDER — ALBUTEROL SULFATE 2.5 MG/3ML
2.5 SOLUTION RESPIRATORY (INHALATION) ONCE AS NEEDED
Status: DISCONTINUED | OUTPATIENT
Start: 2023-09-20 | End: 2023-09-20 | Stop reason: HOSPADM

## 2023-09-20 RX ORDER — PROPOFOL 10 MG/ML
INJECTION, EMULSION INTRAVENOUS CONTINUOUS PRN
Status: DISCONTINUED | OUTPATIENT
Start: 2023-09-20 | End: 2023-09-20

## 2023-09-20 RX ORDER — METHYLERGONOVINE MALEATE 0.2 MG/ML
INJECTION INTRAVENOUS AS NEEDED
Status: DISCONTINUED | OUTPATIENT
Start: 2023-09-20 | End: 2023-09-20

## 2023-09-20 RX ADMIN — SODIUM CHLORIDE, SODIUM LACTATE, POTASSIUM CHLORIDE, AND CALCIUM CHLORIDE: .6; .31; .03; .02 INJECTION, SOLUTION INTRAVENOUS at 07:24

## 2023-09-20 RX ADMIN — MIDAZOLAM 1 MG: 1 INJECTION INTRAMUSCULAR; INTRAVENOUS at 07:25

## 2023-09-20 RX ADMIN — FENTANYL CITRATE 25 MCG: 50 INJECTION INTRAMUSCULAR; INTRAVENOUS at 07:42

## 2023-09-20 RX ADMIN — LIDOCAINE HYDROCHLORIDE 50 MG: 10 INJECTION, SOLUTION EPIDURAL; INFILTRATION; INTRACAUDAL; PERINEURAL at 07:30

## 2023-09-20 RX ADMIN — DEXAMETHASONE SODIUM PHOSPHATE 10 MG: 10 INJECTION, SOLUTION INTRAMUSCULAR; INTRAVENOUS at 07:32

## 2023-09-20 RX ADMIN — MIDAZOLAM 1 MG: 1 INJECTION INTRAMUSCULAR; INTRAVENOUS at 07:28

## 2023-09-20 RX ADMIN — ONDANSETRON 4 MG: 2 INJECTION INTRAMUSCULAR; INTRAVENOUS at 07:33

## 2023-09-20 RX ADMIN — PROPOFOL 350 MG: 10 INJECTION, EMULSION INTRAVENOUS at 07:30

## 2023-09-20 RX ADMIN — PROPOFOL 200 MCG/KG/MIN: 10 INJECTION, EMULSION INTRAVENOUS at 07:31

## 2023-09-20 RX ADMIN — FENTANYL CITRATE 25 MCG: 50 INJECTION INTRAMUSCULAR; INTRAVENOUS at 07:41

## 2023-09-20 RX ADMIN — FENTANYL CITRATE 50 MCG: 50 INJECTION INTRAMUSCULAR; INTRAVENOUS at 07:40

## 2023-09-20 RX ADMIN — METHYLERGONOVINE MALEATE 0.2 MG: 0.2 INJECTION INTRAVENOUS at 07:50

## 2023-09-20 RX ADMIN — DOXYCYCLINE 200 MG: 100 INJECTION, POWDER, LYOPHILIZED, FOR SOLUTION INTRAVENOUS at 07:33

## 2023-09-20 NOTE — OP NOTE
OPERATIVE REPORT  PATIENT NAME: Claire Zambrano    :  1995  MRN: [id number]  Pt Location: [facility] OR ROOM 03    SURGERY DATE: 2023    Surgeon(s) and Role:     * Alejandro Barnett MD - Primary    Preop Diagnosis:  Missed ab O02.1    Post-Op Diagnosis Codes:     * Missed ab [O02.1]    Procedure(s):  DILATATION AND EVACUATION (D&E) ( 11 weeks)    Specimen(s):  ID Type Source Tests Collected by Time Destination   1 :  Tissue Products of Conception TISSUE EXAM Alejandro Barnett MD 2023        Estimated Blood Loss:   100 mL    Drains:  * No LDAs found *    Anesthesia Type:   General    Operative Indications:  Missed ab O02.1      Operative Findings:  Anteverted uterus sounding to 13 cm, appropriate products of conception    Complications:   None    Procedure and Technique:  Claire Zambrano was identified as a self in the preop holding area she was taken back to operating room #3 and placed under general anesthesia. She was placed in dorsolithotomy position and prepped and draped in standard fashion for vaginal case. All pressure points were padded and Mariza hugger was placed to maintain core body temperature. After appropriate timeout case was begun. Exam under anesthesia revealed an anteverted top normal size uterus with no palpable rectal masses cervix was slightly dilated no active bleeding. Weighted speculum was placed in the posterior vaginal fornix, and a [place] was placed anteriorly. An [place] was placed at 12 o'clock position to grasp the cervix and the uterus was gently sounded to 13 cm. The cervix was gradually dilated to size 27 Etienne. The size 8 curved suction cannula was gently placed into the uterus and several passes were performed removing products of conception. Gentle curetting was then performed with a curette followed by a few more passes with the suction curette. There did not appear to be any further tissue and good hemostasis see.   Patient was thoroughly cleansed and allowed awaken and taken to recovery room in stable condition. Methergine was administered towards the end of the case to ensure uterine tone and hemostasis. I was present for the entire procedure.     Patient Disposition:  PACU  and extubated and stable        SIGNATURE: Evelyn Garcia MD  DATE: September 20, 2023  TIME: 8:08 AM

## 2023-09-20 NOTE — ANESTHESIA POSTPROCEDURE EVALUATION
Post-Op Assessment Note    CV Status:  Stable  Pain Score: 0    Pain management: adequate     Mental Status:  Alert and awake   Hydration Status:  Euvolemic and stable   PONV Controlled:  Controlled   Airway Patency:  Patent and adequate      Post Op Vitals Reviewed: Yes      Staff: CRNA         No notable events documented.     /57 (09/20/23 0802)    Temp 98 °F (36.7 °C) (09/20/23 0802)    Pulse 80 (09/20/23 0802)   Resp 14 (09/20/23 0802)    SpO2 98 % (09/20/23 0802)

## 2023-09-20 NOTE — INTERVAL H&P NOTE
H&P reviewed. After examining the patient I find no changes in the patients condition since the H&P had been written.     Vitals:    09/20/23 0657   BP: 123/68   Pulse: 96   Resp: 18   Temp: 97.8 °F (36.6 °C)   SpO2: 100%

## 2023-09-24 ENCOUNTER — NURSE TRIAGE (OUTPATIENT)
Dept: OTHER | Facility: OTHER | Age: 28
End: 2023-09-24

## 2023-09-24 NOTE — TELEPHONE ENCOUNTER
Regarding: Post DNC pain  ----- Message from Gaviota Shultz sent at 9/24/2023  5:36 PM EDT -----  " I just had a DNC done and still having severe cramping. Tylenol is not controlling my pain.  Is there anything else that I can take."

## 2023-09-24 NOTE — TELEPHONE ENCOUNTER
Reason for Disposition  • [1] Caller has URGENT question AND [2] triager unable to answer question    Answer Assessment - Initial Assessment Questions  1. SYMPTOM: "What's the main symptom you're concerned about?" (e.g., pain, fever, vomiting)      Abdominal pain/cramping    2. ONSET: "When did symptoms start?"      Since the procedure last Wednesday     3. SURGERY: "What surgery was performed?"      DILATATION AND EVACUATION (D&E) ( 11 weeks) (Uterus)    4. DATE of SURGERY: "When was surgery performed?"       9/20/2023    5. ANESTHESIA: " What type of anesthesia did you have?" (e.g., general, spinal, epidural, local)      General    6. PAIN: "Is there any pain?" If Yes, ask: "How bad is it?"  (Scale 1-10; or mild, moderate, severe)      Right now, 6/10 - took Tylenol an hour ago    7. FEVER: "Do you have a fever?" If Yes, ask: "What is your temperature, how was it measured, and when did it start?"      Denies fever    8. VOMITING: "Is there any vomiting?" If yes, ask: "How many times?"      No nausea or vomiting    9. BLEEDING: "Is there any bleeding?" If Yes, ask: "How much?" and "Where?"      Denies    10. OTHER SYMPTOMS: "Do you have any other symptoms?" (e.g., drainage from wound, painful urination, constipation)        Reports no other symptoms at this time    Protocols used: POST-OP SYMPTOMS AND QUESTIONS-ADULT-    Reached out to on call provider- per Dr. Darryl Kumar, patient can try taking Motrin 600mg every 6hrs as needed for pain--it’s much better for cramping than Tylenol. Can also apply heat (heating pad) to lower abdomen or back depending on where pain is. If severe pain without improvement or fever (100.4 or higher) or if bleeding is very heavy, should present to ED for evaluation. Recommendations and care advice given. Patient verbalized understanding. Please follow up with patient next week.

## 2023-09-27 ENCOUNTER — TELEPHONE (OUTPATIENT)
Dept: OBGYN CLINIC | Facility: CLINIC | Age: 28
End: 2023-09-27

## 2023-09-27 PROCEDURE — 88305 TISSUE EXAM BY PATHOLOGIST: CPT | Performed by: PATHOLOGY

## 2023-10-04 ENCOUNTER — OFFICE VISIT (OUTPATIENT)
Dept: OBGYN CLINIC | Facility: CLINIC | Age: 28
End: 2023-10-04

## 2023-10-04 VITALS
WEIGHT: 220 LBS | DIASTOLIC BLOOD PRESSURE: 72 MMHG | BODY MASS INDEX: 36.65 KG/M2 | HEIGHT: 65 IN | SYSTOLIC BLOOD PRESSURE: 112 MMHG

## 2023-10-04 DIAGNOSIS — N96 RECURRENT PREGNANCY LOSS: Primary | ICD-10-CM

## 2023-10-04 PROCEDURE — 99024 POSTOP FOLLOW-UP VISIT: CPT | Performed by: STUDENT IN AN ORGANIZED HEALTH CARE EDUCATION/TRAINING PROGRAM

## 2023-10-04 NOTE — PROGRESS NOTES
[facility]  Post-Op Visit  Yrn Pulido MD  10/04/23      Assessment   Patient is a 34 y.o. y.o. female who is 2 weeks s/p D&E at 5 weeks gestation who is doing well post-operatively- now meeting diagnosis of recurrent pregnancy loss given 2 prior consecutive losses and another loss will plan to initiate work up. Operative findings again reviewed. Pathology report discussed- chorionic villi and decidua     Plan    1. Pain management PRN: None needed at this time but we will plan for Motrin and Tylenol as needed  2. Reviewed given this is her third loss would warrant further work-up-no genetic testing was performed and products of conception. We will plan for thyroid testing, hemoglobin A1c and antiphospholipid screening. Additionally, a referral for genetic counseling was placed to review with family history and offered genetic screening as needed. 3. Activity restrictions: Continue pelvic rest at this time for an additional 2 weeks. 4.  Contraception: She declines at this time and would like to consider options-she previously did well on the Nexplanon and will let us know if she would like to have that placed again. She understands pregnancy is a possibility with the next cycle and that if she does not desire close interval pregnancy to use some form of contraception. 5. Follow up: Annual exam or sooner if needed    Subjective    Kacy Torres is a 34 y.o. y.o. female who presents to the office 2 weeks status post D&E for missed . She is eating a regular diet without difficulty. Urination and bowel movements are normal. The patient is not having any pain. . She is no longer having any bleeding and denies any abnormal discharge or pelvic pain.     Latex and Adhesive [medical tape]    Current Outpatient Medications:   •  acetaminophen (TYLENOL) 325 mg tablet, Take 3 tablets (975 mg total) by mouth every 8 (eight) hours as needed for mild pain, headaches or fever, Disp: , Rfl: 0  •  Prenatal Vit-Fe Fumarate-FA (PRENATAL VITAMIN PO), Take by mouth, Disp: , Rfl:   •  norethindrone (MICRONOR) 0.35 MG tablet, Take 1 tablet (0.35 mg total) by mouth daily, Disp: 84 tablet, Rfl: 1      Review of Systems  Denies Fevers/chills/N/V/Constipation/Vaginal bleeding/excessive pain/dysuria/frequency of urination. Also as noted in HPI.     PMH/ PSH/ SH/ PFH/ allergies and medications were reviewed and updated during this visit. Objective   /72 (BP Location: Left arm, Patient Position: Sitting, Cuff Size: Standard)   Ht 5' 5" (1.651 m)   Wt 99.8 kg (220 lb)   LMP 07/02/2023   Breastfeeding Unknown   BMI 36.61 kg/m²   Physical Exam  Vitals reviewed. Constitutional:       Appearance: Normal appearance. She is not ill-appearing or diaphoretic. Cardiovascular:      Rate and Rhythm: Normal rate. Pulmonary:      Effort: Pulmonary effort is normal. No respiratory distress. Abdominal:      Palpations: Abdomen is soft. Tenderness: There is no abdominal tenderness. There is no guarding or rebound. Genitourinary:     Comments: Thank you on speculum exam cervix appears 1 cm dilated with no lesions, minimal mucoid brown discharge noted in the vaginal vault. Musculoskeletal:      Right lower leg: No edema. Left lower leg: No edema. Skin:     General: Skin is warm and dry. Neurological:      Mental Status: She is alert and oriented to person, place, and time.    Psychiatric:         Mood and Affect: Mood normal.         Behavior: Behavior normal.

## 2023-10-27 ENCOUNTER — TELEPHONE (OUTPATIENT)
Facility: HOSPITAL | Age: 28
End: 2023-10-27

## 2023-10-27 NOTE — TELEPHONE ENCOUNTER
Called patient to schedule MFM appointment, based on referral issued to Maternal Fetal Medicine by [facility] office. Left voicemail requesting patient to call back and schedule appointment, with office number for return call [phone]. PT HAD 2 APPT'S. NO-SHOWED BOTH.  CALLED 1X TO DISCUSS IF SHE WANTS TO R/S

## 2023-11-17 ENCOUNTER — APPOINTMENT (OUTPATIENT)
Dept: LAB | Facility: CLINIC | Age: 28
End: 2023-11-17
Payer: COMMERCIAL

## 2023-11-17 DIAGNOSIS — N96 RECURRENT PREGNANCY LOSS: ICD-10-CM

## 2023-11-17 LAB
EST. AVERAGE GLUCOSE BLD GHB EST-MCNC: 108 MG/DL
HBA1C MFR BLD: 5.4 %
T4 FREE SERPL-MCNC: 0.68 NG/DL (ref 0.61–1.12)
TSH SERPL DL<=0.05 MIU/L-ACNC: 3.24 UIU/ML (ref 0.45–4.5)

## 2023-11-17 PROCEDURE — 86146 BETA-2 GLYCOPROTEIN ANTIBODY: CPT

## 2023-11-17 PROCEDURE — 83036 HEMOGLOBIN GLYCOSYLATED A1C: CPT

## 2023-11-17 PROCEDURE — 86147 CARDIOLIPIN ANTIBODY EA IG: CPT

## 2023-11-17 PROCEDURE — 85705 THROMBOPLASTIN INHIBITION: CPT

## 2023-11-17 PROCEDURE — 36415 COLL VENOUS BLD VENIPUNCTURE: CPT

## 2023-11-17 PROCEDURE — 85670 THROMBIN TIME PLASMA: CPT

## 2023-11-17 PROCEDURE — 85732 THROMBOPLASTIN TIME PARTIAL: CPT

## 2023-11-17 PROCEDURE — 84439 ASSAY OF FREE THYROXINE: CPT

## 2023-11-17 PROCEDURE — 85613 RUSSELL VIPER VENOM DILUTED: CPT

## 2023-11-17 PROCEDURE — 84443 ASSAY THYROID STIM HORMONE: CPT

## 2023-11-20 LAB
APTT SCREEN TO CONFIRM RATIO: 1.04 RATIO (ref 0–1.34)
CONFIRM APTT/NORMAL: 31.2 SEC (ref 0–47.6)
LA PPP-IMP: NORMAL
SCREEN APTT: 36.4 SEC (ref 0–43.5)
SCREEN DRVVT: 31.5 SEC (ref 0–47)
THROMBIN TIME: 15.1 SEC (ref 0–23)

## 2023-11-21 LAB
B2 GLYCOPROT1 IGA SERPL IA-ACNC: 1
B2 GLYCOPROT1 IGG SERPL IA-ACNC: 1.3
B2 GLYCOPROT1 IGM SERPL IA-ACNC: <2.4
CARDIOLIPIN IGA SER IA-ACNC: 1.5
CARDIOLIPIN IGG SER IA-ACNC: 1.5
CARDIOLIPIN IGM SER IA-ACNC: 1.9

## 2023-11-22 ENCOUNTER — TELEPHONE (OUTPATIENT)
Facility: HOSPITAL | Age: 28
End: 2023-11-22

## 2023-11-22 NOTE — TELEPHONE ENCOUNTER
Called patient to schedule MFM appointment, based on referral issued to Maternal Fetal Medicine by [facility] office. Unable to reach patient by phone. Sent message in [facility] requesting patient to call back and schedule appointment, with office number for return call [phone]. PT has had 3 appt's. No-showed all appt's and unable to contact to r/s. Referral changed appropriately.

## 2024-07-09 ENCOUNTER — TELEPHONE (OUTPATIENT)
Age: 29
End: 2024-07-09

## 2024-07-09 DIAGNOSIS — N91.2 AMENORRHEA: Primary | ICD-10-CM

## 2024-07-09 NOTE — TELEPHONE ENCOUNTER
Patient called and scheduled dating and viability ultrasound for 8/8/24. Patient's lmp is 6/13/24. Patient had wanted to see if there was anything that she needed to do before this appointment. She asked if labs should be done, if so she uses the [facility] lab. Patient had a miscarriage in October and just wanted some peace of mind. Patient can be reached at [phone] or via my chart.

## 2024-07-10 ENCOUNTER — APPOINTMENT (OUTPATIENT)
Dept: LAB | Facility: CLINIC | Age: 29
End: 2024-07-10
Payer: COMMERCIAL

## 2024-07-10 DIAGNOSIS — N91.2 AMENORRHEA: ICD-10-CM

## 2024-07-10 LAB
B-HCG SERPL-ACNC: 181.6 MIU/ML (ref 0–5)
PROGEST SERPL-MCNC: 24.01 NG/ML

## 2024-07-10 PROCEDURE — 84702 CHORIONIC GONADOTROPIN TEST: CPT

## 2024-07-10 PROCEDURE — 84144 ASSAY OF PROGESTERONE: CPT

## 2024-07-10 PROCEDURE — 36415 COLL VENOUS BLD VENIPUNCTURE: CPT

## 2024-07-12 ENCOUNTER — APPOINTMENT (OUTPATIENT)
Dept: LAB | Facility: CLINIC | Age: 29
End: 2024-07-12
Payer: COMMERCIAL

## 2024-07-12 DIAGNOSIS — N91.2 AMENORRHEA: ICD-10-CM

## 2024-07-12 LAB — B-HCG SERPL-ACNC: 459 MIU/ML (ref 0–5)

## 2024-07-12 PROCEDURE — 84702 CHORIONIC GONADOTROPIN TEST: CPT

## 2024-07-12 PROCEDURE — 36415 COLL VENOUS BLD VENIPUNCTURE: CPT

## 2024-08-05 NOTE — PROGRESS NOTES
"S: 28 y.o.  who presents for viability scan with LMP of 24. Periods are regular. She is 8 weeks and 0 days by her LMP. She reports headaches (history of chronic migraines). She denies cramping or vaginal bleeding. This was surprise, but welcomed pregnancy.     Past Medical History:   Diagnosis Date    Encounter for induction of labor 2022    Migraine     Miscarriage     PONV (postoperative nausea and vomiting)     Seizures (HCC)     last one 4 years ago.       OB History    Para Term  AB Living   5 1 1   2 1   SAB IAB Ectopic Multiple Live Births   2     0 1      # Outcome Date GA Lbr Sadiq/2nd Weight Sex Type Anes PTL Lv   5 Current            4 Term 22 37w3d  2830 g (6 lb 3.8 oz) F CS-LTranv   LAVONNE   3 2019           2 2017           1                  O:  Vitals:    24 1502   BP: 130/76   BP Location: Left arm   Patient Position: Sitting   Cuff Size: Standard   Weight: 98.9 kg (218 lb)   Height: 5' 5\" (1.651 m)     TVUS indicates viable, martinez IUP measuring 18.3mm, correlating with LMP. FREDY based off LMP for 25. FHR: 165      A/P:  1. Viable pregnancy on TVUS  2. MFM referral placed.  3. RTC in 2 week for nurse intake visit    Problem List Items Addressed This Visit    None  Visit Diagnoses       Amenorrhea    -  Primary    Relevant Orders    [facility] US OB < 14 weeks single or first gestation level 1 (Completed)    8 weeks gestation of pregnancy        Relevant Orders    Ambulatory Referral to Maternal Fetal Medicine            "

## 2024-08-08 ENCOUNTER — ULTRASOUND (OUTPATIENT)
Dept: OBGYN CLINIC | Facility: CLINIC | Age: 29
End: 2024-08-08
Payer: COMMERCIAL

## 2024-08-08 VITALS
DIASTOLIC BLOOD PRESSURE: 76 MMHG | SYSTOLIC BLOOD PRESSURE: 130 MMHG | WEIGHT: 218 LBS | BODY MASS INDEX: 36.32 KG/M2 | HEIGHT: 65 IN

## 2024-08-08 DIAGNOSIS — N91.2 AMENORRHEA: Primary | ICD-10-CM

## 2024-08-08 DIAGNOSIS — Z3A.08 8 WEEKS GESTATION OF PREGNANCY: ICD-10-CM

## 2024-08-08 PROCEDURE — 76817 TRANSVAGINAL US OBSTETRIC: CPT

## 2024-08-08 PROCEDURE — 99214 OFFICE O/P EST MOD 30 MIN: CPT

## 2024-08-12 ENCOUNTER — TELEPHONE (OUTPATIENT)
Age: 29
End: 2024-08-12

## 2024-08-21 ENCOUNTER — INITIAL PRENATAL (OUTPATIENT)
Dept: OBGYN CLINIC | Facility: CLINIC | Age: 29
End: 2024-08-21
Payer: COMMERCIAL

## 2024-08-21 VITALS — BODY MASS INDEX: 36.28 KG/M2 | HEIGHT: 65 IN

## 2024-08-21 DIAGNOSIS — R63.8 INCREASED BMI: ICD-10-CM

## 2024-08-21 DIAGNOSIS — Z87.59 HISTORY OF GESTATIONAL HYPERTENSION: ICD-10-CM

## 2024-08-21 DIAGNOSIS — Z34.81 PRENATAL CARE, SUBSEQUENT PREGNANCY, FIRST TRIMESTER: Primary | ICD-10-CM

## 2024-08-21 DIAGNOSIS — D56.9 THALASSEMIA, UNSPECIFIED TYPE: ICD-10-CM

## 2024-08-21 DIAGNOSIS — Z36.89 ENCOUNTER FOR OTHER SPECIFIED ANTENATAL SCREENING: ICD-10-CM

## 2024-08-21 PROCEDURE — 99211 OFF/OP EST MAY X REQ PHY/QHP: CPT | Performed by: NURSE PRACTITIONER

## 2024-08-21 PROCEDURE — 99211 OFF/OP EST MAY X REQ PHY/QHP: CPT

## 2024-08-21 NOTE — PATIENT INSTRUCTIONS
Congratulations!! Please review our Pregnancy Essential Guide and [facility] L&D Virtual tour from our networks website.     [facility] Pregnancy Essentials Guide  [facility] ([email or web address])     [facility] - Virtual Tour ([email or web address])        Moy Dilcia,     It was so nice getting to know you over the phone today. Remember if you have an urgent or time sensitive concern, please call the practice phone number so a clinical triage team member can review your symptoms and get in touch with our on call provider if necessary. If you have general questions or need help navigating our services please REPLY to this message so it comes directly to me and I will respond between other patients. If I am out of the office for any reason, another nurse navigator may reach out to help you. Our Pregnancy Essential Guide is a great online resource--please use the link below.     [facility] Pregnancy Essentials Guide  [facility] ([email or web address])     Again, Congratulations and Thank You for choosing [facility]. I look forward to helping you through this journey. Reach out-   Mignon CLAY RN

## 2024-08-21 NOTE — PROGRESS NOTES
OB INTAKE INTERVIEW  Patient is 28 y.o. who presents for OB intake at 9 wks 6 days  She is accompanied by herself during this encounter - THIS IS A TELEPHONE ENCOUNTER  The father of her baby (Dwayne Rose ) is involved in the pregnancy and is 26 years old.      Last Menstrual Period: 2024  Ultrasound: Measured 8 weeks 2 days on 2024  Estimated Date of Delivery: 3/20/2025 confirmed by US    Signs/Symptoms of Pregnancy  Current pregnancy symptoms: nausea  Constipation no  Headaches YES - Tylenol effective  Cramping/spotting no  PICA cravings no    Diabetes-  There is no height or weight on file to calculate BMI.  If patient has 1 or more, please order early 1 hour GTT  History of GDM no  BMI >35 YES  History of PCOS or current metformin use no  History of LGA/macrosomic infant (4000g/9lbs) no    If patient has 2 or more, please order early 1 hour GTT  BMI>30 no  AMA no  First degree relative with type 2 diabetes no Her mom - Type 1 diabetes diagnostic age 40  History of chronic HTN, hyperlipidemia, elevated A1C no  High risk race (, , ,  or ) no    Hypertension- if you answer yes to any of the following, please order baseline preeclampsia labs (cbc, comprehensive metabolic panel, urine protein creatinine ratio, uric acid)  History of of chronic HTN no  History of gestational HTN YES  History of preeclampsia, eclampsia, or HELLP syndrome no  History of diabetes no  History of lupus, autoimmune disease, kidney disease no    Thyroid- if yes order TSH with reflex T4  History of thyroid disease no    Bleeding Disorder or Hx of DVT-patient or first degree relative with history of. Order the following if not done previously.   (Factor V, antithrombin III, prothrombin gene mutation, protein C and S Ag, lupus anticoagulant, anticardiolipin, beta-2 glycoprotein)   no    OB/GYN-  History of abnormal pap smear no       Date of last pap smear 2022  (wnl)  History of HPV no  History of Herpes/HSV no  History of other STI (gonorrhea, chlamydia, trich) no  History of prior  no  History of prior  YES 2022 - failed induction - dilated to 5 cm  History of  delivery prior to 36 weeks 6 days no  History of blood transfusion no  Ok for blood transfusion YES    Substance screening-   History of tobacco use no  Currently using tobacco no  Substance Use Screen Level (N/A, LOW, HIGH) N/A    MRSA Screening-   Does the pt have a hx of MRSA? no    Immunizations:  Influenza vaccine given this season  Discussed Tdap vaccine - YES  Discussed COVID Vaccine     Genetic/MFM-  Do you or your partner have a history of any of the following in yourselves or first degree relatives?  Cystic fibrosis no  Spinal muscular atrophy no  Hemoglobinopathy/Sickle Cell/Thalassemia no  Fragile X Intellectual Disability no    If yes, discuss Carrier Screening and recommend consultation with MFM/Genetic Counseling and place specific [facility] Referral for.    If no, discuss Carrier Screening being completed once in a lifetime as a standard of care lab test. Place orders for Cystic Fibrosis Gene Test (JOX952) and Spinal Muscular Atrophy DNA ([id number])  - patient will verify if previously done - will request lab order if needed      Appointment for Nuchal Translucency Ultrasound at [facility] scheduled for 2024 - discussed NIPT/MASFP      Interview education  [facility] Pregnancy Essentials Book reviewed, discussed and attached to their AVS YES    Nurse/Family Partnership- patient may qualify; referral placed NO    Prenatal lab work scripts YES ([facility])  Extra labs ordered:  1 hr glucose, hgb fractionation cascade, CMP, uric acid, urine protein/creatinine ratio    Aspirin/Preeclampsia Screen    Risk Level Risk Factors Recommendation   LOW Prior Uncomplicated full-term delivery - failed induction - C/S 37 wk 3 days (gest HTN) No Aspirin recommendation        MODERATE Nulliparity no  Recommend low-dose aspirin if     BMI>30 YES 2 or more moderate risk factors    Family History Preeclampsia (mother/sister) no     35yr old or greater no     Black Race, Concern for SDOH/Low Socioeconomic no     IVF Pregnancy  no     Personal History Risks (low birth weight, prior adverse preg outcome, >10yr preg interval) no         HIGH History of Preeclampsia no Recommend low-dose aspirin if     Multifetal gestation no 1 or more high risk factors    Chronic HTN no     Type 1 or 2 Diabetes no     Renal Disease no     Autoimmune Disease  no      Contraindications to ASA therapy:  NSAID/ ASA allergy: no  Nasal polyps: no  Asthma with history of ASA induced bronchospasm: no  Relative contraindications:  History of GI bleed: no  Active peptic ulcer disease: no  Severe hepatic dysfunction: no    Patient should be recommended to take ASA 162mg during this pregnancy from 12-36wks to lower her risk of preeclampsia: will follow up with OB provider next appt      The patient has a history now or in prior pregnancy notable for:  - hx SAB x 3 (last SAB 2023 - had D&E)  - hx gestational HTN - induction @ 37 wk 3 days (failed) - no meds -  - interested in   - hx migraines  - has LATEX & adhesive tape allergy          Details that I feel the provider should be aware of:   - 1st pregnancy with FOB (he has 2 yr old daughter)  - recommended q 6 month dental cleanings - last cleaning approx 1 yr ago  - no dietary restrictions - reviewed recommendations in pregnancy        PN1 visit scheduled. The patient was oriented to our practice, the navigator role, reviewed delivering physicians and [facility] for Delivery. All questions were answered.    Interviewed by: CHRISTOPHER Rojas RN

## 2024-08-23 ENCOUNTER — APPOINTMENT (OUTPATIENT)
Dept: LAB | Facility: CLINIC | Age: 29
End: 2024-08-23
Payer: COMMERCIAL

## 2024-08-23 DIAGNOSIS — Z34.81 PRENATAL CARE, SUBSEQUENT PREGNANCY, FIRST TRIMESTER: ICD-10-CM

## 2024-08-23 DIAGNOSIS — Z87.59 HISTORY OF GESTATIONAL HYPERTENSION: ICD-10-CM

## 2024-08-23 DIAGNOSIS — Z36.89 ENCOUNTER FOR OTHER SPECIFIED ANTENATAL SCREENING: ICD-10-CM

## 2024-08-23 DIAGNOSIS — D56.9 THALASSEMIA, UNSPECIFIED TYPE: ICD-10-CM

## 2024-08-23 LAB
ABO GROUP BLD: NORMAL
ALBUMIN SERPL BCG-MCNC: 3.9 G/DL (ref 3.5–5)
ALP SERPL-CCNC: 54 U/L (ref 34–104)
ALT SERPL W P-5'-P-CCNC: 9 U/L (ref 7–52)
ANION GAP SERPL CALCULATED.3IONS-SCNC: 5 MMOL/L (ref 4–13)
AST SERPL W P-5'-P-CCNC: 12 U/L (ref 13–39)
BASOPHILS # BLD AUTO: 0.07 THOUSANDS/ÂΜL (ref 0–0.1)
BASOPHILS NFR BLD AUTO: 1 % (ref 0–1)
BILIRUB SERPL-MCNC: 0.38 MG/DL (ref 0.2–1)
BILIRUB UR QL STRIP: NEGATIVE
BLD GP AB SCN SERPL QL: NEGATIVE
BUN SERPL-MCNC: 6 MG/DL (ref 5–25)
CALCIUM SERPL-MCNC: 9.9 MG/DL (ref 8.4–10.2)
CHLORIDE SERPL-SCNC: 106 MMOL/L (ref 96–108)
CLARITY UR: CLEAR
CO2 SERPL-SCNC: 24 MMOL/L (ref 21–32)
COLOR UR: NORMAL
CREAT SERPL-MCNC: 0.64 MG/DL (ref 0.6–1.3)
CREAT UR-MCNC: 61.5 MG/DL
EOSINOPHIL # BLD AUTO: 0.12 THOUSAND/ÂΜL (ref 0–0.61)
EOSINOPHIL NFR BLD AUTO: 2 % (ref 0–6)
ERYTHROCYTE [DISTWIDTH] IN BLOOD BY AUTOMATED COUNT: 14.4 % (ref 11.6–15.1)
GFR SERPL CREATININE-BSD FRML MDRD: 123 ML/MIN/1.73SQ M
GLUCOSE P FAST SERPL-MCNC: 85 MG/DL (ref 65–99)
GLUCOSE UR STRIP-MCNC: NEGATIVE MG/DL
HBV SURFACE AG SER QL: NORMAL
HCT VFR BLD AUTO: 37 % (ref 34.8–46.1)
HCV AB SER QL: NORMAL
HGB BLD-MCNC: 12.2 G/DL (ref 11.5–15.4)
HGB UR QL STRIP.AUTO: NEGATIVE
HIV 1+2 AB+HIV1 P24 AG SERPL QL IA: NORMAL
HIV 2 AB SERPL QL IA: NORMAL
HIV1 AB SERPL QL IA: NORMAL
HIV1 P24 AG SERPL QL IA: NORMAL
IMM GRANULOCYTES # BLD AUTO: 0.02 THOUSAND/UL (ref 0–0.2)
IMM GRANULOCYTES NFR BLD AUTO: 0 % (ref 0–2)
KETONES UR STRIP-MCNC: NEGATIVE MG/DL
LEUKOCYTE ESTERASE UR QL STRIP: NEGATIVE
LYMPHOCYTES # BLD AUTO: 1.7 THOUSANDS/ÂΜL (ref 0.6–4.47)
LYMPHOCYTES NFR BLD AUTO: 21 % (ref 14–44)
MCH RBC QN AUTO: 26.8 PG (ref 26.8–34.3)
MCHC RBC AUTO-ENTMCNC: 33 G/DL (ref 31.4–37.4)
MCV RBC AUTO: 81 FL (ref 82–98)
MONOCYTES # BLD AUTO: 0.81 THOUSAND/ÂΜL (ref 0.17–1.22)
MONOCYTES NFR BLD AUTO: 10 % (ref 4–12)
NEUTROPHILS # BLD AUTO: 5.22 THOUSANDS/ÂΜL (ref 1.85–7.62)
NEUTS SEG NFR BLD AUTO: 66 % (ref 43–75)
NITRITE UR QL STRIP: NEGATIVE
NRBC BLD AUTO-RTO: 0 /100 WBCS
PH UR STRIP.AUTO: 7 [PH]
PLATELET # BLD AUTO: 294 THOUSANDS/UL (ref 149–390)
PMV BLD AUTO: 10.5 FL (ref 8.9–12.7)
POTASSIUM SERPL-SCNC: 3.7 MMOL/L (ref 3.5–5.3)
PROT SERPL-MCNC: 6.9 G/DL (ref 6.4–8.4)
PROT UR STRIP-MCNC: NEGATIVE MG/DL
PROT UR-MCNC: 4.6 MG/DL
PROT/CREAT UR: 0.1 MG/G{CREAT} (ref 0–0.1)
RBC # BLD AUTO: 4.55 MILLION/UL (ref 3.81–5.12)
RH BLD: POSITIVE
RUBV IGG SERPL IA-ACNC: 58.2 IU/ML
SODIUM SERPL-SCNC: 135 MMOL/L (ref 135–147)
SP GR UR STRIP.AUTO: 1.01 (ref 1–1.03)
SPECIMEN EXPIRATION DATE: NORMAL
TREPONEMA PALLIDUM IGG+IGM AB [PRESENCE] IN SERUM OR PLASMA BY IMMUNOASSAY: NORMAL
URATE SERPL-MCNC: 3.7 MG/DL (ref 2–7.5)
UROBILINOGEN UR STRIP-ACNC: <2 MG/DL
WBC # BLD AUTO: 7.94 THOUSAND/UL (ref 4.31–10.16)

## 2024-08-23 PROCEDURE — 86780 TREPONEMA PALLIDUM: CPT

## 2024-08-23 PROCEDURE — 82570 ASSAY OF URINE CREATININE: CPT

## 2024-08-23 PROCEDURE — 85025 COMPLETE CBC W/AUTO DIFF WBC: CPT

## 2024-08-23 PROCEDURE — 86901 BLOOD TYPING SEROLOGIC RH(D): CPT

## 2024-08-23 PROCEDURE — 84156 ASSAY OF PROTEIN URINE: CPT

## 2024-08-23 PROCEDURE — 36415 COLL VENOUS BLD VENIPUNCTURE: CPT

## 2024-08-23 PROCEDURE — 83020 HEMOGLOBIN ELECTROPHORESIS: CPT

## 2024-08-23 PROCEDURE — 86900 BLOOD TYPING SEROLOGIC ABO: CPT

## 2024-08-23 PROCEDURE — 81003 URINALYSIS AUTO W/O SCOPE: CPT

## 2024-08-23 PROCEDURE — 87086 URINE CULTURE/COLONY COUNT: CPT

## 2024-08-23 PROCEDURE — 86803 HEPATITIS C AB TEST: CPT

## 2024-08-23 PROCEDURE — 86762 RUBELLA ANTIBODY: CPT

## 2024-08-23 PROCEDURE — 87389 HIV-1 AG W/HIV-1&-2 AB AG IA: CPT

## 2024-08-23 PROCEDURE — 87340 HEPATITIS B SURFACE AG IA: CPT

## 2024-08-23 PROCEDURE — 80053 COMPREHEN METABOLIC PANEL: CPT

## 2024-08-23 PROCEDURE — 84550 ASSAY OF BLOOD/URIC ACID: CPT

## 2024-08-23 PROCEDURE — 86850 RBC ANTIBODY SCREEN: CPT

## 2024-08-24 LAB — BACTERIA UR CULT: NORMAL

## 2024-08-27 ENCOUNTER — APPOINTMENT (OUTPATIENT)
Dept: LAB | Facility: CLINIC | Age: 29
End: 2024-08-27
Payer: COMMERCIAL

## 2024-08-27 DIAGNOSIS — Z34.81 PRENATAL CARE, SUBSEQUENT PREGNANCY, FIRST TRIMESTER: ICD-10-CM

## 2024-08-27 DIAGNOSIS — R63.8 INCREASED BMI: ICD-10-CM

## 2024-08-27 PROCEDURE — 36415 COLL VENOUS BLD VENIPUNCTURE: CPT

## 2024-08-27 PROCEDURE — 82950 GLUCOSE TEST: CPT

## 2024-08-28 LAB — GLUCOSE 1H P 50 G GLC PO SERPL-MCNC: 110 MG/DL (ref 70–134)

## 2024-08-29 LAB
HGB A MFR BLD: 2.1 % (ref 1.8–3.2)
HGB A MFR BLD: 97.9 % (ref 96.4–98.8)
HGB F MFR BLD: 0 % (ref 0–2)
HGB FRACT BLD-IMP: NORMAL
HGB S MFR BLD: 0 %

## 2024-09-05 PROBLEM — O13.9 GESTATIONAL HYPERTENSION: Status: RESOLVED | Noted: 2022-08-19 | Resolved: 2024-09-05

## 2024-09-05 PROBLEM — O09.891 PRIOR PREGNANCY COMPLICATED BY PIH, ANTEPARTUM, FIRST TRIMESTER: Status: ACTIVE | Noted: 2024-09-05

## 2024-09-05 PROBLEM — Z98.891 STATUS POST PRIMARY LOW TRANSVERSE CESAREAN SECTION: Status: RESOLVED | Noted: 2022-08-19 | Resolved: 2024-09-05

## 2024-09-05 PROBLEM — O99.210 OBESITY IN PREGNANCY, ANTEPARTUM: Status: ACTIVE | Noted: 2024-09-05

## 2024-09-05 PROBLEM — O34.219 HISTORY OF CESAREAN DELIVERY, ANTEPARTUM: Status: ACTIVE | Noted: 2024-09-05

## 2024-09-05 NOTE — PATIENT INSTRUCTIONS
Low dose aspirin, when started early in pregnancy, can reduce your risk of (prevent) preeclampsia.  General dose recommendation is 81mg or 162mg daily.  Side effects are generally none to mild, however, if you experience what you think may be an allergic reaction after starting aspirin, immediately stop it and notify your doctor.  If you have asthma or acid reflux and notice an increase in symptom frequency or severity, consider stopping this medication, and notify your doctor.  If you have had bariatric surgery, you may need a different dose of medication with or without acid-reducing medication.  We advise routine discontinuation of this medication at 36 weeks.  For more resources, visit:  https://mothertobaby.org/fact-sheets/low-dose-aspirin/  https://www.preeclampsia.org/aspirin  https://www.highriskpregnancyinfo.org/preeclampsia   Thank you for choosing us for your  care today.  If you have any questions about your ultrasound or care, please do not hesitate to contact us or your primary obstetrician.        Some general instructions for your pregnancy are:    Exercise: Aim for 22 minutes per day (150 minutes per week) of regular exercise.  Walking is great!  Nutrition: Choose healthy sources of calcium, iron, and protein.  Learn about Preeclampsia: preeclampsia is a common, potentially serious high blood pressure complication in pregnancy.  A blood pressure of 140mmHg (systolic or top number) or 90mmHg (diastolic or bottom number) should be evaluated by your doctor.  Aspirin is sometimes prescribed in early pregnancy to prevent preeclampsia in women with risk factors - ask your obstetrician if you should be on this medication.  For more resources, visit:  https://www.highriskpregnancyinfo.org/preeclampsia  If you smoke, please try to quit completely but also try to reduce your smoking by as much as possible (as soon as possible).  Do not vape.  Please also avoid cannabis products.  Other warning signs  to watch out for in pregnancy or postpartum: chest pain, obstructed breathing or shortness of breath, seizures, thoughts of hurting yourself or your baby, bleeding, a painful or swollen leg, fever, or headache (see [facility]WHONN POST-BIRTH Warning Signs campaign).  If these happen call 911.  Itching is also not normal in pregnancy and if you experience this, especially over your hands and feet, potentially worse at night, notify your doctors.     The use of low dose aspirin in pregnancy (162mg) is recommended in women with an increased risk for preeclampsia, and was recommended today for you.  When started early in pregnancy (ideally 12-16 weeks but can be started as late as 28 weeks), low dose aspirin can reduce your risk of preeclampsia.  Low dose aspirin has been shown to be safe and without significant maternal or fetal risk.  Side effects are generally none to mild, however, if you experience what you think may be an allergic reaction after starting aspirin, immediately stop it and notify your doctor.  If you have asthma or acid reflux and notice an increase in symptom frequency or severity, consider stopping this medication, and notify your doctor.  If you have had bariatric surgery, you may need a different dose of medication with or without acid-reducing medication.  We advise routine discontinuation of this medication at 36 weeks.  For more resources, visit:  https://mothertobaby.org/fact-sheets/low-dose-aspirin/  https://www.preeclampsia.org/aspirin  https://www.highriskpregnancyinfo.org/preeclampsia

## 2024-09-06 ENCOUNTER — ROUTINE PRENATAL (OUTPATIENT)
Dept: PERINATAL CARE | Facility: OTHER | Age: 29
End: 2024-09-06
Payer: COMMERCIAL

## 2024-09-06 VITALS
HEIGHT: 65 IN | WEIGHT: 215.4 LBS | DIASTOLIC BLOOD PRESSURE: 72 MMHG | BODY MASS INDEX: 35.89 KG/M2 | SYSTOLIC BLOOD PRESSURE: 130 MMHG | HEART RATE: 90 BPM

## 2024-09-06 DIAGNOSIS — Z3A.12 12 WEEKS GESTATION OF PREGNANCY: ICD-10-CM

## 2024-09-06 DIAGNOSIS — Z36.0 ENCOUNTER FOR ANTENATAL SCREENING FOR CHROMOSOMAL ANOMALIES: ICD-10-CM

## 2024-09-06 DIAGNOSIS — Z3A.08 8 WEEKS GESTATION OF PREGNANCY: ICD-10-CM

## 2024-09-06 DIAGNOSIS — O99.210 OBESITY IN PREGNANCY, ANTEPARTUM: Primary | ICD-10-CM

## 2024-09-06 DIAGNOSIS — O34.219 HISTORY OF CESAREAN DELIVERY, ANTEPARTUM: ICD-10-CM

## 2024-09-06 DIAGNOSIS — O09.891 PRIOR PREGNANCY COMPLICATED BY PIH, ANTEPARTUM, FIRST TRIMESTER: ICD-10-CM

## 2024-09-06 DIAGNOSIS — Z36.82 ENCOUNTER FOR (NT) NUCHAL TRANSLUCENCY SCAN: ICD-10-CM

## 2024-09-06 PROCEDURE — 76801 OB US < 14 WKS SINGLE FETUS: CPT | Performed by: OBSTETRICS & GYNECOLOGY

## 2024-09-06 PROCEDURE — 36415 COLL VENOUS BLD VENIPUNCTURE: CPT | Performed by: OBSTETRICS & GYNECOLOGY

## 2024-09-06 PROCEDURE — 76813 OB US NUCHAL MEAS 1 GEST: CPT | Performed by: OBSTETRICS & GYNECOLOGY

## 2024-09-06 PROCEDURE — 99244 OFF/OP CNSLTJ NEW/EST MOD 40: CPT | Performed by: OBSTETRICS & GYNECOLOGY

## 2024-09-06 RX ORDER — ASPIRIN 81 MG/1
162 TABLET ORAL DAILY
Qty: 180 TABLET | Refills: 3 | Status: SHIPPED | OUTPATIENT
Start: 2024-09-06

## 2024-09-06 NOTE — LETTER
"   Date: 2024    JOSE D Odell  [address] [address]    Patient: Dilcia Kilgore   YOB: 1995   Date of Visit: 2024   Gestational age 12w1d   Nature of this communication: Routine       This patient was seen recently in our  office.  Please see ultrasound report under \"OB Procedures\" tab.  Please don't hesitate to contact our office with any concerns or questions.      Sincerely,      Marleni Hidalgo MD  Attending Physician, Maternal-Fetal Medicine  [facility]      "

## 2024-09-06 NOTE — PROGRESS NOTES
"[facility]: Ms. Kilgore was seen today for nuchal translucency ultrasound.  See ultrasound report under \"OB Procedures\" tab.      MDM:   I. Diagnoses/Problems addressed:  Stable chronic illness: BMI>30  II.  Data: I ordered the following tests: NIPS.  III.  Risk of morbidity: Moderate (Rx mgmt)    Please don't hesitate to contact our office with any concerns or questions.  -Marleni Hidalgo MD    "

## 2024-09-06 NOTE — PROGRESS NOTES
Patient chose to have [facility] [id number] Non-Invasive Prenatal Screen [id number] PLUS w/ SCA, WITH fetal sex.  Patient choose to be billed through insurance.     Patient given brochure and is aware [facility] will contact patient's insurance and coordinate coverage.  Provided [facility] contact information. General inquiries [phone], Cost estimates [phone] and [facility] Billing [phone]. Website [email or web address].     Blood collection tubes labeled with patient identifiers (name, medical record number, and date of birth).     Filled out [facility] order form. Patient chose to have blood drawn in our office at time of visit. NIPS was drawn from right arm with a butterfly needle by Nuzhat MASTERSON .      Maternal Fetal Medicine will have results in approximately 5-7 business days and will call patient or notify via [employer].  Patient aware viewing lab result online will reveal fetal sex if ordered.    Patient verbalized understanding of all instructions and no questions at this time.

## 2024-09-11 LAB
CFDNA.FET/CFDNA.TOTAL SFR FETUS: NORMAL %
CITATION REF LAB TEST: NORMAL
FET 13+18+21+X+Y ANEUP PLAS.CFDNA: NEGATIVE
FET CHR 21 TS PLAS.CFDNA QL: NEGATIVE
FET CHR 21 TS PLAS.CFDNA QL: NEGATIVE
FET MS X RISK WBC.DNA+CFDNA QL: NOT DETECTED
FET SEX PLAS.CFDNA DOSAGE CFDNA: NORMAL
FET TS 13 RISK PLAS.CFDNA QL: NEGATIVE
FET X + Y ANEUP RISK PLAS.CFDNA SEQ-IMP: NOT DETECTED
GA EST FROM CONCEPTION DATE: NORMAL D
GESTATIONAL AGE > 9:: YES
LAB DIRECTOR NAME PROVIDER: NORMAL
LAB DIRECTOR NAME PROVIDER: NORMAL
LABORATORY COMMENT REPORT: NORMAL
LIMITATIONS OF THE TEST: NORMAL
NEGATIVE PREDICTIVE VALUE: NORMAL
PERFORMANCE CHARACTERISTICS: NORMAL
POSITIVE PREDICTIVE VALUE: NORMAL
REF LAB TEST METHOD: NORMAL
SL AMB NOTE:: NORMAL
TEST PERFORMANCE INFO SPEC: NORMAL

## 2024-09-11 NOTE — PROGRESS NOTES
VISIT 29 yr old  at 13w0d (BLUE FOLDER - given AND signed APPT FORM): (+) n - comes and goes throughout the day; having difficulty sleeping; Reviewed unisom; (+) HA - tension like/pounding - comes and goes - tylenol as needed which typically helps but did have a headache this past week that lasted 3 days - currently resolved; (+) cramping - menstrual like; Denies v/vb/lof/edema/dv/smoking; urine neg/neg  Labs up to date; PNC - NIPT low risk; reviewed msAFP - will order next visit; started LDASA daily; level 2 scheduled  PNVs + DHA - tolerating daily  No FM yet    Physical exam done; Pap and C/G cultures collected  Encouraged hydration. Encouraged the flu vaccine and COVID booster in pregnancy; Encouraged infection prevention/handwashing.  RTO in 4 weeks for routine ob check or sooner if needed

## 2024-09-12 ENCOUNTER — INITIAL PRENATAL (OUTPATIENT)
Dept: OBGYN CLINIC | Facility: CLINIC | Age: 29
End: 2024-09-12
Payer: COMMERCIAL

## 2024-09-12 VITALS
BODY MASS INDEX: 35.99 KG/M2 | HEIGHT: 65 IN | WEIGHT: 216 LBS | SYSTOLIC BLOOD PRESSURE: 132 MMHG | DIASTOLIC BLOOD PRESSURE: 78 MMHG

## 2024-09-12 DIAGNOSIS — O99.210 OBESITY IN PREGNANCY, ANTEPARTUM: ICD-10-CM

## 2024-09-12 DIAGNOSIS — O34.219 HISTORY OF CESAREAN DELIVERY, ANTEPARTUM: ICD-10-CM

## 2024-09-12 DIAGNOSIS — O09.891 PRIOR PREGNANCY COMPLICATED BY PIH, ANTEPARTUM, FIRST TRIMESTER: ICD-10-CM

## 2024-09-12 DIAGNOSIS — Z3A.13 13 WEEKS GESTATION OF PREGNANCY: Primary | ICD-10-CM

## 2024-09-12 PROCEDURE — 99213 OFFICE O/P EST LOW 20 MIN: CPT | Performed by: PHYSICIAN ASSISTANT

## 2024-09-12 PROCEDURE — 87591 N.GONORRHOEAE DNA AMP PROB: CPT | Performed by: PHYSICIAN ASSISTANT

## 2024-09-12 PROCEDURE — G0145 SCR C/V CYTO,THINLAYER,RESCR: HCPCS | Performed by: PHYSICIAN ASSISTANT

## 2024-09-12 PROCEDURE — 87491 CHLMYD TRACH DNA AMP PROBE: CPT | Performed by: PHYSICIAN ASSISTANT

## 2024-09-16 ENCOUNTER — TELEPHONE (OUTPATIENT)
Age: 29
End: 2024-09-16

## 2024-09-16 LAB
C TRACH DNA SPEC QL NAA+PROBE: NEGATIVE
N GONORRHOEA DNA SPEC QL NAA+PROBE: NEGATIVE

## 2024-09-16 NOTE — TELEPHONE ENCOUNTER
Patient called requesting dentist clearance letter for appt this Wed 9/18. Letter must include what ob pt can have safely completed (x-rays, etc).  Please upload to pt mychart once completed.

## 2024-09-17 ENCOUNTER — TELEPHONE (OUTPATIENT)
Dept: DENTISTRY | Facility: CLINIC | Age: 29
End: 2024-09-17

## 2024-09-17 NOTE — TELEPHONE ENCOUNTER
PT called looking for an emerg dental appt because she has a broken tooth and jaw pain.  She wanted to be seen today, but has an appt elsewhere tomorrow.  I explained no dental team here today and she is opting to not go on any list and will keep appt that she has elsewhere tomorrow.  SB

## 2024-09-19 LAB
LAB AP GYN PRIMARY INTERPRETATION: NORMAL
LAB AP LMP: NORMAL
Lab: NORMAL

## 2024-10-08 PROBLEM — Z34.92 PRENATAL CARE IN SECOND TRIMESTER: Status: ACTIVE | Noted: 2024-10-08

## 2024-10-08 NOTE — PROGRESS NOTES
OB/GYN  PN Visit  Dilcia Kilgore  [id number]  10/10/2024  3:46 PM  JOSE D Odell    S: 29 y.o.  17w0d here for PN visit. Pregnancy complicated by chiari malformation, hx of LTCS, obesity, and history of PIH.     OB complaints:  Denies c/o n/v/ha, no edema, no smoking, no DV.   No vb/lof  No cramping/ctxns or signs of PTL.    She was diagnosed with bacterial pneumonia. She is now on antibiotics (started last night). She was seen by ED.    O:    Pre- Vitals      Flowsheet Row Most Recent Value   Prenatal Assessment    Fetal Heart Rate 155   Prenatal Vitals    Blood Pressure 118/60   Weight - Scale 97.1 kg (214 lb)   Urine Albumin/Glucose    Dilation/Effacement/Station    Vaginal Drainage    Edema               Gen: no acute distress, nonlabored breathing.  OB exam completed:  +FHT.  Urine: -/-    A/P:  Problem List Items Addressed This Visit       Chiari I malformation (HCC)    Obesity in pregnancy, antepartum    History of  delivery, antepartum    Prior pregnancy complicated by PIH, antepartum, first trimester - Primary     -Baseline pre-e labs completed.         Prenatal care in second trimester     17w0d GESTATION  Labor precautions reviewed  Fetal movement reviewed  msAFP ordered today  Rh+  Labs UTD.         Relevant Orders    Alpha fetoprotein, maternal       RTC in 4 weeks    JOSE D Odell  10/10/2024  3:46 PM

## 2024-10-08 NOTE — ASSESSMENT & PLAN NOTE
17w0d GESTATION  Labor precautions reviewed  Fetal movement reviewed  msAFP ordered today  Rh+  Labs UTD.

## 2024-10-09 ENCOUNTER — HOSPITAL ENCOUNTER (EMERGENCY)
Facility: HOSPITAL | Age: 29
Discharge: HOME | End: 2024-10-09
Attending: STUDENT IN AN ORGANIZED HEALTH CARE EDUCATION/TRAINING PROGRAM
Payer: COMMERCIAL

## 2024-10-09 ENCOUNTER — NURSE TRIAGE (OUTPATIENT)
Age: 29
End: 2024-10-09

## 2024-10-09 ENCOUNTER — APPOINTMENT (EMERGENCY)
Dept: RADIOLOGY | Facility: HOSPITAL | Age: 29
End: 2024-10-09
Payer: COMMERCIAL

## 2024-10-09 ENCOUNTER — APPOINTMENT (EMERGENCY)
Dept: CT IMAGING | Facility: HOSPITAL | Age: 29
End: 2024-10-09
Payer: COMMERCIAL

## 2024-10-09 VITALS
HEART RATE: 95 BPM | DIASTOLIC BLOOD PRESSURE: 71 MMHG | RESPIRATION RATE: 16 BRPM | SYSTOLIC BLOOD PRESSURE: 138 MMHG | OXYGEN SATURATION: 98 % | TEMPERATURE: 98.1 F

## 2024-10-09 DIAGNOSIS — O99.512 PNEUMONIA AFFECTING PREGNANCY IN SECOND TRIMESTER: Primary | ICD-10-CM

## 2024-10-09 DIAGNOSIS — J18.9 PNEUMONIA AFFECTING PREGNANCY IN SECOND TRIMESTER: Primary | ICD-10-CM

## 2024-10-09 LAB
ANION GAP SERPL CALCULATED.3IONS-SCNC: 6 MMOL/L (ref 4–13)
ATRIAL RATE: 107 BPM
BASOPHILS # BLD AUTO: 0.05 THOUSANDS/ΜL (ref 0–0.1)
BASOPHILS NFR BLD AUTO: 1 % (ref 0–1)
BUN SERPL-MCNC: 4 MG/DL (ref 5–25)
CALCIUM SERPL-MCNC: 9.9 MG/DL (ref 8.4–10.2)
CHLORIDE SERPL-SCNC: 104 MMOL/L (ref 96–108)
CO2 SERPL-SCNC: 24 MMOL/L (ref 21–32)
CREAT SERPL-MCNC: 0.59 MG/DL (ref 0.6–1.3)
D DIMER PPP FEU-MCNC: 0.64 UG/ML FEU
EOSINOPHIL # BLD AUTO: 0.13 THOUSAND/ΜL (ref 0–0.61)
EOSINOPHIL NFR BLD AUTO: 1 % (ref 0–6)
ERYTHROCYTE [DISTWIDTH] IN BLOOD BY AUTOMATED COUNT: 14.6 % (ref 11.6–15.1)
GFR SERPL CREATININE-BSD FRML MDRD: 126 ML/MIN/1.73SQ M
GLUCOSE SERPL-MCNC: 84 MG/DL (ref 65–140)
HCT VFR BLD AUTO: 38.1 % (ref 34.8–46.1)
HGB BLD-MCNC: 12.5 G/DL (ref 11.5–15.4)
IMM GRANULOCYTES # BLD AUTO: 0.04 THOUSAND/UL (ref 0–0.2)
IMM GRANULOCYTES NFR BLD AUTO: 0 % (ref 0–2)
LYMPHOCYTES # BLD AUTO: 1.17 THOUSANDS/ΜL (ref 0.6–4.47)
LYMPHOCYTES NFR BLD AUTO: 11 % (ref 14–44)
MCH RBC QN AUTO: 27.7 PG (ref 26.8–34.3)
MCHC RBC AUTO-ENTMCNC: 32.8 G/DL (ref 31.4–37.4)
MCV RBC AUTO: 84 FL (ref 82–98)
MONOCYTES # BLD AUTO: 1.01 THOUSAND/ΜL (ref 0.17–1.22)
MONOCYTES NFR BLD AUTO: 10 % (ref 4–12)
NEUTROPHILS # BLD AUTO: 7.88 THOUSANDS/ΜL (ref 1.85–7.62)
NEUTS SEG NFR BLD AUTO: 77 % (ref 43–75)
NRBC BLD AUTO-RTO: 0 /100 WBCS
P AXIS: 71 DEGREES
PLATELET # BLD AUTO: 274 THOUSANDS/UL (ref 149–390)
PMV BLD AUTO: 10.3 FL (ref 8.9–12.7)
POTASSIUM SERPL-SCNC: 3.7 MMOL/L (ref 3.5–5.3)
PR INTERVAL: 146 MS
QRS AXIS: 97 DEGREES
QRSD INTERVAL: 80 MS
QT INTERVAL: 322 MS
QTC INTERVAL: 429 MS
RBC # BLD AUTO: 4.52 MILLION/UL (ref 3.81–5.12)
SODIUM SERPL-SCNC: 134 MMOL/L (ref 135–147)
T WAVE AXIS: 12 DEGREES
VENTRICULAR RATE: 107 BPM
WBC # BLD AUTO: 10.28 THOUSAND/UL (ref 4.31–10.16)

## 2024-10-09 PROCEDURE — 99285 EMERGENCY DEPT VISIT HI MDM: CPT

## 2024-10-09 PROCEDURE — 71275 CT ANGIOGRAPHY CHEST: CPT

## 2024-10-09 PROCEDURE — 85379 FIBRIN DEGRADATION QUANT: CPT | Performed by: STUDENT IN AN ORGANIZED HEALTH CARE EDUCATION/TRAINING PROGRAM

## 2024-10-09 PROCEDURE — 93010 ELECTROCARDIOGRAM REPORT: CPT | Performed by: STUDENT IN AN ORGANIZED HEALTH CARE EDUCATION/TRAINING PROGRAM

## 2024-10-09 PROCEDURE — 93005 ELECTROCARDIOGRAM TRACING: CPT

## 2024-10-09 PROCEDURE — 85025 COMPLETE CBC W/AUTO DIFF WBC: CPT | Performed by: STUDENT IN AN ORGANIZED HEALTH CARE EDUCATION/TRAINING PROGRAM

## 2024-10-09 PROCEDURE — 80048 BASIC METABOLIC PNL TOTAL CA: CPT | Performed by: STUDENT IN AN ORGANIZED HEALTH CARE EDUCATION/TRAINING PROGRAM

## 2024-10-09 PROCEDURE — 71045 X-RAY EXAM CHEST 1 VIEW: CPT

## 2024-10-09 PROCEDURE — 36415 COLL VENOUS BLD VENIPUNCTURE: CPT | Performed by: STUDENT IN AN ORGANIZED HEALTH CARE EDUCATION/TRAINING PROGRAM

## 2024-10-09 RX ORDER — ACETAMINOPHEN 325 MG/1
975 TABLET ORAL ONCE
Status: COMPLETED | OUTPATIENT
Start: 2024-10-09 | End: 2024-10-09

## 2024-10-09 RX ORDER — AZITHROMYCIN 250 MG/1
TABLET, FILM COATED ORAL
Qty: 6 TABLET | Refills: 0 | Status: SHIPPED | OUTPATIENT
Start: 2024-10-09 | End: 2024-10-13

## 2024-10-09 RX ORDER — CEFPODOXIME PROXETIL 200 MG/1
200 TABLET, FILM COATED ORAL 2 TIMES DAILY
Qty: 28 TABLET | Refills: 0 | Status: SHIPPED | OUTPATIENT
Start: 2024-10-09 | End: 2024-10-23

## 2024-10-09 RX ADMIN — IOHEXOL 85 ML: 350 INJECTION, SOLUTION INTRAVENOUS at 14:07

## 2024-10-09 RX ADMIN — ACETAMINOPHEN 975 MG: 325 TABLET ORAL at 11:20

## 2024-10-09 NOTE — DISCHARGE INSTRUCTIONS
You were seen in the Emergency Department for: Cough and chest pain    Your workup today showed: A left upper lobe pneumonia    Your next steps should include: [call today to make an appointment with your primary care provider in one week]    Reasons to RETURN IMMEDIATELY to the Emergency Department: worsening symptoms, difficulty breathing, temperature > 100.4 degrees, uncontrollable nausea/vomiting, or any other concerns.

## 2024-10-09 NOTE — TELEPHONE ENCOUNTER
Regarding: bad cough  ----- Message from Selina ELIZONDO sent at 10/9/2024  8:39 AM EDT -----  Pt  called 16 weeks  established with CW having a bad cough  that  is getting worse  does not have a PCP  and wants to know what  she should  do

## 2024-10-09 NOTE — ED PROVIDER NOTES
Final diagnoses:   Pneumonia affecting pregnancy in second trimester     ED Disposition       ED Disposition   Discharge    Condition   Stable    Date/Time   Wed Oct 9, 2024  3:36 PM    Comment   Dilcia Kilgore discharge to home/self care.                   Assessment & Plan       Medical Decision Making  29-year-old -0-3-1 at 16W 6D with history of gestational hypertension on daily aspirin and migraines who presents with 4 days of cough productive of clear sputum with associated substernal chest pain.  Her vitals here notable for systolic 151, tachycardia to 107 on EKG but subsequently resolved to normal heart rate and normal respirations with normal SpO2 on room air.  She is afebrile and has a normal cardiopulmonary examination and is in no acute distress.  She has no lower extremity edema or Homans' sign.  Despite hypertensive systolic blood pressure, it is lower than 160 and patient is under 20 weeks gestation therefore not preeclampsia.  Given that patient was tachycardic over 100 and has S1Q3T3 on EKG with no priors for comparison, will send D-dimer.  Patient counseled on radiation exposure from chest x-ray and potential for CTA PE.  She understood the risks and benefits and agreed to imaging if necessary.    Differential diagnosis includes but is not limited to: Pneumonia, viral respiratory infection, I have a low clinical suspicion for anemia, ACS, PE, pneumothorax, Boerhaave's, dissection, or pericarditis/myocarditis    Workup and treatment as below:    Imaging: Chest x-ray (ED course)  EKG: Rate 107, sinus tachycardia with right axis deviation, normal intervals, S1 Q3 T3 present without other ST elevations/depressions or T wave inversions.  No priors available for comparison.  Labs: See ED course  Meds: Tylenol  Consults: N/A  Reassessment: Patient's pain improved status post analgesia    Dispo: Patient was discharged to home with strict return precautions. Patient acknowledged understanding of  plan and diagnostic results, and all their questions were answered to their satisfaction.    Amount and/or Complexity of Data Reviewed  Independent Historian: friend  Labs: ordered. Decision-making details documented in ED Course.  Radiology: ordered and independent interpretation performed. Decision-making details documented in ED Course.  ECG/medicine tests: ordered and independent interpretation performed.    Risk  OTC drugs.  Prescription drug management.        ED Course as of 10/09/24 1659   Wed Oct 09, 2024   1213 Hemoglobin: 12.5  No anemia   1213 WBC(!): 10.28  Mild, nonspecific   1237 D-Dimer, Quant(!): 0.64   1358 CXR with L perihilar opacity, possible PNA       Medications   acetaminophen (TYLENOL) tablet 975 mg (975 mg Oral Given 10/9/24 1120)   iohexol (OMNIPAQUE) 350 MG/ML injection (MULTI-DOSE) 85 mL (85 mL Intravenous Given 10/9/24 1407)       ED Risk Strat Scores                                               History of Present Illness       Chief Complaint   Patient presents with    Cough     Cough for a few days. Developed chest pain last night. Productive cough started this morning. 16w6d preg. +SOB       Past Medical History:   Diagnosis Date    Encounter for induction of labor 2022    Gestational hypertension 2022    Admitted at 32 weeks for prolonged headache. Had at least 2 elevated blood pressures in pregnancy, 1 when she presented for vaginal bleeding and the other when she presented for headache.  Her headache took a few days before resolving. Currently she is on magnesium oxide 400 milligrams daily, riboflavin 400 milligrams daily, Flexeril 10 milligrams 3 times a day and cyproheptadine 4 milligrams 3 ti    Hypertension     gestational HTN - no meds    Migraine     Miscarriage     PONV (postoperative nausea and vomiting)     Seizures (HCC)     last one 4 years ago.    Varicella     vaccinated      Past Surgical History:   Procedure Laterality Date    MT  DELIVERY ONLY  N/A 2022    Procedure:  SECTION ();  Surgeon: Kelin Regalado MD;  Location: [facility];  Service: Obstetrics    TN TX MISSED  FIRST TRIMESTER SURGICAL N/A 2023    Procedure: DILATATION AND EVACUATION (D&E) ( 11 weeks);  Surgeon: Jojo Hilario MD;  Location: [facility];  Service: Gynecology    TONSILLECTOMY      WISDOM TOOTH EXTRACTION        Family History   Problem Relation Age of Onset    Diabetes Mother         type 1 (diagnosed age 40)    No Known Problems Brother     Diabetes Maternal Grandmother         type 2    Cancer Maternal Grandfather         prostate? colon?      Social History     Tobacco Use    Smoking status: Never    Smokeless tobacco: Never   Vaping Use    Vaping status: Never Used   Substance Use Topics    Alcohol use: Not Currently     Comment: pregnant    Drug use: Never      E-Cigarette/Vaping    E-Cigarette Use Never User       E-Cigarette/Vaping Substances    Nicotine No     THC No     CBD No     Flavoring No     Other No     Unknown No       I have reviewed and agree with the history as documented.     29-year-old -0-3-1 at 68W 6D with history of gestational hypertension and migraines presenting with cough and chest pain.  She reports that she has had cough with associated shortness of breath since Saturday that is productive of clear sputum with no hemoptysis, fevers/chills, nausea/vomiting, leg swelling/calf pain, recent immobilization, family history of clotting diathesis.  Yesterday she developed substernal dull chest pain without radiation, diaphoresis, pleurisy, exertional pain that is worsened with cough.  She has not taken any medication for the symptoms.  She takes a daily ASA 81 mg for gestational hypertension history.  She currently denies headache, blurred vision, abdominal pain, nausea/vomiting, or any other complaints.    She denies decreased fetal movement, leakage of vaginal fluid, vaginal bleeding, abdominal  pain.      Cough  Associated symptoms: chest pain and shortness of breath    Associated symptoms: no chills, no ear pain, no fever, no headaches, no rash and no sore throat        Review of Systems   Constitutional:  Negative for chills and fever.   HENT:  Negative for ear pain and sore throat.    Eyes:  Negative for pain and visual disturbance.   Respiratory:  Positive for cough and shortness of breath.    Cardiovascular:  Positive for chest pain. Negative for palpitations and leg swelling.   Gastrointestinal:  Positive for nausea. Negative for abdominal pain, constipation, diarrhea and vomiting.   Genitourinary:  Negative for dysuria, hematuria, vaginal bleeding and vaginal discharge.   Musculoskeletal:  Negative for arthralgias and back pain.   Skin:  Negative for color change and rash.   Neurological:  Negative for dizziness, seizures, syncope, facial asymmetry, speech difficulty, weakness, light-headedness, numbness and headaches.   All other systems reviewed and are negative.          Objective       ED Triage Vitals [10/09/24 1036]   Temperature Pulse Blood Pressure Respirations SpO2 Patient Position - Orthostatic VS   98.1 °F (36.7 °C) 91 151/73 20 97 % Sitting      Temp Source Heart Rate Source BP Location FiO2 (%) Pain Score    Oral Monitor Right arm -- --      Vitals      Date and Time Temp Pulse SpO2 Resp BP Pain Score FACES Pain Rating User   10/09/24 1300 -- 95 98 % 16 138/71 -- -- JDT   10/09/24 1036 98.1 °F (36.7 °C) 91 97 % 20 151/73 -- -- KM            Physical Exam  Constitutional:       General: She is not in acute distress.     Appearance: Normal appearance. She is normal weight. She is not ill-appearing or toxic-appearing.   HENT:      Head: Normocephalic.      Nose: Nose normal.      Mouth/Throat:      Mouth: Mucous membranes are moist.      Pharynx: Oropharynx is clear.   Eyes:      Conjunctiva/sclera: Conjunctivae normal.   Cardiovascular:      Rate and Rhythm: Regular rhythm. Tachycardia  present.      Heart sounds: Normal heart sounds.   Pulmonary:      Effort: Pulmonary effort is normal.      Breath sounds: Normal breath sounds.   Abdominal:      General: Abdomen is flat.      Palpations: Abdomen is soft.      Tenderness: There is no abdominal tenderness.   Skin:     General: Skin is warm and dry.      Capillary Refill: Capillary refill takes less than 2 seconds.   Neurological:      Mental Status: She is alert and oriented to person, place, and time.   Psychiatric:         Mood and Affect: Mood normal.         Results Reviewed       Procedure Component Value Units Date/Time    D-Dimer [[id number]]  (Abnormal) Collected: 10/09/24 1132    Lab Status: Final result Specimen: Blood from Arm, Right Updated: 10/09/24 1225     D-Dimer, Quant 0.64 ug/ml FEU     Basic metabolic panel [[id number]]  (Abnormal) Collected: 10/09/24 1132    Lab Status: Final result Specimen: Blood from Arm, Right Updated: 10/09/24 1157     Sodium 134 mmol/L      Potassium 3.7 mmol/L      Chloride 104 mmol/L      CO2 24 mmol/L      ANION GAP 6 mmol/L      BUN 4 mg/dL      Creatinine 0.59 mg/dL      Glucose 84 mg/dL      Calcium 9.9 mg/dL      eGFR 126 ml/min/1.73sq m     Narrative:      National Kidney Disease Foundation guidelines for Chronic Kidney Disease (CKD):     Stage 1 with normal or high GFR (GFR > 90 mL/min/1.73 square meters)    Stage 2 Mild CKD (GFR = 60-89 mL/min/1.73 square meters)    Stage 3A Moderate CKD (GFR = 45-59 mL/min/1.73 square meters)    Stage 3B Moderate CKD (GFR = 30-44 mL/min/1.73 square meters)    Stage 4 Severe CKD (GFR = 15-29 mL/min/1.73 square meters)    Stage 5 End Stage CKD (GFR <15 mL/min/1.73 square meters)  Note: GFR calculation is accurate only with a steady state creatinine    CBC and differential [[id number]]  (Abnormal) Collected: 10/09/24 1132    Lab Status: Final result Specimen: Blood from Arm, Right Updated: 10/09/24 1145     WBC 10.28 Thousand/uL      RBC 4.52 Million/uL      Hemoglobin  12.5 g/dL      Hematocrit 38.1 %      MCV 84 fL      MCH 27.7 pg      MCHC 32.8 g/dL      RDW 14.6 %      MPV 10.3 fL      Platelets 274 Thousands/uL      nRBC 0 /100 WBCs      Segmented % 77 %      Immature Grans % 0 %      Lymphocytes % 11 %      Monocytes % 10 %      Eosinophils Relative 1 %      Basophils Relative 1 %      Absolute Neutrophils 7.88 Thousands/µL      Absolute Immature Grans 0.04 Thousand/uL      Absolute Lymphocytes 1.17 Thousands/µL      Absolute Monocytes 1.01 Thousand/µL      Eosinophils Absolute 0.13 Thousand/µL      Basophils Absolute 0.05 Thousands/µL             CTA chest pe study   Final Interpretation by Antonio Hein MD (10/09 8315)      1. Contrast bolus timing suboptimal. No definite pulmonary embolism.      2. Left upper lobe pneumonia.               Resident: LONNY EASLEY I, the attending radiologist, have reviewed the images and agree with the final report above.      Workstation performed: [id number]         XR chest 1 view portable   Final Interpretation by Antonio Hein MD (10/09 1355)      Indeterminate left perihilar opacity, possibly pneumonia. Follow-up CT scan has already been ordered at the time of this dictation.            Resident: Davi Mena I, the attending radiologist, have reviewed the images and agree with the final report above.      Workstation performed: [id number]             Procedures    ED Medication and Procedure Management   Prior to Admission Medications   Prescriptions Last Dose Informant Patient Reported? Taking?   Prenatal Vit-Fe Fumarate-FA (PRENATAL VITAMIN PO)   Yes No   Sig: Take by mouth   acetaminophen (TYLENOL) 325 mg tablet  Self No No   Sig: Take 3 tablets (975 mg total) by mouth every 8 (eight) hours as needed for mild pain, headaches or fever   aspirin (ECOTRIN LOW STRENGTH) 81 mg EC tablet   No No   Sig: Take 2 tablets (162 mg total) by mouth daily      Facility-Administered Medications: None     Discharge  Medication List as of 10/9/2024  3:36 PM        START taking these medications    Details   azithromycin (ZITHROMAX) 250 mg tablet Take 2 tablets today then 1 tablet daily x 4 days, Print      cefpodoxime (VANTIN) 200 mg tablet Take 1 tablet (200 mg total) by mouth 2 (two) times a day for 14 days, Starting Wed 10/9/2024, Until Wed 10/23/2024, Print           CONTINUE these medications which have NOT CHANGED    Details   acetaminophen (TYLENOL) 325 mg tablet Take 3 tablets (975 mg total) by mouth every 8 (eight) hours as needed for mild pain, headaches or fever, Starting Sun 8/21/2022, No Print      aspirin (ECOTRIN LOW STRENGTH) 81 mg EC tablet Take 2 tablets (162 mg total) by mouth daily, Starting Fri 9/6/2024, Normal      Prenatal Vit-Fe Fumarate-FA (PRENATAL VITAMIN PO) Take by mouth, Historical Med           No discharge procedures on file.  ED SEPSIS DOCUMENTATION   Time reflects when diagnosis was documented in both MDM as applicable and the Disposition within this note       Time User Action Codes Description Comment    10/9/2024  2:51 PM Aleida Cosme Add [O99.512,  J18.9] Pneumonia affecting pregnancy in second trimester                  Aleida Cosme MD  10/09/24 1709

## 2024-10-09 NOTE — TELEPHONE ENCOUNTER
"16w6d OB patient reports mild persistent chest pain, worsens with coughing. Also reports moderate cough. Denies fevers. Denies difficulty breathing, but states needs to catch breath after cough. Neighbors have pneumonia and patient was recently exposed.     Reviewed safe meds patient can take during pregnancy for symptom management. Advised increase hydration. Also advised ER now for persistent chest pain/exposure to pneumonia. Patient agreeable. Patient plans on going to [facility] for evaluation. ESC sent to provider as fyi.     Reason for Disposition   Chest pain  (Exception: MILD central chest pain, present only when coughing.)    Answer Assessment - Initial Assessment Questions  1. ONSET: \"When did the cough begin?\"       This morning  2. SEVERITY: \"How bad is the cough today?\"       Moderate - happens every couple minutes  3. SPUTUM: \"Describe the color of your sputum\" (e.g., none, dry cough; clear, white, yellow, green)      No color to phlegm this morning  4. HEMOPTYSIS: \"Are you coughing up any blood?\" If Yes, ask: \"How much?\" (e.g., flecks, streaks, tablespoons, etc.)      No  5. DIFFICULTY BREATHING: \"Are you having difficulty breathing?\" If Yes, ask: \"How bad is it?\" (e.g., mild, moderate, severe)       Yes - mild  6. FEVER: \"Do you have a fever?\" If Yes, ask: \"What is your temperature, how was it measured, and when did it start?\"      Denies  7. CARDIAC HISTORY: \"Do you have any history of heart disease?\" (e.g., heart attack, congestive heart failure)       Denies  8. LUNG HISTORY: \"Do you have any history of lung disease?\"  (e.g., pulmonary embolus, asthma, emphysema)      Denies  9. PE RISK FACTORS: \"Do you have a history of blood clots?\" (or: recent major surgery, recent prolonged travel, bedridden)      Denies  10. OTHER SYMPTOMS: \"Do you have any other symptoms?\" (e.g., runny nose, wheezing, chest pain)        Chest pain - started last night, irritation/pounding feeling, worsens when coughing.  11. " "PREGNANCY: \"Is there any chance you are pregnant?\" \"When was your last menstrual period?\"        16w6d  12. TRAVEL: \"Have you traveled out of the country in the last month?\" (e.g., travel history, exposures)        Neighbors have pneumonia - recently exposed    Protocols used: Cough - Acute Productive-Adult-AH    "

## 2024-10-10 ENCOUNTER — ROUTINE PRENATAL (OUTPATIENT)
Dept: OBGYN CLINIC | Facility: CLINIC | Age: 29
End: 2024-10-10
Payer: COMMERCIAL

## 2024-10-10 VITALS
DIASTOLIC BLOOD PRESSURE: 60 MMHG | WEIGHT: 214 LBS | SYSTOLIC BLOOD PRESSURE: 118 MMHG | BODY MASS INDEX: 35.65 KG/M2 | HEIGHT: 65 IN

## 2024-10-10 DIAGNOSIS — Z34.92 PRENATAL CARE IN SECOND TRIMESTER: ICD-10-CM

## 2024-10-10 DIAGNOSIS — O34.219 HISTORY OF CESAREAN DELIVERY, ANTEPARTUM: ICD-10-CM

## 2024-10-10 DIAGNOSIS — O09.892 PRIOR PREGNANCY COMPLICATED BY PIH, ANTEPARTUM, SECOND TRIMESTER: Primary | ICD-10-CM

## 2024-10-10 DIAGNOSIS — O99.210 OBESITY IN PREGNANCY, ANTEPARTUM: ICD-10-CM

## 2024-10-10 DIAGNOSIS — G93.5 CHIARI I MALFORMATION (HCC): ICD-10-CM

## 2024-10-10 PROCEDURE — 99213 OFFICE O/P EST LOW 20 MIN: CPT

## 2024-10-31 ENCOUNTER — APPOINTMENT (OUTPATIENT)
Dept: LAB | Facility: CLINIC | Age: 29
End: 2024-10-31
Payer: COMMERCIAL

## 2024-10-31 DIAGNOSIS — Z34.92 PRENATAL CARE IN SECOND TRIMESTER: ICD-10-CM

## 2024-10-31 PROCEDURE — 36415 COLL VENOUS BLD VENIPUNCTURE: CPT

## 2024-10-31 PROCEDURE — 82105 ALPHA-FETOPROTEIN SERUM: CPT

## 2024-11-02 LAB
2ND TRIMESTER 4 SCREEN SERPL-IMP: NORMAL
AFP ADJ MOM SERPL: 1.85
AFP INTERP AMN-IMP: NORMAL
AFP INTERP SERPL-IMP: NORMAL
AFP INTERP SERPL-IMP: NORMAL
AFP SERPL-MCNC: 82.5 NG/ML
AGE AT DELIVERY: 27.5 YR
GA METHOD: NORMAL
GA: 20 WEEKS
IDDM PATIENT QL: NO
MULTIPLE PREGNANCY: NO
NEURAL TUBE DEFECT RISK FETUS: 1135 %

## 2024-11-04 ENCOUNTER — ROUTINE PRENATAL (OUTPATIENT)
Dept: OBGYN CLINIC | Facility: CLINIC | Age: 29
End: 2024-11-04
Payer: COMMERCIAL

## 2024-11-04 VITALS
SYSTOLIC BLOOD PRESSURE: 118 MMHG | WEIGHT: 220 LBS | HEIGHT: 65 IN | DIASTOLIC BLOOD PRESSURE: 60 MMHG | BODY MASS INDEX: 36.65 KG/M2

## 2024-11-04 DIAGNOSIS — Z3A.20 20 WEEKS GESTATION OF PREGNANCY: ICD-10-CM

## 2024-11-04 DIAGNOSIS — G43.719 INTRACTABLE CHRONIC MIGRAINE WITHOUT AURA AND WITHOUT STATUS MIGRAINOSUS: Primary | ICD-10-CM

## 2024-11-04 PROCEDURE — 99212 OFFICE O/P EST SF 10 MIN: CPT | Performed by: STUDENT IN AN ORGANIZED HEALTH CARE EDUCATION/TRAINING PROGRAM

## 2024-11-04 RX ORDER — CALCIUM CARBONATE/VITAMIN D3 500-10/5ML
400 LIQUID (ML) ORAL DAILY
Qty: 60 TABLET | Refills: 2 | Status: SHIPPED | OUTPATIENT
Start: 2024-11-04 | End: 2025-01-03

## 2024-11-04 NOTE — PROGRESS NOTES
Assessment/Plan  Problem List Items Addressed This Visit       Intractable chronic migraine without aura and without status migrainosus - Primary    Relevant Medications    Magnesium Oxide 400 MG CAPS    Riboflavin (B-2-400) 400 MG CAPS       Cesar Ha is a 29 y.o. female,  with an Estimated Date of Delivery: 3/20/25 with a current gestational age of 20w4d. Patient reports {symptoms:13861}. Fetal movement: {Fetal Movement:}.     History  The following portions of the patient's history were reviewed and updated as appropriate: allergies, current medications, past family history, past medical history, past social history, past surgical history and problem list.    Objective  Vitals:    24 0900   BP: 118/90     FHT: 145  FH: ***  Urine: neg/neg***

## 2024-11-04 NOTE — ASSESSMENT & PLAN NOTE
Dilcia presents today for her 20 week visit.? She is currently 20w4d.      Vitals:    11/04/24 0900   BP: 118/90         We reviewed exclusive breastfeeding helps your baby's gut grow lots of healthy bacteria and switching between formula and breastmilk can affect how that healthy bacteria grows. Using formula, especially in the first few weeks, can affect you milk supply. It's a supply and demand system for breast milk production. So if you are not removing milk from the breasts every time the baby eats, your supply may go down. If you need to supplement, by pediatrician recommendation, make sure that you are pumping every time baby is receiving formula. This will help to maintain your breast milk supply.     Level II scheduled     All questions have been answered to her satisfaction.

## 2024-11-04 NOTE — PROGRESS NOTES
Assessment/Plan  Problem List Items Addressed This Visit       Intractable chronic migraine without aura and without status migrainosus - Primary    Relevant Medications    Magnesium Oxide 400 MG CAPS    Riboflavin (B-2-400) 400 MG CAPS    20 weeks gestation of pregnancy       Dilcia presents today for her 20 week visit.? She is currently 20w4d.      Vitals:    24 0900   BP: 118/90         We reviewed exclusive breastfeeding helps your baby's gut grow lots of healthy bacteria and switching between formula and breastmilk can affect how that healthy bacteria grows. Using formula, especially in the first few weeks, can affect you milk supply. It's a supply and demand system for breast milk production. So if you are not removing milk from the breasts every time the baby eats, your supply may go down. If you need to supplement, by pediatrician recommendation, make sure that you are pumping every time baby is receiving formula. This will help to maintain your breast milk supply.     Level II scheduled     All questions have been answered to her satisfaction.                     Subjective    Dilcia is a 29 y.o. female,  with an Estimated Date of Delivery: 3/20/25 with a current gestational age of 20w4d. Patient reports nausea and intermittent HAs . Fetal movement: active.     History  The following portions of the patient's history were reviewed and updated as appropriate: allergies, current medications, past family history, past medical history, past social history, past surgical history and problem list.    Objective  Vitals:    24 0900   BP: 118/60     FHT: 145  Urine: neg/neg

## 2024-11-05 ENCOUNTER — ROUTINE PRENATAL (OUTPATIENT)
Dept: PERINATAL CARE | Facility: OTHER | Age: 29
End: 2024-11-05
Payer: COMMERCIAL

## 2024-11-05 VITALS
SYSTOLIC BLOOD PRESSURE: 138 MMHG | WEIGHT: 217.8 LBS | DIASTOLIC BLOOD PRESSURE: 70 MMHG | HEIGHT: 65 IN | HEART RATE: 98 BPM | BODY MASS INDEX: 36.29 KG/M2

## 2024-11-05 DIAGNOSIS — Z36.3 ENCOUNTER FOR ANTENATAL SCREENING FOR MALFORMATIONS: ICD-10-CM

## 2024-11-05 DIAGNOSIS — E66.812 CLASS 2 OBESITY DUE TO EXCESS CALORIES WITHOUT SERIOUS COMORBIDITY WITH BODY MASS INDEX (BMI) OF 36.0 TO 36.9 IN ADULT: ICD-10-CM

## 2024-11-05 DIAGNOSIS — E66.09 OTHER OBESITY DUE TO EXCESS CALORIES AFFECTING PREGNANCY IN SECOND TRIMESTER: Primary | ICD-10-CM

## 2024-11-05 DIAGNOSIS — Z36.86 ENCOUNTER FOR ANTENATAL SCREENING FOR CERVICAL LENGTH: ICD-10-CM

## 2024-11-05 DIAGNOSIS — E66.09 CLASS 2 OBESITY DUE TO EXCESS CALORIES WITHOUT SERIOUS COMORBIDITY WITH BODY MASS INDEX (BMI) OF 36.0 TO 36.9 IN ADULT: ICD-10-CM

## 2024-11-05 DIAGNOSIS — Z3A.20 20 WEEKS GESTATION OF PREGNANCY: ICD-10-CM

## 2024-11-05 DIAGNOSIS — O99.212 OTHER OBESITY DUE TO EXCESS CALORIES AFFECTING PREGNANCY IN SECOND TRIMESTER: Primary | ICD-10-CM

## 2024-11-05 DIAGNOSIS — O09.891 PRIOR PREGNANCY COMPLICATED BY PIH, ANTEPARTUM, FIRST TRIMESTER: ICD-10-CM

## 2024-11-05 DIAGNOSIS — O34.219 HISTORY OF CESAREAN DELIVERY, ANTEPARTUM: ICD-10-CM

## 2024-11-05 PROCEDURE — 76817 TRANSVAGINAL US OBSTETRIC: CPT | Performed by: OBSTETRICS & GYNECOLOGY

## 2024-11-05 PROCEDURE — 76811 OB US DETAILED SNGL FETUS: CPT | Performed by: OBSTETRICS & GYNECOLOGY

## 2024-11-05 PROCEDURE — 99213 OFFICE O/P EST LOW 20 MIN: CPT | Performed by: NURSE PRACTITIONER

## 2024-11-05 NOTE — LETTER
2024     Huyen Merchant MD  [address] [facility] [address]    Patient: Dilcia Kilgore   YOB: 1995   Date of Visit: 2024       Dear Dr. Merchant:    Thank you for referring Dilcia Kilgore to me for evaluation. Below are my notes for this consultation.    If you have questions, please do not hesitate to call me. I look forward to following your patient along with you.         Sincerely,        Clementina Escamilla MD        CC: No Recipients    Clementina Escamilla MD  2024  4:43 PM  Sign when Signing Visit  Dilcia Kilgore  has no complaints today. She is here at 20w5d  for detailed anatomic survey. She reports fetal movements and does not report any vaginal bleeding or signs of labor.  Her recently completed fetal testing revealed a low risk NIPT and negative MSAFP screen (MoM 1.85).      Problem list:  1   Pregravid BMI of 36.6.   2.   x 1  3.  History of gestational hypertension     Ultrasound findings:  A viable martinez intrauterine pregnancy is seen on today's ultrasound, measuring consistent with established EDC.  The fetal anatomic survey is complete, and no fetal anomalies are suspected.  Amniotic fluid and placenta are within normal limits.    Transvaginal cervical length is within normal limits (3.67 cm), indicating low risk for  birth.     Pregnancy ultrasound has limitations and is unable to detect all forms of fetal congenital abnormalities.       Specific counseling was provided on the following problems:  1. Her MSAFP screen and NIPT were reviewed,  2. She is interested in TOLAC.     Follow up recommended:   1. Growth scan at 32 weeks      Split-shared decision-making between JOSE D Childs and myself was utilized, with the majority of the time spent by WALTER Childs  Medical decision-making for this encounter was low diagnosis (low, data limited and risk low).     Procedures that were completed today were charged  separately.     I reviewed the ultrasound pictures and recommended the medical decision making transcribed in the care of this patient.      Clementina Escamilla MD

## 2024-11-05 NOTE — PROGRESS NOTES
Ultrasound Probe Disinfection    A transvaginal ultrasound was performed.   Prior to use, disinfection was performed with High Level Disinfection Process ([employer]).  Probe serial number [id number]: [id number] was used.    Sejal VELAZQUEZ, SELENE  11/05/24  2:31 PM

## 2024-11-05 NOTE — PROGRESS NOTES
Dilcia Kilgore  has no complaints today. She is here at 20w5d  for detailed anatomic survey. She reports fetal movements and does not report any vaginal bleeding or signs of labor.  Her recently completed fetal testing revealed a low risk NIPT and negative MSAFP screen (MoM 1.85).      Problem list:  1   Pregravid BMI of 36.6.   2.   x 1  3.  History of gestational hypertension     Ultrasound findings:  A viable martinez intrauterine pregnancy is seen on today's ultrasound, measuring consistent with established EDC.  The fetal anatomic survey is complete, and no fetal anomalies are suspected.  Amniotic fluid and placenta are within normal limits.    Transvaginal cervical length is within normal limits (3.67 cm), indicating low risk for  birth.     Pregnancy ultrasound has limitations and is unable to detect all forms of fetal congenital abnormalities.       Specific counseling was provided on the following problems:  1. Her MSAFP screen and NIPT were reviewed,  2. She is interested in TOLAC.     Follow up recommended:   1. Growth scan at 32 weeks      Split-shared decision-making between JOSE D Childs and myself was utilized, with the majority of the time spent by WALTER Childs  Medical decision-making for this encounter was low diagnosis (low, data limited and risk low).     Procedures that were completed today were charged separately.     I reviewed the ultrasound pictures and recommended the medical decision making transcribed in the care of this patient.      Clementina Escamilla MD

## 2024-11-26 ENCOUNTER — HOSPITAL ENCOUNTER (OUTPATIENT)
Facility: HOSPITAL | Age: 29
Discharge: HOME | End: 2024-11-26
Attending: OBSTETRICS & GYNECOLOGY | Admitting: OBSTETRICS & GYNECOLOGY
Payer: COMMERCIAL

## 2024-11-26 ENCOUNTER — NURSE TRIAGE (OUTPATIENT)
Age: 29
End: 2024-11-26

## 2024-11-26 VITALS
DIASTOLIC BLOOD PRESSURE: 59 MMHG | RESPIRATION RATE: 18 BRPM | TEMPERATURE: 98.1 F | SYSTOLIC BLOOD PRESSURE: 127 MMHG | HEART RATE: 81 BPM
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
## 2024-11-26 PROCEDURE — 99213 OFFICE O/P EST LOW 20 MIN: CPT

## 2024-11-26 PROCEDURE — NC001 PR NO CHARGE: Performed by: OBSTETRICS & GYNECOLOGY

## 2024-11-26 NOTE — TELEPHONE ENCOUNTER
"Patient calling in stating that she's 23w5d , and pt reporting she fell down the stairs at about 11:20am. Pt stating that her feet went out from underneath her and she fell onto her back onto the steps. Pt reporting +FM. Pt reporting back pain 4/10. Pt stating she didn't hit her abdomen.   Pt denies abdominal pain, contractions, leakage of fluid, ROM, vaginal bleeding.    Patient is advised to go to L&D for further evaluation. Pt stating that she's not going to be able to go to labor and delivery as she doesn't have care for her other child. Pt is again advised to go to L&D now, pt still refusing.     Epic Secure Chat sent to Dr Hilario- on call.           Reason for Disposition   Pregnant 20 or more weeks and fall to ground or floor (e.g., walking and tripped)    Answer Assessment - Initial Assessment Questions  1. MECHANISM: \"How did the fall happen?\"      Pt stating that she's wearing sock and the carpet on the steps is slippery and her feet slid out from underneath her.  2. DOMESTIC VIOLENCE SCREENING: \"Did you fall because someone pushed you or tried to hurt you?\"      Pt denies.   3. ONSET: \"When did the fall happen?\" (e.g., minutes, hours, or days ago)      11:20am.   4. LOCATION: \"What part of the body hit the ground?\" (e.g., back, buttocks, head, hips, knees, hands, head, stomach)      Pt stating her back, buttocks, and slid down the steps   5. INJURY: \"Did you get hurt when you fell? Are there any obvious injuries?\" If Yes, ask: \"What does the injury look like?\"      Pain and redness on the lower back.   6. PAIN: \"Is there any pain?\" If Yes, ask: \"How bad is the pain?\" (e.g., Scale 1-10; or mild,       Back pain /10.   7. SIZE: For cuts, bruises, or swelling, ask: \"How large is it?\" (e.g., inches or centimeters)      Pt denies at this time.   8. FREDY: \"What date are you expecting to deliver?\"      3/20/24   9. FETAL MOVEMENT: \"Has the baby's movement decreased or changed significantly from       Pt " "stating she has +FM   10. TETANUS: For any breaks in the skin, ask: \"When was the last tetanus booster?\"        Pt denies breaks in the skin.   11. OTHER SYMPTOMS: \"Do you have any other symptoms?\" (e.g., abdomen pain, contractions, leaking of fluid from vagina or concerned water broke, vaginal bleeding)        Pt denies abdominal pain, contractions, leakage of fluid, ROM, vaginal bleeding.    Protocols used: Pregnancy - Fall-Adult-OH    "

## 2024-11-26 NOTE — DISCHARGE INSTRUCTIONS
Pregnancy at 23 to 26 Weeks   WHAT YOU NEED TO KNOW:   What changes are happening in my body?  You are now close to or at the beginning of the third trimester. The third trimester starts at 24 weeks and ends with delivery. As your baby gets larger, you may develop certain symptoms. These may include pain in your back or down the sides of your abdomen. You may also have stretch marks on your abdomen, breasts, thighs, or buttocks. You may also have constipation.  How do I care for myself at this stage of my pregnancy?       Eat a variety of healthy foods.  Healthy foods include fruits, vegetables, whole-grain breads, low-fat dairy foods, beans, lean meats, and fish. Drink liquids as directed. Ask how much liquid to drink each day and which liquids are best for you. Limit caffeine to less than 200 milligrams each day. Limit your intake of fish to 2 servings each week. Choose fish low in mercury such as canned light tuna, shrimp, salmon, cod, or tilapia. Do not  eat fish high in mercury such as swordfish, tilefish, adrianne mackerel, and shark.         Manage back pain.  Do not stand for long periods of time or lift heavy items. Use good posture while you stand, squat, or bend. Wear low-heeled shoes with good support. Rest may also help to relieve back pain. Ask your healthcare provider about exercises you can do to strengthen your back muscles.    Take prenatal vitamins as directed.  Your need for certain vitamins and minerals, such as folic acid, increases during pregnancy. Prenatal vitamins provide some of the extra vitamins and minerals you need. Prenatal vitamins may also help to decrease the risk of certain birth defects.         Talk to your healthcare provider about exercise.  Moderate exercise can help you stay fit. Your healthcare provider will help you plan an exercise program that is safe for you during pregnancy.         Do not smoke.  Smoking increases your risk of a miscarriage and other health problems  during your pregnancy. Smoking can cause your baby to be born too early or weigh less at birth. Ask your healthcare provider for information if you need help quitting.    Do not drink alcohol.  Alcohol passes from your body to your baby through the placenta. It can affect your baby's brain development and cause fetal alcohol syndrome (FAS). FAS is a group of conditions that causes mental, behavior, and growth problems.    Talk to your healthcare provider before you take any medicines.  Many medicines may harm your baby if you take them when you are pregnant. Do not take any medicines, vitamins, herbs, or supplements without first talking to your healthcare provider. Never use illegal or street drugs (such as marijuana or cocaine) while you are pregnant.    What are some safety tips during pregnancy?   Avoid hot tubs and saunas.  Do not use a hot tub or sauna while you are pregnant, especially during your first trimester. Hot tubs and saunas may raise your baby's temperature and increase the risk of birth defects.    Avoid toxoplasmosis.  This is an infection caused by eating raw meat or being around infected cat feces. It can cause birth defects, miscarriages, and other problems. Wash your hands after you touch raw meat. Make sure any meat is well-cooked before you eat it. Avoid raw eggs and unpasteurized milk. Use gloves or ask someone else to clean your cat's litter box while you are pregnant.       What changes are happening with my baby?  By 26 weeks, your baby will weigh about 2 pounds. Your baby will be about 10 inches long from the top of the head to the rump (baby's bottom). Your baby's movements are much stronger now. Your baby's eyes are almost completely formed and can partially open. Your baby also sleeps and wakes up.  What do I need to know about prenatal care?  Your healthcare provider will check your blood pressure and weight. You may also need the following:  A urine test  may also be done to check  for sugar and protein. These can be signs of gestational diabetes or infection. Protein in your urine may also be a sign of preeclampsia. Preeclampsia is a condition that can develop during week 20 or later of your pregnancy. It causes high blood pressure, and it can cause problems with your kidneys and other organs.    A gestational diabetes screen  may be done. Your healthcare provider may order either a 1-step or 2-step oral glucose tolerance test (OGTT).     1-step OGTT:  Your blood sugar level will be tested after you have not eaten for 8 hours (fasting). You will then be given a glucose drink. Your level will be tested again 1 hour and 2 hours after you finish the drink.    2-step OGTT:  You do not have to fast for the first part of the test. You will have the glucose drink at any time of day. Your blood sugar level will be checked 1 hour later. If your blood sugar is higher than a certain level, another test will be ordered. You will fast and your blood sugar level will be tested. You will have the glucose drink. Your blood will be tested again 1 hour, 2 hours, and 3 hours after you finish the glucose drink.    Fundal height  is a measurement of your uterus to check your baby's growth. This number is usually the same as the number of weeks that you have been pregnant.    Your baby's heart rate  will be checked.    When should I seek immediate care?   You develop a severe headache that does not go away.    You have new or increased vision changes, such as blurred or spotted vision.    You have new or increased swelling in your face or hands.    You have vaginal spotting or bleeding.    Your water broke or you feel warm water gushing or trickling from your vagina.    When should I call my doctor or obstetrician?   You have abdominal cramps, pressure, or tightening.    You have a change in vaginal discharge.    You have light bleeding.    You have chills or a fever.    You have vaginal itching, burning, or  pain.    You have yellow, green, white, or foul-smelling vaginal discharge.    You have pain or burning when you urinate, less urine than usual, or pink or bloody urine.    You have questions or concerns about your condition or care.    CARE AGREEMENT:   You have the right to help plan your care. Learn about your health condition and how it may be treated. Discuss treatment options with your healthcare providers to decide what care you want to receive. You always have the right to refuse treatment. The above information is an  only. It is not intended as medical advice for individual conditions or treatments. Talk to your doctor, nurse or pharmacist before following any medical regimen to see if it is safe and effective for you.  © Copyright [employer] 2022 Information is for End User's use only and may not be sold, redistributed or otherwise used for commercial purposes. All illustrations and images included in CareNotes® are the copyrighted property of [employer] or [employer]

## 2024-11-26 NOTE — PROGRESS NOTES
L&D Triage Note - OB/GYN  Dilcia Kilgore 29 y.o. female MRN: [id number]  Unit/Bed#:  TRIAGE  Encounter: [id number]      Patient is seen by [facility]     ASSESSMENT/PLAN  Dilcia Kilgore is a 29 y.o.  at 23w5d who presented to triage after a fall down 4 stairs on her butt. Patient monitored for 1 hr with reactive FHT and no contractions on tocometry. No vaginal bleeding, gushes of fluid, or abdominal pain. Fetal movement present. Patient has mild soreness on her buttocks where she fell. Encouraged symptom management with tylenol, ice, and heat.Patient is stable for discharge home. Return precautions discussed.       1) Fall  - no belly strike, no head strike, no LOC    SVE:  deferred    FHT:  145 bpm, moderate variability, 10 x 10 accelerations, some small variable decelerations with keely >100 bpm appropriate for GA    TOCO:   No contractions or irritability         2)  Discharge instructions  - Patient instructed to call if experiencing worsening contractions, vaginal bleeding, loss of fluid or decreased fetal movement.  - Will follow up with OBGYN in office    D/w Dr. Hilario  ______________    SUBJECTIVE    FREDY: Estimated Date of Delivery: 3/20/25    HPI:  29 y.o.  23w5d presents with complaint of a fall down 4 stairs this afternoon. She mis-stepped and slid down the stairs on her buttocks. No symptoms prior to fall. She denies head strike, belly strike, loss of consciousness.      Contractions: denies  Leakage of fluid: denies  Vaginal Bleeding: denies  Fetal movement: present    Her obstetrical history is significant for 1 term CD and 3 SAB    ROS:  Constitutional: Negative  Respiratory: Negative  Cardiovascular: Negative    Gastrointestinal: Negative    Physical Exam  GEN: Well appearing, no apparent distress, A&Ox4, no traumatic injuries    ABD: Gravid, soft, non-tender  SVE: deferred    OBJECTIVE:  /59 (BP Location: Right arm)   Pulse 81   Temp 98.1 °F (36.7 °C) (Oral)  "  Resp 18   LMP 06/13/2024 (Exact Date)   There is no height or weight on file to calculate BMI.  Labs: No results found for this or any previous visit (from the past 24 hours).      Preeti García MD  OB/GYN PGY-2  11/27/2024  7:04 AM      Portions of the record may have been created with voice recognition software.  Occasional wrong word or \"sound a like\" substitutions may have occurred due to the inherent limitations of voice recognition software.  Read the chart carefully and recognize, using context, where substitutions have occurred   "

## 2024-11-26 NOTE — DISCHARGE INSTR - AVS FIRST PAGE
Please call or return if you experience a gush of watery fluid, heavy vaginal bleeding, abdominal pain or cramping, decreased fetal movement.  Please follow-up with your routinely scheduled prenatal appointments.

## 2024-12-05 ENCOUNTER — ROUTINE PRENATAL (OUTPATIENT)
Dept: OBGYN CLINIC | Facility: CLINIC | Age: 29
End: 2024-12-05
Payer: COMMERCIAL

## 2024-12-05 VITALS
BODY MASS INDEX: 38.32 KG/M2 | DIASTOLIC BLOOD PRESSURE: 62 MMHG | SYSTOLIC BLOOD PRESSURE: 144 MMHG | WEIGHT: 230 LBS | HEIGHT: 65 IN

## 2024-12-05 DIAGNOSIS — O34.219 HISTORY OF CESAREAN DELIVERY, ANTEPARTUM: ICD-10-CM

## 2024-12-05 DIAGNOSIS — Z34.93 PRENATAL CARE IN THIRD TRIMESTER: Primary | ICD-10-CM

## 2024-12-05 DIAGNOSIS — O16.9 ELEVATED BLOOD PRESSURE AFFECTING PREGNANCY, ANTEPARTUM: ICD-10-CM

## 2024-12-05 PROCEDURE — 99213 OFFICE O/P EST LOW 20 MIN: CPT | Performed by: STUDENT IN AN ORGANIZED HEALTH CARE EDUCATION/TRAINING PROGRAM

## 2024-12-05 NOTE — PROGRESS NOTES
Dilcia is a a 29-year-old -0-3-1 at 25 weeks gestation presenting for routine prenatal care-she denies any cramping, contractions, loss of fluid or vaginal bleeding.  She endorses good fetal movement.  Urine negative/negative.  Patient has a pregnancy that is complicated by history of  delivery and history of gestational hypertension in prior pregnancy.  Blood pressure is elevated today in office at 144/62-she does report occasional mild headaches that do respond to Tylenol but denies any other preeclamptic symptoms.  Third trimester labs along with preeclamptic labs were ordered today and reviewed.  Patient declines flu vaccination.  She is considering TOLAC but if she does have to be delivered earlier for gestational hypertension would prefer repeat  delivery.  We reviewed preeclamptic precautions and patient to return to office in 2 weeks or sooner if needed.

## 2024-12-10 ENCOUNTER — APPOINTMENT (OUTPATIENT)
Dept: LAB | Facility: CLINIC | Age: 29
End: 2024-12-10
Payer: COMMERCIAL

## 2024-12-10 ENCOUNTER — RESULTS FOLLOW-UP (OUTPATIENT)
Dept: OBGYN CLINIC | Facility: CLINIC | Age: 29
End: 2024-12-10

## 2024-12-10 DIAGNOSIS — Z34.93 PRENATAL CARE IN THIRD TRIMESTER: ICD-10-CM

## 2024-12-10 DIAGNOSIS — O16.9 ELEVATED BLOOD PRESSURE AFFECTING PREGNANCY, ANTEPARTUM: ICD-10-CM

## 2024-12-10 LAB
ALBUMIN SERPL BCG-MCNC: 3.5 G/DL (ref 3.5–5)
ALP SERPL-CCNC: 64 U/L (ref 34–104)
ALT SERPL W P-5'-P-CCNC: 9 U/L (ref 7–52)
ANION GAP SERPL CALCULATED.3IONS-SCNC: 3 MMOL/L (ref 4–13)
AST SERPL W P-5'-P-CCNC: 13 U/L (ref 13–39)
BILIRUB SERPL-MCNC: 0.22 MG/DL (ref 0.2–1)
BUN SERPL-MCNC: 7 MG/DL (ref 5–25)
CALCIUM SERPL-MCNC: 9.4 MG/DL (ref 8.4–10.2)
CHLORIDE SERPL-SCNC: 108 MMOL/L (ref 96–108)
CO2 SERPL-SCNC: 24 MMOL/L (ref 21–32)
CREAT SERPL-MCNC: 0.52 MG/DL (ref 0.6–1.3)
CREAT UR-MCNC: 70.4 MG/DL
ERYTHROCYTE [DISTWIDTH] IN BLOOD BY AUTOMATED COUNT: 13.8 % (ref 11.6–15.1)
GFR SERPL CREATININE-BSD FRML MDRD: 131 ML/MIN/1.73SQ M
GLUCOSE 1H P 50 G GLC PO SERPL-MCNC: 85 MG/DL (ref 70–134)
GLUCOSE P FAST SERPL-MCNC: 89 MG/DL (ref 65–99)
HCT VFR BLD AUTO: 33.3 % (ref 34.8–46.1)
HGB BLD-MCNC: 11.1 G/DL (ref 11.5–15.4)
MCH RBC QN AUTO: 28 PG (ref 26.8–34.3)
MCHC RBC AUTO-ENTMCNC: 33.3 G/DL (ref 31.4–37.4)
MCV RBC AUTO: 84 FL (ref 82–98)
PLATELET # BLD AUTO: 284 THOUSANDS/UL (ref 149–390)
PMV BLD AUTO: 10.2 FL (ref 8.9–12.7)
POTASSIUM SERPL-SCNC: 4 MMOL/L (ref 3.5–5.3)
PROT SERPL-MCNC: 6.3 G/DL (ref 6.4–8.4)
PROT UR-MCNC: 8.1 MG/DL
PROT/CREAT UR: 0.1 MG/G{CREAT} (ref 0–0.1)
RBC # BLD AUTO: 3.97 MILLION/UL (ref 3.81–5.12)
SODIUM SERPL-SCNC: 135 MMOL/L (ref 135–147)
TREPONEMA PALLIDUM IGG+IGM AB [PRESENCE] IN SERUM OR PLASMA BY IMMUNOASSAY: NORMAL
WBC # BLD AUTO: 8.92 THOUSAND/UL (ref 4.31–10.16)

## 2024-12-10 PROCEDURE — 36415 COLL VENOUS BLD VENIPUNCTURE: CPT

## 2024-12-10 PROCEDURE — 80053 COMPREHEN METABOLIC PANEL: CPT

## 2024-12-10 PROCEDURE — 82570 ASSAY OF URINE CREATININE: CPT

## 2024-12-10 PROCEDURE — 84156 ASSAY OF PROTEIN URINE: CPT

## 2024-12-10 PROCEDURE — 85027 COMPLETE CBC AUTOMATED: CPT

## 2024-12-10 PROCEDURE — 82950 GLUCOSE TEST: CPT

## 2024-12-10 PROCEDURE — 86780 TREPONEMA PALLIDUM: CPT

## 2024-12-13 NOTE — PROGRESS NOTES
OB/GYN  PN Visit  Dilcia Kilgore  [id number]  2024  3:40 PM  Yana Easton PA-C    S: 29 y.o.  26w4d here for PN visit. Pregnancy complicated by GHTN, chiari malformation, h/o RLTCS.     OB complaints:  Denies c/o n/v/ha, no edema, no smoking, no DV.   No vb/lof  No cramping/ctxns or signs of PTL.    She reports that overall she is feeling well.  Good FKCs.   Pt notes she has felt better.   Will plan to get BP cuff for home monitoring. Will bring to office to compare at next visit.     O:    Pre-Aiden Vitals    Flowsheet Row Most Recent Value   Prenatal Assessment    Fetal Heart Rate 140   Fundal Height (cm) 26 cm   Movement Present   Prenatal Vitals    Blood Pressure 130/74   Weight - Scale 103 kg (228 lb)   Urine Albumin/Glucose    Dilation/Effacement/Station    Vaginal Drainage    Draining Fluid No   Edema    LLE Edema None   RLE Edema None   Facial Edema None            Gen: no acute distress, nonlabored breathing.  OB exam completed: fundal height, +FHT.  Urine: -/-    A/P:    1. 26 weeks gestation of pregnancy  Assessment & Plan:  Overview:     Labs normal 3rd trimester labs  Pap smear:  WNL     GC/CT: 24 negative   Prenatal Panel: normal   Blood Type: A+ RhoGam not indicated   GBS: plan at 36 wks    Genetic Testing: AFP normal, NIPS neg      Vaccines:  Tdap: will review at 27 weeks  Flu: declines, reviewed recommendations     Birth Plan: h/o LTCS, plans TOLAC  Yellow packet to given and consents to be signed at 30 .   Feeding Plan: breast   Breast pump: has info from her insurance to order  Pediatrician: established   Contraception: planning vasectomy   Ultrasounds: 24 normal fetal anatomy plans growth at 32 weeks  2. Other obesity due to excess calories affecting pregnancy in second trimester  3. History of  delivery, antepartum  Assessment & Plan:  Plans TOLAC                RTC in 2 weeks    Yana Easton PA-C  2024  3:40 PM

## 2024-12-16 ENCOUNTER — ROUTINE PRENATAL (OUTPATIENT)
Dept: OBGYN CLINIC | Facility: CLINIC | Age: 29
End: 2024-12-16
Payer: COMMERCIAL

## 2024-12-16 VITALS — SYSTOLIC BLOOD PRESSURE: 130 MMHG | BODY MASS INDEX: 37.94 KG/M2 | DIASTOLIC BLOOD PRESSURE: 74 MMHG | WEIGHT: 228 LBS

## 2024-12-16 DIAGNOSIS — O34.219 HISTORY OF CESAREAN DELIVERY, ANTEPARTUM: ICD-10-CM

## 2024-12-16 DIAGNOSIS — E66.09 OTHER OBESITY DUE TO EXCESS CALORIES AFFECTING PREGNANCY IN SECOND TRIMESTER: ICD-10-CM

## 2024-12-16 DIAGNOSIS — Z3A.26 26 WEEKS GESTATION OF PREGNANCY: Primary | ICD-10-CM

## 2024-12-16 DIAGNOSIS — O99.212 OTHER OBESITY DUE TO EXCESS CALORIES AFFECTING PREGNANCY IN SECOND TRIMESTER: ICD-10-CM

## 2024-12-16 PROCEDURE — 99212 OFFICE O/P EST SF 10 MIN: CPT | Performed by: PHYSICIAN ASSISTANT

## 2024-12-16 NOTE — ASSESSMENT & PLAN NOTE
Overview:     Labs normal 3rd trimester labs  Pap smear: 09/24 WNL     GC/CT: 09/12/24 negative   Prenatal Panel: normal   Blood Type: A+ RhoGam not indicated   GBS: plan at 36 wks    Genetic Testing: AFP normal, NIPS neg      Vaccines:  Tdap: will review at 27 weeks  Flu: declines, reviewed recommendations     Birth Plan: h/o LTCS, plans TOLAC  Yellow packet to given and consents to be signed at 30 .   Feeding Plan: breast   Breast pump: has info from her insurance to order  Pediatrician: established   Contraception: planning vasectomy   Ultrasounds: 11/5/24 normal fetal anatomy plans growth at 32 weeks

## 2024-12-30 ENCOUNTER — ROUTINE PRENATAL (OUTPATIENT)
Dept: OBGYN CLINIC | Facility: CLINIC | Age: 29
End: 2024-12-30
Payer: COMMERCIAL

## 2024-12-30 VITALS
SYSTOLIC BLOOD PRESSURE: 134 MMHG | DIASTOLIC BLOOD PRESSURE: 60 MMHG | BODY MASS INDEX: 38.49 KG/M2 | WEIGHT: 231 LBS | HEIGHT: 65 IN

## 2024-12-30 DIAGNOSIS — Z34.93 PRENATAL CARE IN THIRD TRIMESTER: Primary | ICD-10-CM

## 2024-12-30 PROBLEM — Z3A.28 28 WEEKS GESTATION OF PREGNANCY: Status: ACTIVE | Noted: 2024-09-12

## 2024-12-30 PROCEDURE — 99213 OFFICE O/P EST LOW 20 MIN: CPT | Performed by: STUDENT IN AN ORGANIZED HEALTH CARE EDUCATION/TRAINING PROGRAM

## 2024-12-30 NOTE — ASSESSMENT & PLAN NOTE
Dilcia presents today for her OB visit.  She is currently 28w4d.    Vitals:    12/30/24 0800   BP: 134/60       The patient has completed her 28 week labs and they have been reviewed.  Patient's blood type is A +   I have given the patient the third trimester OB packet with instructions to review and discuss it with her partner, complete it and bring it with her to the labor and delivery.    I have recommended the Tdap vaccine as discussed at her 24 week visit- she will get it at next OV       Warning signs in pregnancy:    I have reviewed the warning signs of pregnancy in the third trimester and advised patient to notify provider immediately if she experiences any of the following:  vaginal bleeding, baby moving less than normal or not at all, or abdominal pain.    Contraceptive options were reviewed, including hormonal methods, both combination (pill, patch, vaginal ring) and progesterone-only (pill, Depo Provera and Nexplanon), intrauterine devices (Mirena, Kasandra and Paragard), barrier methods (condoms, diaphragm) and male/female sterilization.  I have reviewed the breastfeeding friendly contraceptive options also.  The mechanisms, risks, benefits and side effects of all methods were discussed.  All questions have been answered to her satisfaction.     4 = No assist / stand by assistance

## 2024-12-30 NOTE — PROGRESS NOTES
Assessment/Plan  Problem List Items Addressed This Visit       Prenatal care in third trimester - Primary      Dilcia presents today for her OB visit.  She is currently 28w4d.    Vitals:    24 0800   BP: 134/60       The patient has completed her 28 week labs and they have been reviewed.  Patient's blood type is A +   I have given the patient the third trimester OB packet with instructions to review and discuss it with her partner, complete it and bring it with her to the labor and delivery.    I have recommended the Tdap vaccine as discussed at her 24 week visit- she will get it at next OV       Warning signs in pregnancy:    I have reviewed the warning signs of pregnancy in the third trimester and advised patient to notify provider immediately if she experiences any of the following:  vaginal bleeding, baby moving less than normal or not at all, or abdominal pain.    Contraceptive options were reviewed, including hormonal methods, both combination (pill, patch, vaginal ring) and progesterone-only (pill, Depo Provera and Nexplanon), intrauterine devices (Mirena, Kasandra and Paragard), barrier methods (condoms, diaphragm) and male/female sterilization.  I have reviewed the breastfeeding friendly contraceptive options also.  The mechanisms, risks, benefits and side effects of all methods were discussed.  All questions have been answered to her satisfaction.              Subjective    Dilcia is a 29 y.o. female,  with an Estimated Date of Delivery: 3/20/25 with a current gestational age of 28w4d. Patient reports no complaints. Fetal movement: active.     History  The following portions of the patient's history were reviewed and updated as appropriate: allergies, current medications, past family history, past medical history, past social history, past surgical history and problem list.    Objective  Vitals:    24 0800   BP: 134/60     FHT: 148  FH: 28  Urine: neg/neg

## 2025-01-13 PROBLEM — Z3A.30 30 WEEKS GESTATION OF PREGNANCY: Status: ACTIVE | Noted: 2024-09-12

## 2025-01-13 NOTE — PROGRESS NOTES
OB/GYN  PN Visit  Dilcia Kilgore  [id number]  2025  2:57 PM  Yana Easton PA-C    S: 29 y.o.  30w4d here for PN visit. Pregnancy complicated by h/o GTHN.     OB complaints:  Denies c/o n/v/ha, no edema, no smoking, no DV.   No vb/lof  No cramping/ctxns or signs of PTL.    She reports that overall she feels well. She notes some cramping after doing her daily dogs walks. Notes the discomfort has resolved by morningtime.    O:    Pre-Aiden Vitals    Flowsheet Row Most Recent Value   Prenatal Assessment    Fetal Heart Rate 120   Fundal Height (cm) 32 cm   Movement Present   Prenatal Vitals    Blood Pressure 132/78   Weight - Scale 109 kg (240 lb)   Urine Albumin/Glucose    Dilation/Effacement/Station    Vaginal Drainage    Draining Fluid No   Edema    LLE Edema None   RLE Edema None   Facial Edema None            Gen: no acute distress, nonlabored breathing.  OB exam completed: fundal height, +FHT.  Urine: -/-    A/P:    1. 30 weeks gestation of pregnancy  Assessment & Plan:  Overview:     Labs UTD  Pap smear:      GC/CT:  neg   Prenatal Panel: normal   Blood Type: A+ RhoGam not indicated   GBS: plan at 36 weeks    Genetic Testing: neg NIPS and AFP    Vaccines:  Tdap: received today (25)  Flu: declines     Birth Plan: TOLAC  Yellow packet given and consents signed previously.   Feeding Plan: breast feeding  Breast pump: has link through insurance  Pediatrician: established   Contraception: declines   Ultrasounds: plans growth scan later this week w MFM.   2. Prenatal care in third trimester  3. Encounter for immunization  -     Tdap Vaccine greater than or equal to 8yo              RTC in 2 weeks    Yana Easton PA-C  2025  2:57 PM

## 2025-01-14 ENCOUNTER — ROUTINE PRENATAL (OUTPATIENT)
Dept: OBGYN CLINIC | Facility: CLINIC | Age: 30
End: 2025-01-14
Payer: COMMERCIAL

## 2025-01-14 ENCOUNTER — TELEPHONE (OUTPATIENT)
Age: 30
End: 2025-01-14

## 2025-01-14 VITALS
WEIGHT: 240 LBS | DIASTOLIC BLOOD PRESSURE: 78 MMHG | BODY MASS INDEX: 39.99 KG/M2 | HEIGHT: 65 IN | SYSTOLIC BLOOD PRESSURE: 132 MMHG

## 2025-01-14 DIAGNOSIS — Z23 ENCOUNTER FOR IMMUNIZATION: ICD-10-CM

## 2025-01-14 DIAGNOSIS — Z34.93 PRENATAL CARE IN THIRD TRIMESTER: ICD-10-CM

## 2025-01-14 DIAGNOSIS — Z3A.30 30 WEEKS GESTATION OF PREGNANCY: Primary | ICD-10-CM

## 2025-01-14 PROBLEM — Z3A.36 36 WEEKS GESTATION OF PREGNANCY: Status: ACTIVE | Noted: 2025-01-14

## 2025-01-14 PROCEDURE — 90715 TDAP VACCINE 7 YRS/> IM: CPT | Performed by: PHYSICIAN ASSISTANT

## 2025-01-14 PROCEDURE — 90471 IMMUNIZATION ADMIN: CPT | Performed by: PHYSICIAN ASSISTANT

## 2025-01-14 PROCEDURE — 99212 OFFICE O/P EST SF 10 MIN: CPT | Performed by: PHYSICIAN ASSISTANT

## 2025-01-14 RX ORDER — MAGNESIUM OXIDE 400 MG/1
TABLET ORAL
COMMUNITY
Start: 2025-01-11

## 2025-01-14 NOTE — TELEPHONE ENCOUNTER
Patient rescheduled some ob appointments on my chart and it looks like she has some in the same week . She rescheduled 3/13 to 2/13 and she has one on 2/11.  Can you please double check if you want one of them canceled that week and then she would need one week of 3/10.   Thank you

## 2025-01-14 NOTE — ASSESSMENT & PLAN NOTE
Overview:     Labs UTD  Pap smear: 09/24     GC/CT: 09/24 neg   Prenatal Panel: normal   Blood Type: A+ RhoGam not indicated   GBS: plan at 36 weeks    Genetic Testing: neg NIPS and AFP    Vaccines:  Tdap: received today (1/14/25)  Flu: declines     Birth Plan: TOLAC  Yellow packet given and consents signed previously.   Feeding Plan: breast feeding  Breast pump: has link through insurance  Pediatrician: established   Contraception: declines   Ultrasounds: plans growth scan later this week w MFM.

## 2025-01-17 ENCOUNTER — ULTRASOUND (OUTPATIENT)
Dept: PERINATAL CARE | Facility: OTHER | Age: 30
End: 2025-01-17
Payer: COMMERCIAL

## 2025-01-17 VITALS
SYSTOLIC BLOOD PRESSURE: 114 MMHG | BODY MASS INDEX: 39.05 KG/M2 | HEART RATE: 89 BPM | HEIGHT: 65 IN | WEIGHT: 234.4 LBS | DIASTOLIC BLOOD PRESSURE: 68 MMHG

## 2025-01-17 DIAGNOSIS — Z36.89 ENCOUNTER FOR ULTRASOUND TO ASSESS FETAL GROWTH: Primary | ICD-10-CM

## 2025-01-17 DIAGNOSIS — O99.213 OBESITY AFFECTING PREGNANCY IN THIRD TRIMESTER, UNSPECIFIED OBESITY TYPE: ICD-10-CM

## 2025-01-17 DIAGNOSIS — Z3A.36 36 WEEKS GESTATION OF PREGNANCY: ICD-10-CM

## 2025-01-17 PROCEDURE — 99213 OFFICE O/P EST LOW 20 MIN: CPT | Performed by: OBSTETRICS & GYNECOLOGY

## 2025-01-17 PROCEDURE — 76816 OB US FOLLOW-UP PER FETUS: CPT | Performed by: OBSTETRICS & GYNECOLOGY

## 2025-01-17 NOTE — PROGRESS NOTES
"[facility]: Dilcia Kilgore was seen today for fetal growth assessment ultrasound.  See ultrasound report under \"OB Procedures\" tab.   The time spent on this established patient on the encounter date included 5 minutes previsit service time reviewing records and precharting, 10 minutes face-to-face service time counseling regarding results and coordinating care, and  5 minutes charting, totalling 20 minutes.  Please don't hesitate to contact our office with any concerns or questions.  -Kalpana Hutchins MD      "

## 2025-01-27 NOTE — PROGRESS NOTES
OB/GYN  PN Visit  Dilcia Kilgore  [id number]  2025  3:35 PM  JOSE D Odell    S: 29 y.o.  32w5d here for PN visit. Her pregnancy is complicated by prior PIH, hx of LTCS, BMI 39, and chiari malformation.    She denies contractions. She denies leakage of fluid and vaginal bleeding.   She reports good fetal movement.   She denies nausea, vomiting, headache, cramping, edema, and smoking.   Patient feels safe at home.       O:  Pre-Aiden Vitals      Flowsheet Row Most Recent Value   Prenatal Assessment    Fetal Heart Rate 145   Fundal Height (cm) 33 cm   Movement Present   Prenatal Vitals    Blood Pressure 130/72   Weight - Scale 107 kg (235 lb)   Urine Albumin/Glucose    Dilation/Effacement/Station    Vaginal Drainage    Edema           Nd=156 kg (235 lb); Body mass index is 39.11 kg/m².; TWG=6.804 kg (15 lb)    General: Well appearing, no distress.  OB exam completed: fundal height, +FHT.  Urine: -/-     A/P:    Problem List Items Addressed This Visit       Chiari I malformation (HCC) - Primary    Obesity complicating pregnancy in second trimester    BMI 39  Recommendation for weekly NST/CARA at 37 weeks         History of  delivery, antepartum    Plans TOLAC         Prior pregnancy complicated by PIH, antepartum, first trimester    Baseline Pre-e labs completed         Prenatal care in third trimester    - Continue PNV  - Labor precautions reviewed  - Fetal movement reviewed  - Labs: UTD  -Rh: A+  - Genetics: NIP/AFP WNL  - Ultrasounds:  EFW Hadlock 4 1915 grams - 4 lbs 4 oz (73%), Breech  - Tdap: 0  - Flu Shot: declines  - RSV:  Will offer during season (-) @ [id number]   - Rhogam: n/a  - Delivery: plans TOLAC  - Contraception:   - Breastfeeding: yes, has link  - Pediatrician: established  -Delivery Consent & Packet: signed with Dr. Verde at 28 weeks  -GBS: 36 weeks  - RTO in 2 weeks              JOSE D Odell  2025  3:35 PM

## 2025-01-27 NOTE — ASSESSMENT & PLAN NOTE
- Continue PNV  - Labor precautions reviewed  - Fetal movement reviewed  - Labs: UTD  -Rh: A+  - Genetics: NIP/AFP WNL  - Ultrasounds: 1/17 EFW Hadlock 4 1915 grams - 4 lbs 4 oz (73%), Breech  - Tdap: 0/14/25  - Flu Shot: declines  - RSV:  Will offer during season (9/1-1/31) @ [id number]   - Rhogam: n/a  - Delivery: plans TOLAC  - Contraception:   - Breastfeeding: yes, has link  - Pediatrician: established  -Delivery Consent & Packet: signed with Dr. Verde at 28 weeks  -GBS: 36 weeks  - RTO in 2 weeks    
BMI 39  Recommendation for weekly NST/CARA at 37 weeks  
Baseline Pre-e labs completed  
Plans TOLAC  
09-Jan-2018 17:15

## 2025-01-28 ENCOUNTER — ROUTINE PRENATAL (OUTPATIENT)
Dept: OBGYN CLINIC | Facility: CLINIC | Age: 30
End: 2025-01-28
Payer: COMMERCIAL

## 2025-01-28 VITALS
WEIGHT: 235 LBS | BODY MASS INDEX: 39.15 KG/M2 | HEIGHT: 65 IN | DIASTOLIC BLOOD PRESSURE: 72 MMHG | SYSTOLIC BLOOD PRESSURE: 130 MMHG

## 2025-01-28 DIAGNOSIS — G93.5 CHIARI I MALFORMATION (HCC): Primary | ICD-10-CM

## 2025-01-28 DIAGNOSIS — O09.891 PRIOR PREGNANCY COMPLICATED BY PIH, ANTEPARTUM, FIRST TRIMESTER: ICD-10-CM

## 2025-01-28 DIAGNOSIS — O99.212 OTHER OBESITY DUE TO EXCESS CALORIES AFFECTING PREGNANCY IN SECOND TRIMESTER: ICD-10-CM

## 2025-01-28 DIAGNOSIS — Z34.93 PRENATAL CARE IN THIRD TRIMESTER: ICD-10-CM

## 2025-01-28 DIAGNOSIS — O34.219 HISTORY OF CESAREAN DELIVERY, ANTEPARTUM: ICD-10-CM

## 2025-01-28 DIAGNOSIS — E66.09 OTHER OBESITY DUE TO EXCESS CALORIES AFFECTING PREGNANCY IN SECOND TRIMESTER: ICD-10-CM

## 2025-01-28 PROCEDURE — 99213 OFFICE O/P EST LOW 20 MIN: CPT

## 2025-02-07 ENCOUNTER — TELEPHONE (OUTPATIENT)
Dept: PERINATAL CARE | Facility: OTHER | Age: 30
End: 2025-02-07

## 2025-02-07 NOTE — TELEPHONE ENCOUNTER
Left patient a message that her 3/6 MFM appointment had to be rescheduled to 10:15 AM. The new time, date and location were provided.  The patient has been instructed to please call us back at [phone] with any questions or concerns.

## 2025-02-11 ENCOUNTER — ROUTINE PRENATAL (OUTPATIENT)
Dept: OBGYN CLINIC | Facility: CLINIC | Age: 30
End: 2025-02-11
Payer: COMMERCIAL

## 2025-02-11 VITALS
BODY MASS INDEX: 39.32 KG/M2 | WEIGHT: 236 LBS | SYSTOLIC BLOOD PRESSURE: 120 MMHG | HEIGHT: 65 IN | DIASTOLIC BLOOD PRESSURE: 82 MMHG

## 2025-02-11 DIAGNOSIS — Z34.93 PRENATAL CARE IN THIRD TRIMESTER: Primary | ICD-10-CM

## 2025-02-11 PROCEDURE — 99213 OFFICE O/P EST LOW 20 MIN: CPT | Performed by: OBSTETRICS & GYNECOLOGY

## 2025-02-11 NOTE — ASSESSMENT & PLAN NOTE
- Continue PNV  - Labor precautions reviewed  - Fetal movement reviewed  - Labs: UTD  -Rh: A+  - Genetics: NIP/AFP WNL  - Ultrasounds: 1/17 EFW Hadlock 4 1915 grams - 4 lbs 4 oz (73%), Breech  - Tdap: 0/14/25  - Flu Shot: declines  - Rhogam: n/a  - Delivery: plans TOLAC  - Contraception: planning vasectomy  - Breastfeeding: yes, has link  - Pediatrician: established  -Delivery Consent & Packet: signed with Dr. Verde at 28 weeks  -GBS: 36 weeks  - RTO in 2 weeks

## 2025-02-11 NOTE — PROGRESS NOTES
Patient reports good fm, no n/v,  cramping, bleeding, loss of fluid, edema, dom violence, or smoking.  yajaira pnv headache easily resolved by Tylenol has started a PFS would like to do some NSTs here encourage patient to discuss with scheduling.  Assessment & Plan  Prenatal care in third trimester  - Continue PNV  - Labor precautions reviewed  - Fetal movement reviewed  - Labs: UTD  -Rh: A+  - Genetics: NIP/AFP WNL  - Ultrasounds: 1/17 EFW Hadlock 4 1915 grams - 4 lbs 4 oz (73%), Breech  - Tdap: 0/14/25  - Flu Shot: declines  - Rhogam: n/a  - Delivery: plans TOLAC  - Contraception: planning vasectomy  - Breastfeeding: yes, has link  - Pediatrician: established  -Delivery Consent & Packet: signed with Dr. Verde at 28 weeks  -GBS: 36 weeks  - RTO in 2 weeks

## 2025-02-18 NOTE — PROGRESS NOTES
Dilcia is a 29 y.o.  35w5d. Reports ++FM, no LOF, VB, or regular contractions.     Vitals:    25 0900   BP: 118/84     S=D  +FHTs  Assessment & Plan  Prenatal care in third trimester  - Continue PNV  - Labor precautions reviewed  - Fetal movement reviewed  - Labs: UTD  - Rh: A+  - Genetics: NIP/AFP WNL  - Ultrasounds:  EFW Hadlock 4 1915 grams - 4 lbs 4 oz (73%), Breech   Growth at 36 weeks recommended and scheduled (25)   Weekly NST/CARA at 37 weeks recommended for BMI and scheduled  - Tdap:   - Flu Shot: declines  - Rhogam: n/a  - Delivery: planning TOLAC, awaiting growth scan if still malposition however if still larger, leaning towards repeat CS, will call to review/request if/when ready  - Contraception: planning vasectomy, not sure if would like sterilization  - Breastfeeding: yes, has pump  - Pediatrician: established  - Delivery Consent & Packet: signed with Dr. Verde at 28 weeks  - GBS: 36 weeks, will do next week  - RTO in 2 weeks       35 weeks gestation of pregnancy         History of  delivery, antepartum                English

## 2025-02-18 NOTE — ASSESSMENT & PLAN NOTE
- Continue PNV  - Labor precautions reviewed  - Fetal movement reviewed  - Labs: UTD  - Rh: A+  - Genetics: NIP/AFP WNL  - Ultrasounds: 1/17 EFW Hadlock 4 1915 grams - 4 lbs 4 oz (73%), Breech   Growth at 36 weeks recommended and scheduled (2/21/25)   Weekly NST/CARA at 37 weeks recommended for BMI and scheduled  - Tdap: 0/14/25  - Flu Shot: declines  - Rhogam: n/a  - Delivery: planning TOLAC, awaiting growth scan if still malposition however if still larger, leaning towards repeat CS, will call to review/request if/when ready  - Contraception: planning vasectomy, not sure if would like sterilization  - Breastfeeding: yes, has pump  - Pediatrician: established  - Delivery Consent & Packet: signed with Dr. Verde at 28 weeks  - GBS: 36 weeks, will do next week  - RTO in 2 weeks

## 2025-02-19 ENCOUNTER — ROUTINE PRENATAL (OUTPATIENT)
Dept: OBGYN CLINIC | Facility: CLINIC | Age: 30
End: 2025-02-19
Payer: COMMERCIAL

## 2025-02-19 VITALS
DIASTOLIC BLOOD PRESSURE: 84 MMHG | BODY MASS INDEX: 39.82 KG/M2 | SYSTOLIC BLOOD PRESSURE: 118 MMHG | WEIGHT: 239 LBS | HEIGHT: 65 IN

## 2025-02-19 DIAGNOSIS — O34.219 HISTORY OF CESAREAN DELIVERY, ANTEPARTUM: ICD-10-CM

## 2025-02-19 DIAGNOSIS — Z3A.35 35 WEEKS GESTATION OF PREGNANCY: ICD-10-CM

## 2025-02-19 DIAGNOSIS — Z34.93 PRENATAL CARE IN THIRD TRIMESTER: Primary | ICD-10-CM

## 2025-02-19 PROCEDURE — 99213 OFFICE O/P EST LOW 20 MIN: CPT | Performed by: STUDENT IN AN ORGANIZED HEALTH CARE EDUCATION/TRAINING PROGRAM

## 2025-02-20 PROBLEM — E66.01 SEVERE OBESITY DUE TO EXCESS CALORIES AFFECTING PREGNANCY IN SECOND TRIMESTER (HCC): Status: ACTIVE | Noted: 2024-09-05

## 2025-02-20 PROBLEM — O99.213 SEVERE OBESITY DUE TO EXCESS CALORIES AFFECTING PREGNANCY IN THIRD TRIMESTER (HCC): Status: ACTIVE | Noted: 2024-09-05

## 2025-02-21 ENCOUNTER — ULTRASOUND (OUTPATIENT)
Dept: PERINATAL CARE | Facility: OTHER | Age: 30
End: 2025-02-21
Payer: COMMERCIAL

## 2025-02-21 ENCOUNTER — TELEPHONE (OUTPATIENT)
Age: 30
End: 2025-02-21

## 2025-02-21 VITALS
DIASTOLIC BLOOD PRESSURE: 56 MMHG | WEIGHT: 239 LBS | HEIGHT: 65 IN | BODY MASS INDEX: 39.82 KG/M2 | HEART RATE: 98 BPM | SYSTOLIC BLOOD PRESSURE: 124 MMHG

## 2025-02-21 DIAGNOSIS — O99.213 SEVERE OBESITY DUE TO EXCESS CALORIES AFFECTING PREGNANCY IN THIRD TRIMESTER (HCC): ICD-10-CM

## 2025-02-21 DIAGNOSIS — Z3A.36 36 WEEKS GESTATION OF PREGNANCY: Primary | ICD-10-CM

## 2025-02-21 DIAGNOSIS — Z36.89 ENCOUNTER FOR ULTRASOUND TO ASSESS FETAL GROWTH: ICD-10-CM

## 2025-02-21 DIAGNOSIS — E66.01 SEVERE OBESITY DUE TO EXCESS CALORIES AFFECTING PREGNANCY IN THIRD TRIMESTER (HCC): ICD-10-CM

## 2025-02-21 DIAGNOSIS — O09.891 PRIOR PREGNANCY COMPLICATED BY PIH, ANTEPARTUM, FIRST TRIMESTER: ICD-10-CM

## 2025-02-21 PROCEDURE — 99213 OFFICE O/P EST LOW 20 MIN: CPT | Performed by: NURSE PRACTITIONER

## 2025-02-21 PROCEDURE — 59025 FETAL NON-STRESS TEST: CPT | Performed by: NURSE PRACTITIONER

## 2025-02-21 PROCEDURE — 76816 OB US FOLLOW-UP PER FETUS: CPT | Performed by: OBSTETRICS & GYNECOLOGY

## 2025-02-21 NOTE — LETTER
NST sleeve cover sheet    Patient name: Dilcia Kilgore  : 1995  MRN: [id number]    FREDY: Estimated Date of Delivery: 3/20/25    Obstetrician: [facility]    Reason(s) for testing: BMI cl II    Testing frequency:    ___  2x/wk  _x_  1x/wk  ___  Dopplers  ___  BPP?      Last growth scan: __________, _________, _________    Baby:      Male   /   Female   /   [place]             Baby Name: ___________________            IOL or  C/S: _____________________

## 2025-02-21 NOTE — TELEPHONE ENCOUNTER
Placed call to patient, patient reports completed appt w/MFM today and fetal presentation is breech. Pt desires to schedule RLTCS at this time @ 39wks. Reviewed MFM documentation to confirm recommendations at this time. Patient has prenatal appt in office 25 and will further review with provider at that time, prefers to proceed with scheduling 39wk repeat  at this time and aware if any changes in recommendations can modify scheduling as appropriate.   Patient prefers to scheduled 3/13/25 w/Dr. Regalado if available. If not, is open to next available. Patient states can confirm scheduling via [employer] message or return call to review with pt. Pt had no additional questions at this time.

## 2025-02-21 NOTE — PROGRESS NOTES
"[facility]: Ms. Kilgore was seen today at 36w1d gestational age for NST (found under the pregnancy episode) which I reviewed the RN assessment and agree, and fetal growth ultrasound (see ultrasound report under OB procedures tab).  See ultrasound report under \"OB Procedures\" tab.    Libia JEFFERY    "

## 2025-02-21 NOTE — TELEPHONE ENCOUNTER
RLTCS scheduled 3/13/25 at 10am with Dr. Regalado, scheduled w/Aaliyah N @ L&D via ESC.   Patient called and made aware/confirmed of scheduling details. Patient aware hospital will call day prior to confirm arrival time and pre procedure instructions. Patient verbalized understanding to maintain prenatal appts in office as scheduled. Patient had no additional questions at this time. Calendar and chart updated, staff message sent to provider.

## 2025-02-21 NOTE — PROGRESS NOTES
Non-Stress Testing:    Non-Stress test, equipment, procedure, and expected outcomes explained. Reviewed fetal kick counts and when to call OB.Verified patient understanding of fetal kick counts with teach back method. Patient reports feeling daily fetal movements. Patient has no questions or concerns.     Reviewed non-stress test with JOSE D Sutherland in-person

## 2025-02-25 ENCOUNTER — ULTRASOUND (OUTPATIENT)
Dept: PERINATAL CARE | Facility: OTHER | Age: 30
End: 2025-02-25
Payer: COMMERCIAL

## 2025-02-25 VITALS
DIASTOLIC BLOOD PRESSURE: 64 MMHG | HEART RATE: 95 BPM | WEIGHT: 236.2 LBS | BODY MASS INDEX: 39.35 KG/M2 | HEIGHT: 65 IN | SYSTOLIC BLOOD PRESSURE: 128 MMHG

## 2025-02-25 DIAGNOSIS — E66.01 SEVERE OBESITY DUE TO EXCESS CALORIES AFFECTING PREGNANCY IN THIRD TRIMESTER (HCC): Primary | ICD-10-CM

## 2025-02-25 DIAGNOSIS — Z3A.36 36 WEEKS GESTATION OF PREGNANCY: ICD-10-CM

## 2025-02-25 DIAGNOSIS — O99.213 SEVERE OBESITY DUE TO EXCESS CALORIES AFFECTING PREGNANCY IN THIRD TRIMESTER (HCC): Primary | ICD-10-CM

## 2025-02-25 PROCEDURE — 76815 OB US LIMITED FETUS(S): CPT | Performed by: OBSTETRICS & GYNECOLOGY

## 2025-02-25 PROCEDURE — 59025 FETAL NON-STRESS TEST: CPT | Performed by: OBSTETRICS & GYNECOLOGY

## 2025-02-25 NOTE — PROGRESS NOTES
Repeat Non-Stress Testing:    Patient verbalizes +FM. Pt denies ALL:               Leaking of fluid   Contractions   Vaginal bleeding   Decreased fetal movement    Patient is performing daily kick counts. Patient has no questions or concerns.   NST strip reviewed by Dr. Lentz in-person.    
The patient was seen today for an ultrasound and NST.  Please see ultrasound report (located under OB Procedures tab) for additional details.  
show

## 2025-02-26 ENCOUNTER — ROUTINE PRENATAL (OUTPATIENT)
Dept: OBGYN CLINIC | Facility: CLINIC | Age: 30
End: 2025-02-26
Payer: COMMERCIAL

## 2025-02-26 VITALS
HEIGHT: 65 IN | WEIGHT: 235 LBS | DIASTOLIC BLOOD PRESSURE: 84 MMHG | SYSTOLIC BLOOD PRESSURE: 132 MMHG | BODY MASS INDEX: 39.15 KG/M2

## 2025-02-26 DIAGNOSIS — Z34.93 PRENATAL CARE IN THIRD TRIMESTER: Primary | ICD-10-CM

## 2025-02-26 PROCEDURE — 87150 DNA/RNA AMPLIFIED PROBE: CPT | Performed by: STUDENT IN AN ORGANIZED HEALTH CARE EDUCATION/TRAINING PROGRAM

## 2025-02-26 PROCEDURE — 99213 OFFICE O/P EST LOW 20 MIN: CPT | Performed by: STUDENT IN AN ORGANIZED HEALTH CARE EDUCATION/TRAINING PROGRAM

## 2025-02-26 NOTE — PROGRESS NOTES
Assessment/Plan  1. Prenatal care in third trimester  Overview:  - Continue PNV  - Labor precautions reviewed  - Fetal movement reviewed  - Labs: UTD  - Rh: A+  - Genetics: NIP/AFP WNL  - Ultrasounds:            Weekly N/ST/CARA at 37 weeks recommended for BMI and scheduled  - Tdap:  EFW 87%, breech  - Flu Shot: declines  - Rhogam: n/a  - Delivery: CD 3/13 Regalado   - Contraception: planning vasectomy  - Breastfeeding: yes, has pump  - Pediatrician: established  - Delivery Consent & Packet: signed with Dr. Verde at 28 weeks  - GBS: collected   - RTO in 1 weeks  Assessment & Plan:    Dilcia presents today for her 36 week visit. She is currently 36w6d  Vitals:    25 0800   BP: 132/84         A GBS culture was collected at this visit.    Labor: I have reviewed the signs and symptoms of labor with the patient, including contractions q4-5 minutes for greater than 1 hour, vaginal bleeding, leaking fluid and decreased fetal movement.  I have emphasized the continued importance of paying close attention to the baby's movements.  I have instructed the patient to call the office with any of the above symptoms.        Orders:  -     Strep B DNA probe, amplification      Subjective    Dilcia is a 29 y.o. female,  with an Estimated Date of Delivery: 3/20/25 with a current gestational age of 36w6d. Patient reports no complaints. Fetal movement: active.     History  The following portions of the patient's history were reviewed and updated as appropriate: allergies, current medications, past family history, past medical history, past social history, past surgical history and problem list.        Objective  Vitals:    25 0800   BP: 132/84     FHT: 120  FH: 36

## 2025-02-26 NOTE — ASSESSMENT & PLAN NOTE
Dilcia presents today for her 36 week visit. She is currently 36w6d  Vitals:    02/26/25 0800   BP: 132/84         A GBS culture was collected at this visit.    Labor: I have reviewed the signs and symptoms of labor with the patient, including contractions q4-5 minutes for greater than 1 hour, vaginal bleeding, leaking fluid and decreased fetal movement.  I have emphasized the continued importance of paying close attention to the baby's movements.  I have instructed the patient to call the office with any of the above symptoms.

## 2025-02-28 ENCOUNTER — RESULTS FOLLOW-UP (OUTPATIENT)
Dept: OBGYN CLINIC | Facility: CLINIC | Age: 30
End: 2025-02-28

## 2025-02-28 LAB — GP B STREP DNA SPEC QL NAA+PROBE: NEGATIVE

## 2025-03-03 NOTE — PATIENT INSTRUCTIONS
Kick Counts in Pregnancy   AMBULATORY CARE:   Kick counts  measure how much your baby is moving in your womb. A kick from your baby can be felt as a twist, turn, swish, roll, or jab. It is common to feel your baby kicking at 26 to 28 weeks of pregnancy. You may feel your baby kick as early as 20 weeks of pregnancy. You may want to start counting at 28 weeks.   Contact your doctor immediately if:   You feel a change in the number of kicks or movements of your baby.      You feel fewer than 10 kicks within 2 hours.      You have questions or concerns about your baby's movements.     Why measure kick counts:  Your baby's movement may provide information about your baby's health. He or she may move less, or not at all, if there are problems. Your baby may move less if he or she is not getting enough oxygen or nutrition from the placenta. Do not smoke while you are pregnant. Smoking decreases the amount of oxygen that gets to your baby. Talk to your healthcare provider if you need help to quit smoking. Tell your healthcare provider as soon as you feel a change in your baby's movements.  When to measure kick counts:   Measure kick counts at the same time every day.       Measure kick counts when your baby is awake and most active. Your baby may be most active in the evening.     How to measure kick counts:  Check that your baby is awake before you measure kick counts. You can wake up your baby by lightly pushing on your belly, walking, or drinking something cold. Your healthcare provider may tell you different ways to measure kick counts. You may be told to do the following:  Use a chart or clock to keep track of the time you start and finish counting.      Sit in a chair or lie on your left side.      Place your hands on the largest part of your belly.      Count until you reach 10 kicks. Write down how much time it takes to count 10 kicks.      It may take 30 minutes to 2 hours to count 10 kicks. It should not take more  than 2 hours to count 10 kicks.     Follow up with your doctor as directed:  Write down your questions so you remember to ask them during your visits.   ©  [employer]2023 Information is for End User's use only and may not be sold, redistributed or otherwise used for commercial purposes.  The above information is an  only. It is not intended as medical advice for individual conditions or treatments. Talk to your doctor, nurse or pharmacist before following any medical regimen to see if it is safe and effective for you. Thank you for choosing us for your  care today.  If you have any questions about your ultrasound or care, please do not hesitate to contact us or your primary obstetrician.        Some general instructions for your pregnancy are:    Exercise: Aim for 150 minutes per week of regular exercise.  Walking is great!  Nutrition: Choose healthy sources of calcium, iron, and protein.  Avoid ultraprocessed foods and added sugar.  Learn about Preeclampsia: preeclampsia is a common, potentially serious high blood pressure complication in pregnancy.  A blood pressure of 140mmHg (systolic or top number) or 90mmHg (diastolic or bottom number) should be evaluated by your doctor.  Aspirin is sometimes prescribed in early pregnancy to prevent preeclampsia in women with risk factors - ask your obstetrician if you should be on this medication.  For more resources, visit:  https://www.highriskpregnancyinfo.org/preeclampsia  If you smoke, please try to quit completely but also try to reduce your smoking by as much as possible (as soon as possible).  Do not vape.  Please also avoid cannabis products.  Other warning signs to watch out for in pregnancy or postpartum: chest pain, obstructed breathing or shortness of breath, seizures, thoughts of hurting yourself or your baby, bleeding, a painful or swollen leg, fever, or headache (see AWHONN POST-BIRTH Warning Signs campaign).  If these happen  call 911.  Itching is also not normal in pregnancy and if you experience this, especially over your hands and feet, potentially worse at night, notify your doctors.     Thank you for choosing us for your  care today.  If you have any questions about your ultrasound or care, please do not hesitate to contact us or your primary obstetrician.        Some general instructions for your pregnancy are:    Exercise: Aim for 22 minutes per day (150 minutes per week) of regular exercise.  Walking is great!  Nutrition: Choose healthy sources of calcium, iron, and protein.  Learn about Preeclampsia: preeclampsia is a common, potentially serious high blood pressure complication in pregnancy.  A blood pressure of 140mmHg (systolic or top number) or 90mmHg (diastolic or bottom number) should be evaluated by your doctor.  Aspirin is sometimes prescribed in early pregnancy to prevent preeclampsia in women with risk factors - ask your obstetrician if you should be on this medication.  For more resources, visit:  https://www.highriskpregnancyinfo.org/preeclampsia  If you smoke, please try to quit completely but also try to reduce your smoking by as much as possible (as soon as possible).  Do not vape.  Please also avoid cannabis products.  Other warning signs to watch out for in pregnancy or postpartum: chest pain, obstructed breathing or shortness of breath, seizures, thoughts of hurting yourself or your baby, bleeding, a painful or swollen leg, fever, or headache (see AWHONN POST-BIRTH Warning Signs campaign).  If these happen call 911.  Itching is also not normal in pregnancy and if you experience this, especially over your hands and feet, potentially worse at night, notify your doctors.

## 2025-03-04 ENCOUNTER — ULTRASOUND (OUTPATIENT)
Dept: PERINATAL CARE | Facility: OTHER | Age: 30
End: 2025-03-04
Payer: COMMERCIAL

## 2025-03-04 VITALS
SYSTOLIC BLOOD PRESSURE: 116 MMHG | HEART RATE: 99 BPM | DIASTOLIC BLOOD PRESSURE: 54 MMHG | BODY MASS INDEX: 39.39 KG/M2 | WEIGHT: 236.4 LBS | HEIGHT: 65 IN

## 2025-03-04 DIAGNOSIS — Z3A.37 37 WEEKS GESTATION OF PREGNANCY: ICD-10-CM

## 2025-03-04 DIAGNOSIS — O99.213 SEVERE OBESITY DUE TO EXCESS CALORIES AFFECTING PREGNANCY IN THIRD TRIMESTER (HCC): Primary | ICD-10-CM

## 2025-03-04 DIAGNOSIS — E66.01 SEVERE OBESITY DUE TO EXCESS CALORIES AFFECTING PREGNANCY IN THIRD TRIMESTER (HCC): Primary | ICD-10-CM

## 2025-03-04 PROCEDURE — 59025 FETAL NON-STRESS TEST: CPT | Performed by: NURSE PRACTITIONER

## 2025-03-04 PROCEDURE — 76815 OB US LIMITED FETUS(S): CPT | Performed by: NURSE PRACTITIONER

## 2025-03-04 PROCEDURE — 99213 OFFICE O/P EST LOW 20 MIN: CPT | Performed by: NURSE PRACTITIONER

## 2025-03-04 NOTE — PROGRESS NOTES
"[facility]: Ms. Kilgore was seen today at 37w5d gestational age for NST (found under the pregnancy episode) which I reviewed the RN assessment and agree, and CARA (see ultrasound report under OB procedures tab).  See ultrasound report under \"OB Procedures\" tab.    Libia JEFFERY    I spent 10 minutes devoted to patient care (3 min chart preparation, 4 minutes face to face and 3 minutes documenting).    "

## 2025-03-04 NOTE — PROGRESS NOTES
Repeat Non-Stress Testing:    Patient verbalizes +FM. Pt denies ALL:               Leaking of fluid   Contractions   Vaginal bleeding   Decreased fetal movement    Patient is performing daily kick counts. Patient has no questions or concerns.   NST strip reviewed by JOSE D Sutherland in-person.

## 2025-03-05 PROBLEM — Z3A.38 38 WEEKS GESTATION OF PREGNANCY: Status: ACTIVE | Noted: 2025-01-14

## 2025-03-05 PROBLEM — Z3A.38 38 WEEKS GESTATION OF PREGNANCY: Status: RESOLVED | Noted: 2025-01-14 | Resolved: 2025-03-05

## 2025-03-05 NOTE — ASSESSMENT & PLAN NOTE
- Continue PNV  - Labor precautions reviewed  - Fetal kick counts reviewed  - Labs: UTD  -Pap: 9/2024 NILM  -Rh: A+  - Genetics: NIPS low risk, neg AFP  - Ultrasounds: L2 normal, growth scan normal - breech presentation 2/21/2025  - Tdap: 1/14/2025  - Flu Shot: declines  - Rhogam: N/A  - Delivery: RTLCS 3/13/25 - breech presentation  - Contraception: declines -  possible vasectomy   - Breastfeeding: breast pump ordered  - Pediatrician: established  -Delivery Consent & Packet:   -GBS: negative  - RTO in 1 weeks

## 2025-03-05 NOTE — PROGRESS NOTES
OB/GYN  PN Visit  Dilcia Kilgore  [id number]  3/10/2025  11:45 AM  Yana Easton PA-C    S: 29 y.o.  38w4dd here for PN visit. Pregnancy complicated by h/o migraines, chiari malformation, obesity, previous , h/o gHTN .     OB complaints:  Denies c/o n/v/ha, no edema, no smoking, no DV.   No vb/lof  No cramping/ctxns or signs of PTL.    She reports that she is feeling well. Has repeat  planned for Thursday. Questions answered and instruction booklet given. Pt has wash already.   She does note that when getting up to come back to room as she got up she has a nerve pain shoot down her leg. She notes her BP was mildly elevated and feels this may have contributed to it.   BP in recheck normal  (126/60). Pt denies any visual changes, headaches, RUQ pain or edema.     O:    Pre- Vitals    Flowsheet Row Most Recent Value   Prenatal Assessment    Fetal Heart Rate 124   Fundal Height (cm) 40 cm   Movement Present   Presentation Breech   Prenatal Vitals    Blood Pressure 126/60   Weight - Scale 108 kg (237 lb)   Urine Albumin/Glucose    Dilation/Effacement/Station    Vaginal Drainage    Draining Fluid No   Edema    LLE Edema None   RLE Edema None   Facial Edema None            Gen: no acute distress, nonlabored breathing.  OB exam completed: fundal height, +FHT.  Urine: -/-    A/P:    1. Severe obesity due to excess calories affecting pregnancy in third trimester (HCC)  2. 38 weeks gestation of pregnancy  3. Prenatal care in third trimester  Overview:  - Continue PNV  - Labor precautions reviewed  - Fetal movement reviewed  - Labs: UTD  - Rh: A+  - Genetics: NIP/AFP WNL  - Ultrasounds:            Weekly N/ST/CARA at 37 weeks recommended for BMI and scheduled  - Tdap: 0 EFW 87%, breech  - Flu Shot: declines  - Rhogam: n/a  - Delivery: CD 3/13 Regalado   - Contraception: planning vasectomy  - Breastfeeding: yes, has pump  - Pediatrician: established  - Delivery Consent & Packet: signed  with Dr. Verde at 28 weeks  - GBS: negative  - RTO pp          RTC-PP    Yana Easton PA-C  3/10/2025  11:45 AM

## 2025-03-05 NOTE — PROGRESS NOTES
OB/GYN  PN Visit  Dilcia Kilgore  [id number]  3/7/2025  2:18 PM  CarolineJOSE D Regalado    S: 29 y.o.  38w1d here for PN visit.   She denies contractions. She denies leakage of fluid and vaginal bleeding.   She endorses good fetal movement.   She denies nausea, vomiting, headache, cramping, edema, and smoking.   Her pregnancy is complicated by history of C/S, BMI of 39, chiari malformation, breech presentation,  and history of gestational hypertension.   She reports feeling well.     O:  Pre- Vitals      Flowsheet Row Most Recent Value   Prenatal Assessment    Fetal Heart Rate 130   Fundal Height (cm) 38 cm   Movement Present   Prenatal Vitals    Blood Pressure 122/72   Weight - Scale 108 kg (238 lb 9.6 oz)   Urine Albumin/Glucose    Dilation/Effacement/Station    Vaginal Drainage    Edema           Cj=081 kg (238 lb 9.6 oz); Body mass index is 39.71 kg/m².; TWG=8.437 kg (18 lb 9.6 oz)  Physical Exam    General: Well appearing, no distress  Respiratory: Unlabored breathing  Abdomen: Soft, gravid, nontender  Fundal Height: Appropriate for gestational age.   Extremities: Warm and well perfused.  Non tender.  OB exam completed: fundal height, +FHT.  Urine: -/-     A/P:    Problem List Items Addressed This Visit       Chiari I malformation (HCC)    Severe obesity due to excess calories affecting pregnancy in third trimester (HCC)    BMI 39  Recommendation for weekly NST/CARA at 37 weeks         38 weeks gestation of pregnancy - Primary    - Continue PNV  - Labor precautions reviewed  - Fetal kick counts reviewed  - Labs: UTD  -Pap: 2024 NILM  -Rh: A+  - Genetics: NIPS low risk, neg AFP  - Ultrasounds: L2 normal, growth scan normal - breech presentation 2025  - Tdap: 2025  - Flu Shot: declines  - Rhogam: N/A  - Delivery: RTLCS 3/13/25 - breech presentation  - Contraception: declines -  possible vasectomy   - Breastfeeding: breast pump ordered  - Pediatrician:  established  -Delivery Consent & Packet:   -GBS: negative  - RTO in 1 weeks           #2 - continue weekly NST/CARA    #2- Hibiclens and surgery pamphlet with instructions reviewed for c/s scheduled 3/13/2025.     JOSE D Alegria  3/7/2025  2:18 PM

## 2025-03-07 ENCOUNTER — ROUTINE PRENATAL (OUTPATIENT)
Dept: OBGYN CLINIC | Facility: CLINIC | Age: 30
End: 2025-03-07
Payer: COMMERCIAL

## 2025-03-07 VITALS — SYSTOLIC BLOOD PRESSURE: 122 MMHG | BODY MASS INDEX: 39.71 KG/M2 | WEIGHT: 238.6 LBS | DIASTOLIC BLOOD PRESSURE: 72 MMHG

## 2025-03-07 DIAGNOSIS — E66.01 SEVERE OBESITY DUE TO EXCESS CALORIES AFFECTING PREGNANCY IN THIRD TRIMESTER (HCC): ICD-10-CM

## 2025-03-07 DIAGNOSIS — G93.5 CHIARI I MALFORMATION (HCC): ICD-10-CM

## 2025-03-07 DIAGNOSIS — Z3A.38 38 WEEKS GESTATION OF PREGNANCY: Primary | ICD-10-CM

## 2025-03-07 DIAGNOSIS — O99.213 SEVERE OBESITY DUE TO EXCESS CALORIES AFFECTING PREGNANCY IN THIRD TRIMESTER (HCC): ICD-10-CM

## 2025-03-07 PROCEDURE — 99213 OFFICE O/P EST LOW 20 MIN: CPT

## 2025-03-10 ENCOUNTER — ROUTINE PRENATAL (OUTPATIENT)
Dept: OBGYN CLINIC | Facility: CLINIC | Age: 30
End: 2025-03-10
Payer: COMMERCIAL

## 2025-03-10 ENCOUNTER — TELEPHONE (OUTPATIENT)
Dept: OBGYN CLINIC | Facility: CLINIC | Age: 30
End: 2025-03-10

## 2025-03-10 VITALS — BODY MASS INDEX: 39.44 KG/M2 | WEIGHT: 237 LBS | DIASTOLIC BLOOD PRESSURE: 60 MMHG | SYSTOLIC BLOOD PRESSURE: 126 MMHG

## 2025-03-10 DIAGNOSIS — E66.01 SEVERE OBESITY DUE TO EXCESS CALORIES AFFECTING PREGNANCY IN THIRD TRIMESTER (HCC): Primary | ICD-10-CM

## 2025-03-10 DIAGNOSIS — O99.213 SEVERE OBESITY DUE TO EXCESS CALORIES AFFECTING PREGNANCY IN THIRD TRIMESTER (HCC): Primary | ICD-10-CM

## 2025-03-10 DIAGNOSIS — Z34.93 PRENATAL CARE IN THIRD TRIMESTER: ICD-10-CM

## 2025-03-10 DIAGNOSIS — Z3A.38 38 WEEKS GESTATION OF PREGNANCY: ICD-10-CM

## 2025-03-10 PROCEDURE — 99212 OFFICE O/P EST SF 10 MIN: CPT | Performed by: PHYSICIAN ASSISTANT

## 2025-03-10 NOTE — TELEPHONE ENCOUNTER
Received: Today  Huyen Merchant MD  P Ob Navigator [facility]; Kelin Regalado MD  Can you please call Dilcia and let her know that her  scheduled this Thursday will need to be moved to the following day at 1PM given scheduling changes for a high risk indicated patient- please confirm with L&D  once reviewed.    Thank you!  Huyen Merchant MD  [facility]

## 2025-03-10 NOTE — TELEPHONE ENCOUNTER
Placed call to patient and reviewed unfortunately need to bump/reschedule  to 3/14/25 at 1pm, with Dr. Merchant. Patient verbalized understanding, aware will receive phone call from hospital day prior to confirm arrival time and review pre procedure instructions. Patient had no additional questions at this time and aware to call office if any questions/concerns prior to scheduled delivery date. Placed call to L&D and confirmed with Aaliyah N. C/s scheduled 3/14/25 at 1pm with Dr. Merchant. Staff message sent to provider and chart updated.

## 2025-03-11 ENCOUNTER — TELEPHONE (OUTPATIENT)
Dept: PERINATAL CARE | Facility: OTHER | Age: 30
End: 2025-03-11

## 2025-03-11 NOTE — TELEPHONE ENCOUNTER
Patient cancel her appt for 03/11/25 at the [place] location Spoke with patient in regards to scheduling Maternal Fetal Medicine appointment(s) for nst/marv . Stated Maternal Fetal Medicine has 6 office locations offering multiple types of appointments for patient's access to care.     Patient was offered the following appointment dates/ time/ location:   [place] 03/12/25 at 9:15 am  [place] 03/13/25 at 8:45  Patient declined the appointments offered.     Telephone encounter routed to OBGYN and Maternal Fetal Medicine clinical coordinator.

## 2025-03-12 NOTE — PRE-PROCEDURE INSTRUCTIONS
Phone call to patient for pre-operative instructions prior to .   Pt was instructed to arrive @ 1100 am for 1 pm csection      Pt instructed to remain NPO after midnight for solids, can have clears until 0500 am              *This includes gum, water and hard candy.    Pt should brush their teeth as usual.     Pt was instructed to buy and use a pre-surgical wash containing chlorhexidine the night before and the morning of her scheduled  and to wear clean clothing after the wash.  Pt instructed not to shave operative area prior to surgery.     Pt was asked not to wear any jewelry and to leave all of her valuables at home.     Pt was asked to leave all of her larger bags and suitcases in the car to be brought in after she is assigned a post partum room.  Pt should bring in any paperwork she was given in the office.     Pt was informed that she may have 1 support person in the OR/PACU area and of the current visiting policies.  Support person should eat prior to the surgery.

## 2025-03-13 ENCOUNTER — HOSPITAL ENCOUNTER (INPATIENT)
Facility: HOSPITAL | Age: 30
LOS: 2 days | Discharge: HOME | End: 2025-03-15
Attending: STUDENT IN AN ORGANIZED HEALTH CARE EDUCATION/TRAINING PROGRAM | Admitting: STUDENT IN AN ORGANIZED HEALTH CARE EDUCATION/TRAINING PROGRAM
Payer: COMMERCIAL

## 2025-03-13 ENCOUNTER — ANESTHESIA (INPATIENT)
Dept: LABOR AND DELIVERY | Facility: HOSPITAL | Age: 30
End: 2025-03-13
Payer: COMMERCIAL

## 2025-03-13 ENCOUNTER — VBI (OUTPATIENT)
Dept: ADMINISTRATIVE | Facility: OTHER | Age: 30
End: 2025-03-13

## 2025-03-13 ENCOUNTER — ANESTHESIA EVENT (INPATIENT)
Dept: LABOR AND DELIVERY | Facility: HOSPITAL | Age: 30
End: 2025-03-13
Payer: COMMERCIAL

## 2025-03-13 DIAGNOSIS — Z98.891 STATUS POST REPEAT LOW TRANSVERSE CESAREAN SECTION: ICD-10-CM

## 2025-03-13 DIAGNOSIS — O34.219 HISTORY OF CESAREAN DELIVERY, ANTEPARTUM: ICD-10-CM

## 2025-03-13 DIAGNOSIS — O34.219 PREVIOUS CESAREAN SECTION COMPLICATING PREGNANCY, WITH DELIVERY: ICD-10-CM

## 2025-03-13 DIAGNOSIS — O09.891 PRIOR PREGNANCY COMPLICATED BY PIH, ANTEPARTUM, FIRST TRIMESTER: ICD-10-CM

## 2025-03-13 PROBLEM — Z3A.39 39 WEEKS GESTATION OF PREGNANCY: Status: ACTIVE | Noted: 2025-01-14

## 2025-03-13 LAB
ABO GROUP BLD: NORMAL
BASE EXCESS BLDCOA CALC-SCNC: -4.4 MMOL/L (ref 3–11)
BASE EXCESS BLDCOV CALC-SCNC: -2.2 MMOL/L (ref 1–9)
BLD GP AB SCN SERPL QL: NEGATIVE
ERYTHROCYTE [DISTWIDTH] IN BLOOD BY AUTOMATED COUNT: 14.1 % (ref 11.6–15.1)
HCO3 BLDCOA-SCNC: 23.6 MMOL/L (ref 17.3–27.3)
HCO3 BLDCOV-SCNC: 22.7 MMOL/L (ref 12.2–28.6)
HCT VFR BLD AUTO: 32 % (ref 34.8–46.1)
HGB BLD-MCNC: 10.2 G/DL (ref 11.5–15.4)
HOLD SPECIMEN: NORMAL
MCH RBC QN AUTO: 24.3 PG (ref 26.8–34.3)
MCHC RBC AUTO-ENTMCNC: 31.9 G/DL (ref 31.4–37.4)
MCV RBC AUTO: 76 FL (ref 82–98)
O2 CT VFR BLDCOA CALC: 6.1 ML/DL
OXYHGB MFR BLDCOA: 30.4 %
OXYHGB MFR BLDCOV: 84 %
PCO2 BLDCOA: 55 MM[HG] (ref 30–60)
PCO2 BLDCOV: 39.7 MM HG (ref 27–43)
PH BLDCOA: 7.25 [PH] (ref 7.23–7.43)
PH BLDCOV: 7.38 [PH] (ref 7.19–7.49)
PLATELET # BLD AUTO: 270 THOUSANDS/UL (ref 149–390)
PMV BLD AUTO: 10.4 FL (ref 8.9–12.7)
PO2 BLDCOA: 16 MM HG (ref 5–25)
PO2 BLDCOV: 38.1 MM HG (ref 15–45)
RBC # BLD AUTO: 4.19 MILLION/UL (ref 3.81–5.12)
RH BLD: POSITIVE
SAO2 % BLDCOV: 17.2 ML/DL
SPECIMEN EXPIRATION DATE: NORMAL
WBC # BLD AUTO: 10.74 THOUSAND/UL (ref 4.31–10.16)

## 2025-03-13 PROCEDURE — 86900 BLOOD TYPING SEROLOGIC ABO: CPT

## 2025-03-13 PROCEDURE — 59514 CESAREAN DELIVERY ONLY: CPT | Performed by: STUDENT IN AN ORGANIZED HEALTH CARE EDUCATION/TRAINING PROGRAM

## 2025-03-13 PROCEDURE — 4A1HXCZ MONITORING OF PRODUCTS OF CONCEPTION, CARDIAC RATE, EXTERNAL APPROACH: ICD-10-PCS | Performed by: STUDENT IN AN ORGANIZED HEALTH CARE EDUCATION/TRAINING PROGRAM

## 2025-03-13 PROCEDURE — 86780 TREPONEMA PALLIDUM: CPT

## 2025-03-13 PROCEDURE — 82805 BLOOD GASES W/O2 SATURATION: CPT | Performed by: STUDENT IN AN ORGANIZED HEALTH CARE EDUCATION/TRAINING PROGRAM

## 2025-03-13 PROCEDURE — 86901 BLOOD TYPING SEROLOGIC RH(D): CPT

## 2025-03-13 PROCEDURE — 85027 COMPLETE CBC AUTOMATED: CPT

## 2025-03-13 PROCEDURE — 86850 RBC ANTIBODY SCREEN: CPT

## 2025-03-13 PROCEDURE — NC001 PR NO CHARGE: Performed by: STUDENT IN AN ORGANIZED HEALTH CARE EDUCATION/TRAINING PROGRAM

## 2025-03-13 RX ORDER — KETOROLAC TROMETHAMINE 30 MG/ML
15 INJECTION, SOLUTION INTRAMUSCULAR; INTRAVENOUS EVERY 6 HOURS
Status: DISCONTINUED | OUTPATIENT
Start: 2025-03-14 | End: 2025-03-13

## 2025-03-13 RX ORDER — OXYCODONE HYDROCHLORIDE 10 MG/1
10 TABLET ORAL EVERY 4 HOURS PRN
Status: DISCONTINUED | OUTPATIENT
Start: 2025-03-13 | End: 2025-03-15 | Stop reason: HOSPADM

## 2025-03-13 RX ORDER — SODIUM CHLORIDE, SODIUM LACTATE, POTASSIUM CHLORIDE, CALCIUM CHLORIDE 600; 310; 30; 20 MG/100ML; MG/100ML; MG/100ML; MG/100ML
125 INJECTION, SOLUTION INTRAVENOUS CONTINUOUS
Status: DISCONTINUED | OUTPATIENT
Start: 2025-03-13 | End: 2025-03-15

## 2025-03-13 RX ORDER — KETOROLAC TROMETHAMINE 30 MG/ML
30 INJECTION, SOLUTION INTRAMUSCULAR; INTRAVENOUS EVERY 6 HOURS
Status: DISCONTINUED | OUTPATIENT
Start: 2025-03-13 | End: 2025-03-14

## 2025-03-13 RX ORDER — KETOROLAC TROMETHAMINE 30 MG/ML
INJECTION, SOLUTION INTRAMUSCULAR; INTRAVENOUS AS NEEDED
Status: DISCONTINUED | OUTPATIENT
Start: 2025-03-13 | End: 2025-03-13

## 2025-03-13 RX ORDER — GLYCOPYRROLATE 0.2 MG/ML
INJECTION INTRAMUSCULAR; INTRAVENOUS AS NEEDED
Status: DISCONTINUED | OUTPATIENT
Start: 2025-03-13 | End: 2025-03-13

## 2025-03-13 RX ORDER — OXYCODONE HYDROCHLORIDE 5 MG/1
5 TABLET ORAL EVERY 4 HOURS PRN
Status: DISCONTINUED | OUTPATIENT
Start: 2025-03-13 | End: 2025-03-15 | Stop reason: HOSPADM

## 2025-03-13 RX ORDER — DIPHENHYDRAMINE HYDROCHLORIDE 50 MG/ML
INJECTION INTRAMUSCULAR; INTRAVENOUS AS NEEDED
Status: DISCONTINUED | OUTPATIENT
Start: 2025-03-13 | End: 2025-03-13

## 2025-03-13 RX ORDER — NALBUPHINE HYDROCHLORIDE 10 MG/ML
5 INJECTION INTRAMUSCULAR; INTRAVENOUS; SUBCUTANEOUS
Status: ACTIVE | OUTPATIENT
Start: 2025-03-13 | End: 2025-03-14

## 2025-03-13 RX ORDER — FENTANYL CITRATE 50 UG/ML
INJECTION, SOLUTION INTRAMUSCULAR; INTRAVENOUS AS NEEDED
Status: DISCONTINUED | OUTPATIENT
Start: 2025-03-13 | End: 2025-03-13

## 2025-03-13 RX ORDER — BENZOCAINE/MENTHOL 6 MG-10 MG
1 LOZENGE MUCOUS MEMBRANE DAILY PRN
Status: DISCONTINUED | OUTPATIENT
Start: 2025-03-13 | End: 2025-03-15 | Stop reason: HOSPADM

## 2025-03-13 RX ORDER — BUPIVACAINE HYDROCHLORIDE 7.5 MG/ML
INJECTION, SOLUTION INTRASPINAL AS NEEDED
Status: DISCONTINUED | OUTPATIENT
Start: 2025-03-13 | End: 2025-03-13

## 2025-03-13 RX ORDER — DIPHENHYDRAMINE HYDROCHLORIDE 50 MG/ML
25 INJECTION, SOLUTION INTRAMUSCULAR; INTRAVENOUS EVERY 6 HOURS PRN
Status: DISCONTINUED | OUTPATIENT
Start: 2025-03-13 | End: 2025-03-15 | Stop reason: HOSPADM

## 2025-03-13 RX ORDER — CEFAZOLIN SODIUM 2 G/50ML
2000 SOLUTION INTRAVENOUS ONCE
Status: COMPLETED | OUTPATIENT
Start: 2025-03-13 | End: 2025-03-13

## 2025-03-13 RX ORDER — IBUPROFEN 600 MG/1
600 TABLET, FILM COATED ORAL EVERY 6 HOURS
Status: DISCONTINUED | OUTPATIENT
Start: 2025-03-15 | End: 2025-03-14

## 2025-03-13 RX ORDER — IBUPROFEN 600 MG/1
600 TABLET, FILM COATED ORAL EVERY 6 HOURS
Status: DISCONTINUED | OUTPATIENT
Start: 2025-03-15 | End: 2025-03-13

## 2025-03-13 RX ORDER — KETOROLAC TROMETHAMINE 30 MG/ML
15 INJECTION, SOLUTION INTRAMUSCULAR; INTRAVENOUS EVERY 6 HOURS
Status: DISCONTINUED | OUTPATIENT
Start: 2025-03-14 | End: 2025-03-14

## 2025-03-13 RX ORDER — ONDANSETRON 2 MG/ML
4 INJECTION INTRAMUSCULAR; INTRAVENOUS EVERY 6 HOURS PRN
Status: ACTIVE | OUTPATIENT
Start: 2025-03-13 | End: 2025-03-14

## 2025-03-13 RX ORDER — SIMETHICONE 80 MG
80 TABLET,CHEWABLE ORAL 4 TIMES DAILY PRN
Status: DISCONTINUED | OUTPATIENT
Start: 2025-03-13 | End: 2025-03-15 | Stop reason: HOSPADM

## 2025-03-13 RX ORDER — ONDANSETRON 2 MG/ML
4 INJECTION INTRAMUSCULAR; INTRAVENOUS EVERY 8 HOURS PRN
Status: DISCONTINUED | OUTPATIENT
Start: 2025-03-13 | End: 2025-03-13

## 2025-03-13 RX ORDER — OXYTOCIN/RINGER'S LACTATE 30/500 ML
PLASTIC BAG, INJECTION (ML) INTRAVENOUS CONTINUOUS PRN
Status: DISCONTINUED | OUTPATIENT
Start: 2025-03-13 | End: 2025-03-13

## 2025-03-13 RX ORDER — ONDANSETRON 2 MG/ML
INJECTION INTRAMUSCULAR; INTRAVENOUS AS NEEDED
Status: DISCONTINUED | OUTPATIENT
Start: 2025-03-13 | End: 2025-03-13

## 2025-03-13 RX ORDER — CALCIUM CARBONATE 500 MG/1
1000 TABLET, CHEWABLE ORAL 3 TIMES DAILY PRN
Status: DISCONTINUED | OUTPATIENT
Start: 2025-03-13 | End: 2025-03-15 | Stop reason: HOSPADM

## 2025-03-13 RX ORDER — ACETAMINOPHEN 325 MG/1
650 TABLET ORAL EVERY 6 HOURS SCHEDULED
Status: DISCONTINUED | OUTPATIENT
Start: 2025-03-13 | End: 2025-03-14

## 2025-03-13 RX ORDER — FENTANYL CITRATE 50 UG/ML
50 INJECTION, SOLUTION INTRAMUSCULAR; INTRAVENOUS ONCE
Status: COMPLETED | OUTPATIENT
Start: 2025-03-13 | End: 2025-03-13

## 2025-03-13 RX ORDER — OXYTOCIN/RINGER'S LACTATE 30/500 ML
62.5 PLASTIC BAG, INJECTION (ML) INTRAVENOUS ONCE
Status: COMPLETED | OUTPATIENT
Start: 2025-03-13 | End: 2025-03-14

## 2025-03-13 RX ORDER — NALOXONE HYDROCHLORIDE 0.4 MG/ML
0.1 INJECTION, SOLUTION INTRAMUSCULAR; INTRAVENOUS; SUBCUTANEOUS
Status: DISCONTINUED | OUTPATIENT
Start: 2025-03-13 | End: 2025-03-14

## 2025-03-13 RX ORDER — MORPHINE SULFATE 0.5 MG/ML
INJECTION, SOLUTION EPIDURAL; INTRATHECAL; INTRAVENOUS AS NEEDED
Status: DISCONTINUED | OUTPATIENT
Start: 2025-03-13 | End: 2025-03-13

## 2025-03-13 RX ORDER — EPHEDRINE SULFATE 50 MG/ML
INJECTION INTRAVENOUS AS NEEDED
Status: DISCONTINUED | OUTPATIENT
Start: 2025-03-13 | End: 2025-03-13

## 2025-03-13 RX ORDER — ENOXAPARIN SODIUM 100 MG/ML
40 INJECTION SUBCUTANEOUS DAILY
Status: DISCONTINUED | OUTPATIENT
Start: 2025-03-14 | End: 2025-03-15 | Stop reason: HOSPADM

## 2025-03-13 RX ADMIN — EPHEDRINE SULFATE 10 MG: 50 INJECTION INTRAVENOUS at 13:20

## 2025-03-13 RX ADMIN — ONDANSETRON 4 MG: 2 INJECTION INTRAMUSCULAR; INTRAVENOUS at 13:16

## 2025-03-13 RX ADMIN — PHENYLEPHRINE HYDROCHLORIDE 40 MCG/MIN: 50 INJECTION INTRAVENOUS at 13:08

## 2025-03-13 RX ADMIN — KETOROLAC TROMETHAMINE 30 MG: 30 INJECTION, SOLUTION INTRAMUSCULAR; INTRAVENOUS at 20:47

## 2025-03-13 RX ADMIN — DIPHENHYDRAMINE HYDROCHLORIDE 12.5 MG: 50 INJECTION, SOLUTION INTRAMUSCULAR; INTRAVENOUS at 14:17

## 2025-03-13 RX ADMIN — OXYTOCIN 62.5 MILLI-UNITS/MIN: 10 INJECTION INTRAVENOUS at 15:28

## 2025-03-13 RX ADMIN — FENTANYL CITRATE 15 MCG: 50 INJECTION INTRAMUSCULAR; INTRAVENOUS at 13:14

## 2025-03-13 RX ADMIN — Medication 999 MILLI-UNITS/MIN: at 13:50

## 2025-03-13 RX ADMIN — SODIUM CHLORIDE, SODIUM LACTATE, POTASSIUM CHLORIDE, AND CALCIUM CHLORIDE 1000 ML: .6; .31; .03; .02 INJECTION, SOLUTION INTRAVENOUS at 11:33

## 2025-03-13 RX ADMIN — SODIUM CHLORIDE, SODIUM LACTATE, POTASSIUM CHLORIDE, AND CALCIUM CHLORIDE 125 ML/HR: .6; .31; .03; .02 INJECTION, SOLUTION INTRAVENOUS at 15:07

## 2025-03-13 RX ADMIN — FENTANYL CITRATE 50 MCG: 50 INJECTION INTRAMUSCULAR; INTRAVENOUS at 16:33

## 2025-03-13 RX ADMIN — GLYCOPYRROLATE 0.1 MG: 0.2 INJECTION INTRAMUSCULAR; INTRAVENOUS at 13:20

## 2025-03-13 RX ADMIN — SODIUM CHLORIDE, SODIUM LACTATE, POTASSIUM CHLORIDE, AND CALCIUM CHLORIDE 125 ML/HR: .6; .31; .03; .02 INJECTION, SOLUTION INTRAVENOUS at 12:10

## 2025-03-13 RX ADMIN — KETOROLAC TROMETHAMINE 30 MG: 30 INJECTION, SOLUTION INTRAMUSCULAR; INTRAVENOUS at 14:19

## 2025-03-13 RX ADMIN — SODIUM CHLORIDE, SODIUM LACTATE, POTASSIUM CHLORIDE, AND CALCIUM CHLORIDE: .6; .31; .03; .02 INJECTION, SOLUTION INTRAVENOUS at 13:50

## 2025-03-13 RX ADMIN — BUPIVACAINE HYDROCHLORIDE IN DEXTROSE 1.6 ML: 7.5 INJECTION, SOLUTION SUBARACHNOID at 13:14

## 2025-03-13 RX ADMIN — ACETAMINOPHEN 650 MG: 325 TABLET, FILM COATED ORAL at 20:47

## 2025-03-13 RX ADMIN — CEFAZOLIN SODIUM 2000 MG: 2 SOLUTION INTRAVENOUS at 13:17

## 2025-03-13 RX ADMIN — MORPHINE SULFATE 0.15 MG: 0.5 INJECTION, SOLUTION EPIDURAL; INTRATHECAL; INTRAVENOUS at 13:14

## 2025-03-13 NOTE — DISCHARGE SUMMARY
Obstetrics Discharge Summary  Dilcia Kilgore 29 y.o. female MRN: [id number]  Unit/Bed#: LD PACU- Encounter: [id number]    Admission Date: 3/13/2025   Admitting Attending: Kelin Regalado MD    Delivery Method: , Low Transverse   Delivery Date and Time: 3/13/2025 1:49 PM  Delivery Attending: Micheline Regalado MD    Anesthesia: spinal    Discharge Date: 03/15/25   Discharge Attending: Julia Verde MD    Principal Diagnosis: Pregnancy at 39w0d    Secondary Diagnosis, Admission:  Breech presentation of fetus  BMI 39  Prior  delivery  Gestational hypertension in prior pregnancy  Migraines  Unknown seizure disorder    Discharge Diagnosis:  Same, delivered    Procedures: repeat  section, low transverse incision    Hospital course:   Dilcia Kilgore presented as a 29 y.o.  at 39w0d. She presented to labor and delivery for scheduled repeat low transverse  section in the setting of breech presentation of fetus.     On 3/13/25 @1349 she delivered a viable male  @39w0d via repeat  section, low transverse incision. There were no apparent complications. 's birth weight was 9lbs 2oz; Apgars were 8 (1 min) and 9 (5 min).     Her post-delivery course was uncomplicated. Her preoperative hemoglobin was 10.2g/dL, postoperative was 9.3. She received Venofer. Mom's blood type is A positive. RhoGAM was not indicated.    Her postpartum pain was well controlled with oral analgesics. On day of discharge, she was ambulating and able to reasonably perform all ADLs. She was voiding and had appropriate bowel function. She was discharged home on postpartum day #2 without complications. Patient was instructed to follow up with her OBGYN as an outpatient and was given appropriate warnings to call provider if she develops signs of infection or uncontrolled pain.    Complications: none apparent    Contraception: partner vasectomy    Condition at discharge: good      Provisions for Follow-Up Care:  Discharge Medications:   Discharge Instructions:   Please see after visit summary for information related to follow-up care, medications, and any other discharge instructions.    Disposition: Home    Planned Readmission: Sobia Holloawy  PGY-1 OB/GYN  03/15/25  8:38 AM

## 2025-03-13 NOTE — ASSESSMENT & PLAN NOTE
QBL: 294cc; admission Hb 10.2g/dL -> 9.3, Venofer ordered. Patient progressing toward postoperative milestones  - Continue routine postoperative care  - Pain management with oral analgesics  - DVT Ppx: Lovenox 40mg BID POD1; encourage ambulation  - Passed void trial  - Encourage breastfeeding, familial bonding

## 2025-03-13 NOTE — ANESTHESIA POSTPROCEDURE EVALUATION
Post-Op Assessment Note    CV Status:  Stable    Pain management: adequate       Mental Status:  Alert and awake   Hydration Status:  Euvolemic   PONV Controlled:  Controlled   Airway Patency:  Patent     Post Op Vitals Reviewed: Yes    No anethesia notable event occurred.    Staff: Anesthesiologist, CRNA           Last Filed PACU Vitals:  Vitals Value Taken Time   Temp 97.8 °F (36.6 °C) 03/13/25 1444   Pulse 87 03/13/25 1523   /59 03/13/25 1515   Resp 18 03/13/25 1444   SpO2 98 % 03/13/25 1523   Vitals shown include unfiled device data.    Modified Tamiko:     Vitals Value Taken Time   Activity 1 03/13/25 1444   Respiration 2 03/13/25 1444   Circulation 2 03/13/25 1444   Consciousness 2 03/13/25 1444   Oxygen Saturation 2 03/13/25 1444     Modified Tamiko Score: 9

## 2025-03-13 NOTE — TELEPHONE ENCOUNTER
03/13/25 12:18 PM     Chart reviewed for Pap Smear (HPV) aka Cervical Cancer Screening was/were submitted to the patient's insurance.     Shanna Arshad   [facility]

## 2025-03-13 NOTE — ANESTHESIA PREPROCEDURE EVALUATION
"Procedure:   SECTION () REPEAT (Uterus)     for repeat . She tolerated a labor epidural converted to operative epidural previously.    Hx of Chiari I malformation without hydrocephalus or elevated ICP and chronic intractable migraine, however these are reportedly unrelated. It appears to have been several years since she has seen neurology and the patient was not evaluated by neurology or anesthesia prior to this encounter. Patient informs that her symptoms are at worst mild, without weakness, numbness or imbalance.    Relevant Problems   ANESTHESIA   (+) PONV (postoperative nausea and vomiting)      CARDIO   (+) Intractable chronic migraine without aura and without status migrainosus      GYN   (+) 38 weeks gestation of pregnancy      NEURO/PSYCH   (+) Intractable chronic migraine without aura and without status migrainosus   (+) Persistent headaches     Excerpt from note   \"Patient underwent MRI/MRV which was negative for acute intracranial pathology. She did have an incidental finding of Chiari I malformation which was not suspected to be the cause of her migraine headache.\"       Physical Exam    Airway    Mallampati score: II  TM Distance: >3 FB  Neck ROM: full     Dental   No notable dental hx     Cardiovascular  Rhythm: regular, Rate: normal, Cardiovascular exam normal    Pulmonary  Pulmonary exam normal Breath sounds clear to auscultation    Other Findings  post-pubertal.      Anesthesia Plan  ASA Score- 2     Anesthesia Type- spinal with ASA Monitors.         Additional Monitors:     Airway Plan: ETT.    Comment: Plan for spinal anesthesia after discussion of risk and the literature proven safety of spinal anesthesia in patients with Chiari malformation type 1 without outflow obstruction or ICP.    Discussed the risks/benefits of neuraxial anesthesia, including risk of bleeding/infection/damage to underlying structures, the likely side effect of nausea, and unlikely " complication of spinal headache.  Plan to maintain native airway with EtCO2 monitoring under spinal anesthesia, general anesthesia with OETT as backup.    After a thorough discussion of risk vs benefit vs alternatives, the patient would like us to proceed with spinal anesthesia, understanding there is a small risk of neurologic complication related to her chiari malformation. This decision was made vs epidural or general considering the rare but much more significant risk of inadvertant dural puncture, or the risk of general anesthesia complications in third trimester pregnancy..       Plan Factors-Exercise tolerance (METS): >4 METS.    Chart reviewed.   Existing labs reviewed. Patient summary reviewed.      Patient instructed to abstain from smoking on day of procedure. Patient did not smoke on day of surgery.            Induction-     Postoperative Plan- Plan for postoperative opioid use.     Perioperative Resuscitation Plan - Level 1 - Full Code.       Informed Consent- Anesthetic plan and risks discussed with patient.  I personally reviewed this patient with the CRNA. Discussed and agreed on the Anesthesia Plan with the CRNA..      NPO Status:  No vitals data found for the desired time range.

## 2025-03-13 NOTE — H&P
"H & P- Obstetrics   Dilcia Kilgore 29 y.o. female MRN: [id number]  Unit/Bed#: LD PACU-03 Encounter: [id number]    Assessment: 29 y.o.  at 39w0d admitted for scheduled RLTCS.  SVE: deferred  FHT: reactive  Breech by TAUS  Clinical EFW: 8.5lbs  GBS status: negative    Plan:   39 weeks gestation of pregnancy  Assessment & Plan  PNL wnl  A+  GBS negative   EFW Hadlock 4 3258 grams - 7 lbs 3 oz (87%) at 36w1d    Class 2 obesity due to excess calories without serious comorbidity with body mass index (BMI) of 36.0 to 36.9 in adult  Assessment & Plan  BMI 39  Plan for Lovenox in post-operative period    Prior pregnancy complicated by PIH, antepartum, first trimester  Assessment & Plan  Normotensive this pregnancy  Continue to monitor closely    History of  delivery, antepartum  Assessment & Plan  For RLTCS    * Encounter for  delivery  Assessment & Plan  Admitted for RLTCS  Admission labs - CBC, T&S, syphilis screen  Diet: NPO  Surgical ppx: 2 g Ancef   GBS: negative; prophylaxis not indicated  Anesthesia and NICU teams aware  To OR as planned      Discussed case and plan w/ Dr. Regalado.     Chief Complaint: \"I'm here for my .\"     HPI: Dilcia Kilgore is a 29 y.o.  with an FREDY of 3/20/2025, by Last Menstrual Period at 39w0d who is being admitted for scheduled RLTCS. She denies having uterine contractions, has no LOF, and reports no VB. She states she has felt good FM.    Patient Active Problem List   Diagnosis    Hx of migraines    Persistent headaches    Chiari I malformation (HCC)    Intractable chronic migraine without aura and without status migrainosus    PONV (postoperative nausea and vomiting)    Severe obesity due to excess calories affecting pregnancy in third trimester (HCC)    History of  delivery, antepartum    Prior pregnancy complicated by PIH, antepartum, first trimester    Prenatal care in third trimester    Class 2 obesity due to excess calories " without serious comorbidity with body mass index (BMI) of 36.0 to 36.9 in adult    Fall    39 weeks gestation of pregnancy    Encounter for  delivery     Baby complications/comments: none    Review of Systems   Constitutional:  Negative for chills and fever.   Respiratory:  Negative for cough and shortness of breath.    Gastrointestinal:  Negative for diarrhea, nausea and vomiting.   Genitourinary:  Negative for dysuria and hematuria.   Skin:  Negative for color change and rash.   Neurological:  Negative for dizziness, syncope and headaches.     OB Hx:  OB History    Para Term  AB Living   5 1 1  3 1   SAB IAB Ectopic Multiple Live Births   3   0 1      # Outcome Date GA Lbr Sadiq/2nd Weight Sex Type Anes PTL Lv   5 Current            4 SAB 2023 9w0d             Birth Comments: D&E   3 Term 22 37w3d  2830 g (6 lb 3.8 oz) F CS-LTranv   LAVONNE      Birth Comments: failed induction (elevated BP) - dilated to 5 cm - variable FHR   2 2019 8w0d          1 2017 6w0d              Past Medical Hx:  Past Medical History:   Diagnosis Date    Encounter for induction of labor 2022    Gestational hypertension 2022    Admitted at 32 weeks for prolonged headache. Had at least 2 elevated blood pressures in pregnancy, 1 when she presented for vaginal bleeding and the other when she presented for headache.  Her headache took a few days before resolving. Currently she is on magnesium oxide 400 milligrams daily, riboflavin 400 milligrams daily, Flexeril 10 milligrams 3 times a day and cyproheptadine 4 milligrams 3 ti    Hypertension     gestational HTN - no meds    Migraine     Miscarriage     PONV (postoperative nausea and vomiting)     Seizures (HCC)     last one 4 years ago.    Varicella     vaccinated       Past Surgical hx:  Past Surgical History:   Procedure Laterality Date    UT  DELIVERY ONLY N/A 2022    Procedure:  SECTION ();  Surgeon: Kelin Tobias  MD Fabricio;  Location: [facility];  Service: Obstetrics    DE TX MISSED  FIRST TRIMESTER SURGICAL N/A 2023    Procedure: DILATATION AND EVACUATION (D&E) ( 11 weeks);  Surgeon: Jojo Hilario MD;  Location: [facility];  Service: Gynecology    TONSILLECTOMY      WISDOM TOOTH EXTRACTION         Social Hx:  Alcohol use: denies  Tobacco use: denies  Other substance use: denies    Allergies   Allergen Reactions    Latex Hives and Rash    Adhesive [Medical Tape] Rash     Redness, rash, possible Latex allergy as well. PAPER TAPE ok       Medications Prior to Admission:     magnesium oxide (MAG-OX) 400 mg tablet    Prenatal Vit-Fe Fumarate-FA (PRENATAL VITAMIN PO)    Riboflavin (B-2-400) 400 MG CAPS    Objective:  Temp:  [98.1 °F (36.7 °C)] 98.1 °F (36.7 °C)  HR:  [109] 109  BP: (118)/(74) 118/74  Resp:  [18] 18  SpO2:  [96 %] 96 %  Body mass index is 39.44 kg/m².     Physical Exam:  Physical Exam  Constitutional:       Appearance: Normal appearance.   Genitourinary:      Vulva normal.   Cardiovascular:      Rate and Rhythm: Normal rate.   Pulmonary:      Effort: Pulmonary effort is normal.   Abdominal:      Palpations: Abdomen is soft.   Neurological:      General: No focal deficit present.      Mental Status: She is alert and oriented to person, place, and time.   Skin:     General: Skin is dry.   Psychiatric:         Mood and Affect: Mood normal.        FHT:  Baseline Rate (FHR): 125 bpm  Variability: Moderate  Decelerations: None    TOCO:   Contraction Frequency (minutes): 0  Contraction Intensity: Mild    Lab Results   Component Value Date    WBC 8.92 12/10/2024    HGB 11.1 (L) 12/10/2024    HCT 33.3 (L) 12/10/2024     12/10/2024     Lab Results   Component Value Date    K 4.0 12/10/2024     12/10/2024    CO2 24 12/10/2024    BUN 7 12/10/2024    CREATININE 0.52 (L) 12/10/2024    AST 13 12/10/2024    ALT 9 12/10/2024     Prenatal Labs: Reviewed      Blood type: A+  Antibody: negative  GBS:  negative  HIV: non-reactive  Rubella: immune  Syphilis IgM/IgG: non-reactive  HBsAg: non-reactive  HCAb: non-reactive  Chlamydia: negative  Gonorrhea: negative  Diabetes 1 hour screen: passed  3 hour glucose: n/a  Platelets: 284, pending admission CBC  Hgb: 11.1, pending admission CBC  >2 Midnights  INPATIENT     Signature/Title: Larisa Sage MD  Date: 3/13/2025  Time: 11:36 AM

## 2025-03-13 NOTE — ANESTHESIA PROCEDURE NOTES
Spinal Block    Patient location during procedure: OB/L&D  Start time: 3/13/2025 1:14 PM  Reason for block: at surgeon's request and primary anesthetic  Staffing  Performed by: Robert Morales CRNA  Authorized by: Juanpablo Mazariegos MD    Preanesthetic Checklist  Completed: patient identified, IV checked, site marked, risks and benefits discussed, surgical consent, monitors and equipment checked, pre-op evaluation and timeout performed  Spinal Block  Patient position: sitting  Prep: ChloraPrep  Patient monitoring: cardiac monitor, continuous pulse ox, frequent blood pressure checks and heart rate  Approach: midline  Location: L3-4  Needle  Needle type: Pencan   Needle gauge: 25 G  Needle length: 3.5 in  Assessment  Sensory level: T4  Injection Assessment:  positive aspiration for clear CSF, no paresthesia on injection and negative aspiration for heme.  Post-procedure:  site cleaned  Additional Notes  Spinal placed with pt in the sitting position in the OR. Pt tolerated procedure well, one attempt. Clear CSF aspirated pre and post injection of spinal medication. Level checked and is at the adequate level for the surgical procedure. Will continue to monitor closely. No complications

## 2025-03-13 NOTE — PLAN OF CARE
Problem: BIRTH - VAGINAL/ SECTION  Goal: Fetal and maternal status remain reassuring during the birth process  Description: INTERVENTIONS:  - Monitor vital signs  - Monitor fetal heart rate  - Monitor uterine activity  - Monitor labor progression (vaginal delivery)  - DVT prophylaxis  - Antibiotic prophylaxis  Outcome: Progressing  Goal: Emotionally satisfying birthing experience for mother/fetus  Description: Interventions:  - Assess, plan, implement and evaluate the nursing care given to the patient in labor  - Advocate the philosophy that each childbirth experience is a unique experience and support the family's chosen level of involvement and control during the labor process   - Actively participate in both the patient's and family's teaching of the birth process  - Consider cultural, Episcopal and age-specific factors and plan care for the patient in labor  Outcome: Progressing     Problem: PAIN - ADULT  Goal: Verbalizes/displays adequate comfort level or baseline comfort level  Description: Interventions:  - Encourage patient to monitor pain and request assistance  - Assess pain using appropriate pain scale  - Administer analgesics based on type and severity of pain and evaluate response  - Implement non-pharmacological measures as appropriate and evaluate response  - Consider cultural and social influences on pain and pain management  - Notify physician/advanced practitioner if interventions unsuccessful or patient reports new pain  Outcome: Progressing     Problem: INFECTION - ADULT  Goal: Absence or prevention of progression during hospitalization  Description: INTERVENTIONS:  - Assess and monitor for signs and symptoms of infection  - Monitor lab/diagnostic results  - Monitor all insertion sites, i.e. indwelling lines, tubes, and drains  - Monitor endotracheal if appropriate and nasal secretions for changes in amount and color  - [place] appropriate cooling/warming therapies per order  -  Administer medications as ordered  - Instruct and encourage patient and family to use good hand hygiene technique  - Identify and instruct in appropriate isolation precautions for identified infection/condition  Outcome: Progressing  Goal: Absence of fever/infection during neutropenic period  Description: INTERVENTIONS:  - Monitor WBC    Outcome: Progressing     Problem: SAFETY ADULT  Goal: Patient will remain free of falls  Description: INTERVENTIONS:  - Educate patient/family on patient safety including physical limitations  - Instruct patient to call for assistance with activity   - Consult OT/PT to assist with strengthening/mobility   - Keep Call bell within reach  - Keep bed low and locked with side rails adjusted as appropriate  - Keep care items and personal belongings within reach  - Initiate and maintain comfort rounds  - Make Fall Risk Sign visible to staff  - Offer Toileting every 2 Hours, in advance of need  - Consider moving patient to room near nurses station  Outcome: Progressing  Goal: Maintain or return to baseline ADL function  Description: INTERVENTIONS:  -  Assess patient's ability to carry out ADLs; assess patient's baseline for ADL function and identify physical deficits which impact ability to perform ADLs (bathing, care of mouth/teeth, toileting, grooming, dressing, etc.)  - Assess/evaluate cause of self-care deficits   - Assess range of motion  - Assess patient's mobility; develop plan if impaired  - Assess patient's need for assistive devices and provide as appropriate  - Encourage maximum independence but intervene and supervise when necessary  - Involve family in performance of ADLs  - Assess for home care needs following discharge   - Consider OT consult to assist with ADL evaluation and planning for discharge  - Provide patient education as appropriate  Outcome: Progressing  Goal: Maintains/Returns to pre admission functional level  Description: INTERVENTIONS:  - Perform AM-PAC 6 Click  Basic Mobility/ Daily Activity assessment daily.  - Set and communicate daily mobility goal to care team and patient/family/caregiver.   - Collaborate with rehabilitation services on mobility goals if consulted  - Out of bed for toileting  - Record patient progress and toleration of activity level   Outcome: Progressing     Problem: Knowledge Deficit  Goal: Patient/family/caregiver demonstrates understanding of disease process, treatment plan, medications, and discharge instructions  Description: Complete learning assessment and assess knowledge base.  Interventions:  - Provide teaching at level of understanding  - Provide teaching via preferred learning methods  Outcome: Progressing     Problem: DISCHARGE PLANNING  Goal: Discharge to home or other facility with appropriate resources  Description: INTERVENTIONS:  - Identify barriers to discharge w/patient and caregiver  - Arrange for needed discharge resources and transportation as appropriate  - Identify discharge learning needs (meds, wound care, etc.)  - Arrange for interpretive services to assist at discharge as needed  - Refer to Case Management Department for coordinating discharge planning if the patient needs post-hospital services based on physician/advanced practitioner order or complex needs related to functional status, cognitive ability, or social support system  Outcome: Progressing     Problem: POSTPARTUM  Goal: Experiences normal postpartum course  Description: INTERVENTIONS:  - Monitor maternal vital signs  - Assess uterine involution and lochia  Outcome: Progressing  Goal: Appropriate maternal -  bonding  Description: INTERVENTIONS:  - Identify family support  - Assess for appropriate maternal/infant bonding   -Encourage maternal/infant bonding opportunities  - Referral to  or  as needed  Outcome: Progressing  Goal: Establishment of infant feeding pattern  Description: INTERVENTIONS:  - Assess breast/bottle  feeding  - Refer to lactation as needed  Outcome: Progressing  Goal: Incision(s), wounds(s) or drain site(s) healing without S/S of infection  Description: INTERVENTIONS  - Assess and document dressing, incision, wound bed, drain sites and surrounding tissue  - Provide patient and family education  - Perform skin care/dressing changes every 2  Outcome: Progressing

## 2025-03-13 NOTE — OP NOTE
OPERATIVE REPORT  PATIENT NAME: Dilcia Kilgore    :  1995  MRN: [id number]  Pt Location: [facility] L[facility]D OR ROOM 02    SURGERY DATE: 3/13/2025    Surgeons and Role:     * Kelin Regalado MD - Primary     * Irene Blair MD - Assisting    Preop Diagnosis:  Previous  section complicating pregnancy, with delivery [O34.219]  Breech presentation of fetus    Post-Op Diagnosis Codes:     * Previous  section complicating pregnancy, with delivery [O34.219]    Procedure(s) (LRB):   SECTION () REPEAT (N/A)    Specimen(s):  ID Type Source Tests Collected by Time Destination   A :  Tissue (Placenta on Hold) OB Only Placenta PLACENTA IN STORAGE Kelin Regalado MD 3/13/2025 1353    B :  Cord Blood Cord BLOOD GAS, VENOUS, CORD, BLOOD GAS, ARTERIAL, CORD Kelin Regalado MD 3/13/2025 1350      Surgical QBL:  Surgical QBL (mL): 294 mL    Drains:  * No LDAs found *    Anesthesia Type:   Spinal    Operative Indications:  Previous  section complicating pregnancy, with delivery [O34.219]  Breech presentation of fetus     Abdoulaye Group Classification System:  No Multiple pregnancy, No Transverse or oblique lie, Breech lie, Multiparous +  is ABDOULAYE GROUP 7    Operative Findings:  Moderate subcutaneous adhesions  Moderate midline fascial adhesions  Minimal intraperitoneal adhesions  Viable male  at 1349 with APGARs of 8 and 9 at 1 and 5 minutes, respectively. Fetus weight: 9lb 2oz.  Normal intact placenta with centrally inserted 3 vessel cord expressed at 1353.  Normal uterus, bilateral tubes and ovaries.  Fetal cord gases:  Recent Results (from the past 2 hours)   CORD, Blood gas, venous    Collection Time: 25  1:50 PM   Result Value Ref Range    pH, Cord Miguel 7.376 7.190 - 7.490    pCO2, Cord Miguel 39.7 27.0 - 43.0 mm HG    pO2, Cord Miguel 38.1 15.0 - 45.0 mm HG    HCO3, Cord Miguel 22.7 12.2 - 28.6 mmol/L    Base Exc, Cord Miguel -2.2 (L) 1.0 - 9.0 mmol/L     O2 Cont, Cord Miguel 17.2 mL/dL    O2 HGB,VENOUS CORD 84.0 %   CORD, Blood gas, arterial    Collection Time: 03/13/25  1:50 PM   Result Value Ref Range    pH, Cord Art 7.250 7.230 - 7.430    pCO2, Cord Art 55.0 30.0 - 60.0    pO2, Cord Art 16.0 5.0 - 25.0 mm HG    HCO3, Cord Art 23.6 17.3 - 27.3 mmol/L    Base Exc, Cord Art -4.4 (L) 3.0 - 11.0 mmol/L    O2 Content, Cord Art 6.1 ml/dl    O2 Hgb, Arterial Cord 30.4 %     Procedure and technique:  The patient was taken to the operating room. Spinal anesthesia was adequately established and Ancef was given for preoperative prophylaxis. The patient was then placed in the dorsal supine position with a left tilt of the hips. The patient was prepped with chlorhexidine for vaginal prep and chloraprep for abdominal prep and draped in the usual sterile fashion.    A time out was performed to confirm correct patient and correct procedure. An incision was made in the skin with a surgical scalpel, and sharp dissection was carried out over subsequent layers of tissue including the fascia, followed by the Bovie electrocautery for hemostasis. The fascia was incised at the midline, and the fascial incision was extended bilaterally using the curved Alexander scissors. Small fascial adhesions were taken down using the Alexander scissors.    The superior edge of the fascial incision was grasped with Kocher clamps, tented up, and the underlying rectus muscles were dissected off bluntly and sharply using the scalpel. The rectus muscles were then divided at midline, and the peritoneum was identified and entered and extended superiorly and inferiorly in a blunt fashion. The Sloan O retractor was inserted. A transverse incision was made in the lower uterine segment using a new surgical blade. The uterine incision was extended cephalad and caudal using blunt dissection. The amniotic sac was entered and the amniotic fluid was noted to be clear.    The surgeon's hand was placed into the uterine cavity. The  right foot presented and the right leg was delivered. The fetal buttock was then identified and elevated through the uterine incision. With gentle traction, the fetus was rotated to sacrum anterior position and the body was elevated at 90 degrees. The left leg was then flexed at the hip and knee, externally rotated, and spontaneously delivered. A breech towel was placed over the fetal back, and the fetus was delivered to the level of the scapula. The fetus was turned towards its left and the right arm was delivered across the body with a sweep from the axilla. The baby was then rotated 180 degrees from the hips and the left arm was delivered in similar fashion. The fetal head was identified and elevated through the uterine incision with the assistance of fundal pressure.     There was no nuchal cord noted. On delivery, the cord was doubly clamped and cut after delayed cord clamping. The infant was then passed off the table to awaiting  staff. The  was noted to cry spontaneously and moved all extremities. Venous and arterial blood gas, cord blood, and portion of cord were obtained for analysis and routine blood testing. The placenta was delivered and sent to storage. The placenta was noted to be intact with a centrally inserted three-vessel cord. Oxytocin was administered by IV infusion to enhance uterine contraction. The uterus was cleared of all clots and remaining products of conception.    The uterine incision was re-approximated using 0 Monocryl in a running locked fashion. A second horizontal imbricating stitch with 0 Monocryl was applied. The uterine incision was examined and noted to be hemostatic. The paracolic gutters were cleared of all clots. The uterine incision was once again re-examined and noted to be hemostatic. The Sloan O retractor was removed. The rectus muscles were examined and noted to be hemostatic. The fascia was re-approximated using 0 Vicryl in a running nonlocked fashion.  The subcutaneous tissue was irrigated and cleared of all clots and debris. Good hemostasis was noted with Bovie electrocautery. The subcutaneous tissue was re-approximated with 2-0 plain gut in a running nonlocked fashion. The skin incision was closed with 4-0 Stratafix in a running subcuticular fashion. Good hemostasis was noted. Exofin surgical glue was applied.    Patient tolerated the procedure well. All needle, sponge, and instrument counts were noted to be correct x2 at the end of the procedure. Patient was transferred to the recovery room in stable condition. Attending physician Dr. Regalado was present for the duration of the procedure.    Complications:   None apparent    Patient Disposition:  PACU     SIGNATURE: Irene Blair MD  DATE: March 13, 2025  TIME: 2:43 PM

## 2025-03-13 NOTE — PROGRESS NOTES
" Post Op Check - OB/GYN  Dilcia Kilgore 29 y.o. female MRN: [id number]  Unit/Bed#: -01 Encounter: [id number]    Chief concern: POD#0 s/p repeat  section    SUBJECTIVE:  Dilcia feels well  Pain: well-controlled  Tolerating PO: yes  Voiding: no, Mejia catheter in place  Flatus: no  BM: no  Ambulating: no  Breastfeeding:  yes  Chest pain: no  Shortness of breath: no  Lochia: wnl    OBJECTIVE:  Vitals:   /57 (BP Location: Left arm)   Pulse 64   Temp (!) 97.4 °F (36.3 °C) (Oral) Comment: nurse notified  Resp 18   Ht 5' 5\" (1.651 m)   Wt 108 kg (237 lb)   LMP 2024 (Exact Date)   SpO2 98%   Breastfeeding Yes   BMI 39.44 kg/m²       Intake/Output Summary (Last 24 hours) at 3/13/2025 1737  Last data filed at 3/13/2025 1427  Gross per 24 hour   Intake 2800 ml   Output 394 ml   Net 2406 ml     Invasive Devices       Peripheral Intravenous Line  Duration             Peripheral IV 25 Right;Ventral (anterior) Wrist <1 day                  Physical Exam:   GEN: well-appearing, alert and oriented x3  PULMONARY: lungs CTAB  CARDIAC: normal rate and rhythm, no murmurs  ABDOMEN: soft, nontender, nondistended, fundus firm at the umbilicus, incision c/d/i    Labs:   Admission on 2025   Component Date Value    ABO Grouping 2025 A     Rh Factor 2025 Positive     Antibody Screen 2025 Negative     Specimen Expiration Date [id number]     WBC 2025 10.74 (H)     RBC 2025 4.19     Hemoglobin 2025 10.2 (L)     Hematocrit 2025 32.0 (L)     MCV 2025 76 (L)     MCH 2025 24.3 (L)     MCHC 2025 31.9     RDW 2025 14.1     Platelets 2025 270     MPV 2025 10.4     Extra Tube 2025 Hold for add-ons.     pH, Cord Miguel 2025 7.376     pCO2, Cord Miguel 2025 39.7     pO2, Cord Miguel 2025 38.1     HCO3, Cord Miguel 2025 22.7     Base Exc, Cord Miguel 2025 -2.2 (L)     O2 Cont, Cord Miguel " 2025 17.2     O2 HGB,VENOUS CORD 2025 84.0     pH, Cord Art 2025 7.250     pCO2, Cord Art 2025 55.0     pO2, Cord Art 2025 16.0     HCO3, Cord Art 2025 23.6     Base Exc, Cord Art 2025 -4.4 (L)     O2 Content, Cord Art 2025 6.1     O2 Hgb, Arterial Cord 2025 30.4      ASSESSMENT:  #S/p repeat  section - postop day 0, stable    PLAN:  Hgb 10.2g/dL --> f/u AM CBC   Mejia in place, urinary output 0.46cc/kg/hr over past 3hrs   Advance diet as tolerated   Encourage ambulation   Encourage breastfeeding   Anticipate discharge POD2-4    Irene Blair MD  OBGYN PGY-1  25  5:37 PM

## 2025-03-14 LAB
ERYTHROCYTE [DISTWIDTH] IN BLOOD BY AUTOMATED COUNT: 14 % (ref 11.6–15.1)
HCT VFR BLD AUTO: 29.3 % (ref 34.8–46.1)
HGB BLD-MCNC: 9.3 G/DL (ref 11.5–15.4)
MCH RBC QN AUTO: 24.9 PG (ref 26.8–34.3)
MCHC RBC AUTO-ENTMCNC: 31.7 G/DL (ref 31.4–37.4)
MCV RBC AUTO: 79 FL (ref 82–98)
PLATELET # BLD AUTO: 224 THOUSANDS/UL (ref 149–390)
PMV BLD AUTO: 10.3 FL (ref 8.9–12.7)
RBC # BLD AUTO: 3.73 MILLION/UL (ref 3.81–5.12)
TREPONEMA PALLIDUM IGG+IGM AB [PRESENCE] IN SERUM OR PLASMA BY IMMUNOASSAY: NORMAL
WBC # BLD AUTO: 9.64 THOUSAND/UL (ref 4.31–10.16)

## 2025-03-14 PROCEDURE — 99024 POSTOP FOLLOW-UP VISIT: CPT | Performed by: STUDENT IN AN ORGANIZED HEALTH CARE EDUCATION/TRAINING PROGRAM

## 2025-03-14 PROCEDURE — 99254 IP/OBS CNSLTJ NEW/EST MOD 60: CPT

## 2025-03-14 PROCEDURE — 85027 COMPLETE CBC AUTOMATED: CPT

## 2025-03-14 RX ORDER — IBUPROFEN 600 MG/1
600 TABLET, FILM COATED ORAL EVERY 6 HOURS PRN
Status: DISCONTINUED | OUTPATIENT
Start: 2025-03-14 | End: 2025-03-15 | Stop reason: HOSPADM

## 2025-03-14 RX ORDER — ACETAMINOPHEN 325 MG/1
650 TABLET ORAL EVERY 6 HOURS SCHEDULED
Status: DISCONTINUED | OUTPATIENT
Start: 2025-03-14 | End: 2025-03-15

## 2025-03-14 RX ORDER — METOCLOPRAMIDE HYDROCHLORIDE 5 MG/ML
10 INJECTION INTRAMUSCULAR; INTRAVENOUS ONCE
Status: COMPLETED | OUTPATIENT
Start: 2025-03-14 | End: 2025-03-14

## 2025-03-14 RX ADMIN — IRON SUCROSE 200 MG: 20 INJECTION, SOLUTION INTRAVENOUS at 15:16

## 2025-03-14 RX ADMIN — ACETAMINOPHEN 650 MG: 325 TABLET, FILM COATED ORAL at 13:50

## 2025-03-14 RX ADMIN — ENOXAPARIN SODIUM 40 MG: 40 INJECTION SUBCUTANEOUS at 09:24

## 2025-03-14 RX ADMIN — ACETAMINOPHEN 650 MG: 325 TABLET, FILM COATED ORAL at 21:20

## 2025-03-14 RX ADMIN — KETOROLAC TROMETHAMINE 30 MG: 30 INJECTION, SOLUTION INTRAMUSCULAR; INTRAVENOUS at 05:19

## 2025-03-14 RX ADMIN — METOCLOPRAMIDE 10 MG: 5 INJECTION, SOLUTION INTRAMUSCULAR; INTRAVENOUS at 10:52

## 2025-03-14 RX ADMIN — ACETAMINOPHEN 650 MG: 325 TABLET, FILM COATED ORAL at 05:17

## 2025-03-14 NOTE — ASSESSMENT & PLAN NOTE
S/P Repeat low-transverse  3/13/2025.  Neuraxial Duramorph:     Methadone and Duramorph Intra-procedure Administrations (last 48 hours) Showing orders from other encounters Morphine (Duramorph) may show additional administrations that are not neuraxial. Review route and comments.      Date/Time Action Medication Dose    25 1314 Given    morphine (PF) (DURAMORPH) injection 0.15 mg           Monitoring to continue for 24 hours post Duramorph administration:  Monitor pain, HR, BP, respirations, level of sedation and oxygen levels as ordered  Continuous pulse oximetry (or capnography) for 24 hours.  Notify Anesthesia/Acute Pain Services for the following:  Persistent pruritis, persistent nausea, or if respiratory rate is less than or equal to 10 breaths per minute or if pain is unrelieved or if patient is excessively sedated or O2 Sat less than 90% or EtCO2 outside the parameters of Respiratory Therapy Capnography/EtCO2 policy  No narcotics / sedatives / sleeping agents not ordered by Anesthesia/Acute Pain Service for 24 hours post duramorph     Multimodal Analgesia:  Tylenol 650 mg every 6 hours scheduled  Toradol 15 mg IV every 6 hours scheduled x 24 hours post delivery  Followed by ibuprofen 600 mg every 6 hours scheduled  Oxycodone 5 mg every 4 hours as needed for moderate pain  Oxycodone 10 mg every 4 hours as needed for severe pain  Narcan as needed for respiratory depression/opioid reversal    Symptom management:  Nalbuphine 5 mg every 3 hours as needed for pruritus  Benadryl 25 mg every 6 hours as needed for itching  Zofran 4 mg IV every 6 hours as needed for nausea    Bowel Regimen:  If utilizing opioids would recommend addition of stimulant laxative to prevent opioid induced constipation

## 2025-03-14 NOTE — CONSULTS
Consultation - Acute Pain   Name: Dilcia Kilgore 29 y.o. female I MRN: [id number]  Unit/Bed#: -01 I Date of Admission: 3/13/2025   Date of Service: 3/14/2025 I Hospital Day: 1   Inpatient consult to Acute Pain Service  Consult performed by: JOSE D Rivas  Consult ordered by: Juanpablo Mazariegos MD        Physician Requesting Evaluation: Kelin Regalado MD   Reason for Evaluation / Principal Problem: duramorph followup    Assessment & Plan  Status post repeat low transverse  section  S/P Repeat low-transverse  3/13/2025.  Neuraxial Duramorph:     Methadone and Duramorph Intra-procedure Administrations (last 48 hours) Showing orders from other encounters Morphine (Duramorph) may show additional administrations that are not neuraxial. Review route and comments.      Date/Time Action Medication Dose    25 1314 Given    morphine (PF) (DURAMORPH) injection 0.15 mg           Monitoring to continue for 24 hours post Duramorph administration:  Monitor pain, HR, BP, respirations, level of sedation and oxygen levels as ordered  Continuous pulse oximetry (or capnography) for 24 hours.  Notify Anesthesia/Acute Pain Services for the following:  Persistent pruritis, persistent nausea, or if respiratory rate is less than or equal to 10 breaths per minute or if pain is unrelieved or if patient is excessively sedated or O2 Sat less than 90% or EtCO2 outside the parameters of Respiratory Therapy Capnography/EtCO2 policy  No narcotics / sedatives / sleeping agents not ordered by Anesthesia/Acute Pain Service for 24 hours post duramorph     Multimodal Analgesia:  Tylenol 650 mg every 6 hours scheduled  Toradol 15 mg IV every 6 hours scheduled x 24 hours post delivery  Followed by ibuprofen 600 mg every 6 hours scheduled  Oxycodone 5 mg every 4 hours as needed for moderate pain  Oxycodone 10 mg every 4 hours as needed for severe pain  Narcan as needed for respiratory depression/opioid  reversal    Symptom management:  Nalbuphine 5 mg every 3 hours as needed for pruritus  Benadryl 25 mg every 6 hours as needed for itching  Zofran 4 mg IV every 6 hours as needed for nausea    Bowel Regimen:  If utilizing opioids would recommend addition of stimulant laxative to prevent opioid induced constipation          APS will sign off at this time. Thank you for the consult. All opioids and other analgesics to be written at discretion of primary team. Please contact Acute Pain Service - via SecureChat from [phone] with additional questions or concerns. See SecureChat or Amion for additional contacts and after hours information.    History of Present Illness    HPI: Dilcia Kilgore is a 29 y.o. year old female who Is status post  3/13. Patient received duramorph. APS consulted for duramorph follow up.     Patient seen this morning at bedside. Reports abdominal pain has been well controlled postop. Has been ambulating and denies lower extremity weakness/paraesthesias. Tolerating oral intake without nausea/vomiting. Denies pruritus; crocker catheter removed and voiding.     Current pain location(s): Pain Score: 3  Pain Location/Orientation: Location: Incision  Pain Scale: Pain Assessment Tool: 0-10    I have reviewed the patient's controlled substance dispensing history in the Prescription Drug Monitoring Program in compliance with the [facility] regulations before prescribing any controlled substances.     Review of Systems   Gastrointestinal:  Positive for abdominal pain.   All other systems reviewed and are negative.    Medical History Review: I have reviewed the patient's PMH, PSH, Social History, Family History, Meds, and Allergies     Objective :  Temp:  [97.4 °F (36.3 °C)-98.1 °F (36.7 °C)] 98 °F (36.7 °C)  HR:  [] 73  BP: (102-118)/(49-74) 114/56  Resp:  [18] 18  SpO2:  [96 %-100 %] 97 %  O2 Device: None (Room air)    Physical Exam  Vitals reviewed.   Constitutional:       General: She is not in  acute distress.  HENT:      Head: Normocephalic and atraumatic.   Cardiovascular:      Rate and Rhythm: Normal rate.   Pulmonary:      Effort: Pulmonary effort is normal.   Abdominal:      Tenderness: There is abdominal tenderness.   Musculoskeletal:         General: Normal range of motion.      Cervical back: Normal range of motion.   Skin:     General: Skin is dry.   Neurological:      Mental Status: She is alert and oriented to person, place, and time. Mental status is at baseline.          Lab Results: I have reviewed the following results:  CrCl cannot be calculated (Patient's most recent lab result is older than the maximum 7 days allowed.).  Lab Results   Component Value Date    WBC 9.64 03/14/2025    HGB 9.3 (L) 03/14/2025    HCT 29.3 (L) 03/14/2025     03/14/2025         Component Value Date/Time    K 4.0 12/10/2024 1033    K 3.7 09/18/2018 2142     12/10/2024 1033     09/18/2018 2142    CO2 24 12/10/2024 1033    CO2 22 09/18/2018 2142    BUN 7 12/10/2024 1033    BUN 10 09/18/2018 2142    CREATININE 0.52 (L) 12/10/2024 1033    CREATININE 0.8 09/18/2018 2142         Component Value Date/Time    CALCIUM 9.4 12/10/2024 1033    CALCIUM 10.0 09/18/2018 2142    ALKPHOS 64 12/10/2024 1033    ALKPHOS 55 09/18/2018 2142    AST 13 12/10/2024 1033    AST 26 09/18/2018 2142    ALT 9 12/10/2024 1033    ALT 16 09/18/2018 2142    TP 6.3 (L) 12/10/2024 1033    TP 7.4 09/18/2018 2142    ALB 3.5 12/10/2024 1033    ALB 4.3 09/18/2018 2142       Imaging Results Review: No pertinent imaging studies reviewed.  Other Study Results Review: No additional pertinent studies reviewed.

## 2025-03-14 NOTE — PLAN OF CARE
Problem: BIRTH - VAGINAL/ SECTION  Goal: Fetal and maternal status remain reassuring during the birth process  Description: INTERVENTIONS:  - Monitor vital signs  - Monitor fetal heart rate  - Monitor uterine activity  - Monitor labor progression (vaginal delivery)  - DVT prophylaxis  - Antibiotic prophylaxis  Outcome: Progressing  Goal: Emotionally satisfying birthing experience for mother/fetus  Description: Interventions:  - Assess, plan, implement and evaluate the nursing care given to the patient in labor  - Advocate the philosophy that each childbirth experience is a unique experience and support the family's chosen level of involvement and control during the labor process   - Actively participate in both the patient's and family's teaching of the birth process  - Consider cultural, Anglican and age-specific factors and plan care for the patient in labor  Outcome: Progressing     Problem: PAIN - ADULT  Goal: Verbalizes/displays adequate comfort level or baseline comfort level  Description: Interventions:  - Encourage patient to monitor pain and request assistance  - Assess pain using appropriate pain scale  - Administer analgesics based on type and severity of pain and evaluate response  - Implement non-pharmacological measures as appropriate and evaluate response  - Consider cultural and social influences on pain and pain management  - Notify physician/advanced practitioner if interventions unsuccessful or patient reports new pain  Outcome: Progressing     Problem: INFECTION - ADULT  Goal: Absence or prevention of progression during hospitalization  Description: INTERVENTIONS:  - Assess and monitor for signs and symptoms of infection  - Monitor lab/diagnostic results  - Monitor all insertion sites, i.e. indwelling lines, tubes, and drains  - Monitor endotracheal if appropriate and nasal secretions for changes in amount and color  - [place] appropriate cooling/warming therapies per order  -  Administer medications as ordered  - Instruct and encourage patient and family to use good hand hygiene technique  - Identify and instruct in appropriate isolation precautions for identified infection/condition  Outcome: Progressing  Goal: Absence of fever/infection during neutropenic period  Description: INTERVENTIONS:  - Monitor WBC    Outcome: Progressing     Problem: SAFETY ADULT  Goal: Patient will remain free of falls  Description: INTERVENTIONS:  - Educate patient/family on patient safety including physical limitations  - Instruct patient to call for assistance with activity   - Consult OT/PT to assist with strengthening/mobility   - Keep Call bell within reach  - Keep bed low and locked with side rails adjusted as appropriate  - Keep care items and personal belongings within reach  - Initiate and maintain comfort rounds  - Make Fall Risk Sign visible to staff  - Offer Toileting every 2 Hours, in advance of need  - Consider moving patient to room near nurses station  Outcome: Progressing  Goal: Maintain or return to baseline ADL function  Description: INTERVENTIONS:  -  Assess patient's ability to carry out ADLs; assess patient's baseline for ADL function and identify physical deficits which impact ability to perform ADLs (bathing, care of mouth/teeth, toileting, grooming, dressing, etc.)  - Assess/evaluate cause of self-care deficits   - Assess range of motion  - Assess patient's mobility; develop plan if impaired  - Assess patient's need for assistive devices and provide as appropriate  - Encourage maximum independence but intervene and supervise when necessary  - Involve family in performance of ADLs  - Assess for home care needs following discharge   - Consider OT consult to assist with ADL evaluation and planning for discharge  - Provide patient education as appropriate  Outcome: Progressing  Goal: Maintains/Returns to pre admission functional level  Description: INTERVENTIONS:  - Perform AM-PAC 6 Click  Basic Mobility/ Daily Activity assessment daily.  - Set and communicate daily mobility goal to care team and patient/family/caregiver.   - Collaborate with rehabilitation services on mobility goals if consulted  - Out of bed for toileting  - Record patient progress and toleration of activity level   Outcome: Progressing     Problem: Knowledge Deficit  Goal: Patient/family/caregiver demonstrates understanding of disease process, treatment plan, medications, and discharge instructions  Description: Complete learning assessment and assess knowledge base.  Interventions:  - Provide teaching at level of understanding  - Provide teaching via preferred learning methods  Outcome: Progressing     Problem: DISCHARGE PLANNING  Goal: Discharge to home or other facility with appropriate resources  Description: INTERVENTIONS:  - Identify barriers to discharge w/patient and caregiver  - Arrange for needed discharge resources and transportation as appropriate  - Identify discharge learning needs (meds, wound care, etc.)  - Arrange for interpretive services to assist at discharge as needed  - Refer to Case Management Department for coordinating discharge planning if the patient needs post-hospital services based on physician/advanced practitioner order or complex needs related to functional status, cognitive ability, or social support system  Outcome: Progressing     Problem: POSTPARTUM  Goal: Experiences normal postpartum course  Description: INTERVENTIONS:  - Monitor maternal vital signs  - Assess uterine involution and lochia  Outcome: Progressing  Goal: Appropriate maternal -  bonding  Description: INTERVENTIONS:  - Identify family support  - Assess for appropriate maternal/infant bonding   -Encourage maternal/infant bonding opportunities  - Referral to  or  as needed  Outcome: Progressing  Goal: Establishment of infant feeding pattern  Description: INTERVENTIONS:  - Assess breast/bottle  feeding  - Refer to lactation as needed  Outcome: Progressing  Goal: Incision(s), wounds(s) or drain site(s) healing without S/S of infection  Description: INTERVENTIONS  - Assess and document dressing, incision, wound bed, drain sites and surrounding tissue  - Provide patient and family education  - Perform skin care/dressing changes every 2  Outcome: Progressing

## 2025-03-14 NOTE — PLAN OF CARE
Problem: BIRTH - VAGINAL/ SECTION  Goal: Fetal and maternal status remain reassuring during the birth process  Description: INTERVENTIONS:  - Monitor vital signs  - Monitor fetal heart rate  - Monitor uterine activity  - Monitor labor progression (vaginal delivery)  - DVT prophylaxis  - Antibiotic prophylaxis  Outcome: Progressing  Goal: Emotionally satisfying birthing experience for mother/fetus  Description: Interventions:  - Assess, plan, implement and evaluate the nursing care given to the patient in labor  - Advocate the philosophy that each childbirth experience is a unique experience and support the family's chosen level of involvement and control during the labor process   - Actively participate in both the patient's and family's teaching of the birth process  - Consider cultural, [employer]al and age-specific factors and plan care for the patient in labor  Outcome: Progressing     Problem: PAIN - ADULT  Goal: Verbalizes/displays adequate comfort level or baseline comfort level  Description: Interventions:  - Encourage patient to monitor pain and request assistance  - Assess pain using appropriate pain scale  - Administer analgesics based on type and severity of pain and evaluate response  - Implement non-pharmacological measures as appropriate and evaluate response  - Consider cultural and social influences on pain and pain management  - Notify physician/advanced practitioner if interventions unsuccessful or patient reports new pain  Outcome: Progressing     Problem: INFECTION - ADULT  Goal: Absence or prevention of progression during hospitalization  Description: INTERVENTIONS:  - Assess and monitor for signs and symptoms of infection  - Monitor lab/diagnostic results  - Monitor all insertion sites, i.e. indwelling lines, tubes, and drains  - Monitor endotracheal if appropriate and nasal secretions for changes in amount and color  - [place] appropriate cooling/warming therapies per order  -  Administer medications as ordered  - Instruct and encourage patient and family to use good hand hygiene technique  - Identify and instruct in appropriate isolation precautions for identified infection/condition  Outcome: Progressing  Goal: Absence of fever/infection during neutropenic period  Description: INTERVENTIONS:  - Monitor WBC    Outcome: Progressing     Problem: SAFETY ADULT  Goal: Patient will remain free of falls  Description: INTERVENTIONS:  - Educate patient/family on patient safety including physical limitations  - Instruct patient to call for assistance with activity   - Consult OT/PT to assist with strengthening/mobility   - Keep Call bell within reach  - Keep bed low and locked with side rails adjusted as appropriate  - Keep care items and personal belongings within reach  - Initiate and maintain comfort rounds  - Make Fall Risk Sign visible to staff  - Offer Toileting every 2 Hours, in advance of need  - Consider moving patient to room near nurses station  Outcome: Progressing  Goal: Maintain or return to baseline ADL function  Description: INTERVENTIONS:  -  Assess patient's ability to carry out ADLs; assess patient's baseline for ADL function and identify physical deficits which impact ability to perform ADLs (bathing, care of mouth/teeth, toileting, grooming, dressing, etc.)  - Assess/evaluate cause of self-care deficits   - Assess range of motion  - Assess patient's mobility; develop plan if impaired  - Assess patient's need for assistive devices and provide as appropriate  - Encourage maximum independence but intervene and supervise when necessary  - Involve family in performance of ADLs  - Assess for home care needs following discharge   - Consider OT consult to assist with ADL evaluation and planning for discharge  - Provide patient education as appropriate  Outcome: Progressing  Goal: Maintains/Returns to pre admission functional level  Description: INTERVENTIONS:  - Perform AM-PAC 6 Click  Basic Mobility/ Daily Activity assessment daily.  - Set and communicate daily mobility goal to care team and patient/family/caregiver.   - Collaborate with rehabilitation services on mobility goals if consulted  - Out of bed for toileting  - Record patient progress and toleration of activity level   Outcome: Progressing     Problem: Knowledge Deficit  Goal: Patient/family/caregiver demonstrates understanding of disease process, treatment plan, medications, and discharge instructions  Description: Complete learning assessment and assess knowledge base.  Interventions:  - Provide teaching at level of understanding  - Provide teaching via preferred learning methods  Outcome: Progressing     Problem: DISCHARGE PLANNING  Goal: Discharge to home or other facility with appropriate resources  Description: INTERVENTIONS:  - Identify barriers to discharge w/patient and caregiver  - Arrange for needed discharge resources and transportation as appropriate  - Identify discharge learning needs (meds, wound care, etc.)  - Arrange for interpretive services to assist at discharge as needed  - Refer to Case Management Department for coordinating discharge planning if the patient needs post-hospital services based on physician/advanced practitioner order or complex needs related to functional status, cognitive ability, or social support system  Outcome: Progressing     Problem: POSTPARTUM  Goal: Experiences normal postpartum course  Description: INTERVENTIONS:  - Monitor maternal vital signs  - Assess uterine involution and lochia  Outcome: Progressing  Goal: Appropriate maternal -  bonding  Description: INTERVENTIONS:  - Identify family support  - Assess for appropriate maternal/infant bonding   -Encourage maternal/infant bonding opportunities  - Referral to  or  as needed  Outcome: Progressing  Goal: Establishment of infant feeding pattern  Description: INTERVENTIONS:  - Assess breast/bottle  feeding  - Refer to lactation as needed  Outcome: Progressing  Goal: Incision(s), wounds(s) or drain site(s) healing without S/S of infection  Description: INTERVENTIONS  - Assess and document dressing, incision, wound bed, drain sites and surrounding tissue  - Provide patient and family education  - Perform skin care/dressing changes every 2  Outcome: Progressing

## 2025-03-14 NOTE — LACTATION NOTE
This note was copied from a baby's chart.  CONSULT - LACTATION  Baby Boy (Meeta Kilgore 1 days male MRN: [id number]    [facility] N[facility] Room / Bed: (N)/(N) Encounter: [id number]    Maternal Information     MOTHER:  Dilcia Kilgore  Maternal Age: 29 y.o.  OB History: # 1 - Date: , Sex: None, Weight: None, GA: 6w0d, Type: None, Apgar1: None, Apgar5: None, Living: None, Birth Comments: None    # 2 - Date: , Sex: None, Weight: None, GA: 8w0d, Type: None, Apgar1: None, Apgar5: None, Living: None, Birth Comments: None    # 3 - Date: 22, Sex: Female, Weight: 2830 g (6 lb 3.8 oz), GA: 37w3d, Type: , Low Transverse, Apgar1: 8, Apgar5: 9, Living: Living, Birth Comments: failed induction (elevated BP) - dilated to 5 cm - variable FHR    # 4 - Date: 2023, Sex: None, Weight: None, GA: 9w0d, Type: None, Apgar1: None, Apgar5: None, Living: None, Birth Comments: D&E    # 5 - Date: 25, Sex: Male, Weight: 4125 g (9 lb 1.5 oz), GA: 39w0d, Type: , Low Transverse, Apgar1: 8, Apgar5: 9, Living: Living, Birth Comments: None   Previous breast reduction surgery? No    Lactation history:   Has patient previously breast fed: Yes   How long had patient previously breast fed: 6 mo.   Previous breast feeding complications: Low milk supply     Past Surgical History:   Procedure Laterality Date    AL  DELIVERY ONLY N/A 2022    Procedure:  SECTION ();  Surgeon: Kelin Regalado MD;  Location: [facility] LD;  Service: Obstetrics    AL TX MISSED  FIRST TRIMESTER SURGICAL N/A 2023    Procedure: DILATATION AND EVACUATION (D&E) ( 11 weeks);  Surgeon: Jojo Hilario MD;  Location: [facility];  Service: Gynecology    TONSILLECTOMY      WISDOM TOOTH EXTRACTION         Birth information:  YOB: 2023   Time of birth: 1:49 PM   Sex: male   Delivery type: , Low Transverse   Birth Weight: 4125 g (9 lb  "1.5 oz)   Percent of Weight Change: -3%     Gestational Age: 39w0d   [unfilled]    Reason for Consult:    Reason for Consult: Initial assessment (routine) - 10 min    Risk Factors:    Risk Factors:  (LGA)    Breast and nipple assessment:   Breasts/Nipples  Left Breast: Soft  Right Breast: Soft  Left Nipple: Everted  Right Nipple: Everted  Intervention: Hand expression  Breastfeeding Progress: Not yet established, Breastfeeding well (enc. to give both breasts)    OB Lactation Tools:         Breast Pump:    Breast Pump  Pump: Personal (shaan motif)       Assessment: Baby just finished eating and is sleeping s2s with mom    Feeding recommendations:  breast feed on demand. Mom states baby is breastfeeding well. However, mom is worried about her milk supply since she \"lost\" her milk supply at 6 months with her first child.     Ed. On how to est. Milk supply.     Demonstrated hand expression. Enc. To HE prior to each feeding.     Ed. On offering both breasts at every feeding. Ed. On starting on the breast the baby ended the feeding on.     Baby is going to have a bath. Enc. To call lactation to see a latch.     Milk Supply:   - Allow for non-nutritive suck at the breast to stimulate supply   - Allow for skin to skin during and after each breastfeeding session   - Use massage, heat, and hand expression prior to feedings to assist with deep latch   - Increase pumping sessions and pump after every feeding    Information on hand expression given. Discussed benefits of knowing how to manually express breast including stimulating milk supply, softening nipple for latch and evacuating breast in the event of engorgement.    Mom is encouraged to place baby skin to skin for feedings. Skin to skin education provided for baby placement on mother's chest, baby only in diaper, blankets below shoulders on baby's back. Skin to skin is encouraged to continue at home for feedings and between feedings.    Nurse on demand: when " "baby gives hunger cues; when your breasts feel full, or at least every 3 hours during the day and every 5 hours at night counting from the beginning of one feeding to the beginning of the next; which ever comes first. When sucking and swallowing slow, gently compress the breast to restart flow. If active suck-swallow does not restart, gently remove the baby and offer the other breast; offering up to \"four\" breasts per feeding.    Reviewed RSB    Mom has a Cheryl motif hands free from ins.     Christy Hume, MA 3/14/2025 11:04 AM  "

## 2025-03-14 NOTE — PROGRESS NOTES
Progress Note - OB/GYN   Name: Dilcia Kilgore 29 y.o. female I MRN: [id number]  Unit/Bed#: -01 I Date of Admission: 3/13/2025   Date of Service: 3/14/2025 I Hospital Day: 1       Assessment and Plan   Dilcia Kilgore is a patient of: [facility] . She is POD#1 s/p repeat  section, low transverse incision. Recovering well and stable.    Disposition   - Anticipate discharge home on POD# 2-4  Assessment & Plan  Status post repeat low transverse  section  QBL: 294cc; admission Hb 10.2g/dL -> 9.3, Venofer ordered. Patient progressing toward postoperative milestones  - Continue routine postoperative care  - Pain management with oral analgesics  - DVT Ppx: Lovenox 40mg BID POD1; encourage ambulation  - Passed void trial  - Encourage breastfeeding, familial bonding    OB Post-Partum Progress Note  Chief Concern: POD#1 s/p repeat  section, low transverse incision  Subjective:    Dilcia Kilgore has no current concerns. Pain is well controlled. She is currently voiding. She is not currently passing flatus. She is ambulating. She is tolerating PO and denies nausea or vomitting. She denies chest pain, shortness of breath, lightheadedness. Lochia is normal. She is breastfeeding.    Objective :  Temp:  [97.4 °F (36.3 °C)-98.1 °F (36.7 °C)] 97.7 °F (36.5 °C)  HR:  [] 76  BP: (102-118)/(49-74) 115/54  Resp:  [18] 18  SpO2:  [96 %-100 %] 96 %  O2 Device: None (Room air)    Physical Exam  GEN: well-appearing, alert and oriented x3  CARDIO: regular rate  RESP: nonlabored respirations on room air  ABDOMEN: soft, nontender, nodistended, fundus firm below the umbilicus, incision clean/dry/intact    Labs:   Hemoglobin   Date Value Ref Range Status   2025 9.3 (L) 11.5 - 15.4 g/dL Final   2025 10.2 (L) 11.5 - 15.4 g/dL Final     WBC   Date Value Ref Range Status   2025 9.64 4.31 - 10.16 Thousand/uL Final   2025 10.74 (H) 4.31 - 10.16 Thousand/uL Final     Platelets    Date Value Ref Range Status   03/14/2025 224 149 - 390 Thousands/uL Final   03/13/2025 270 149 - 390 Thousands/uL Final     Creatinine   Date Value Ref Range Status   12/10/2024 0.52 (L) 0.60 - 1.30 mg/dL Final     Comment:     Standardized to IDMS reference method   10/09/2024 0.59 (L) 0.60 - 1.30 mg/dL Final     Comment:     Standardized to IDMS reference method   09/18/2018 0.8 0.5 - 1.0 mg/dL Final     Comment:     GFR should be used to assess renal function.  Plasma/Serum creatinine may not   be able to properly reflect renal function in some cases.   09/11/2018 0.8 0.5 - 1.0 mg/dL Final     Comment:     GFR should be used to assess renal function.  Plasma/Serum creatinine may not   be able to properly reflect renal function in some cases.     AST   Date Value Ref Range Status   12/10/2024 13 13 - 39 U/L Final   08/23/2024 12 (L) 13 - 39 U/L Final   09/18/2018 26 10 - 35 U/L Final     Comment:     RESULT MAY BE FALSELY ELEVATED DUE TO HEMOLYSIS   07/11/2018 21 10 - 35 U/L Final     ALT   Date Value Ref Range Status   12/10/2024 9 7 - 52 U/L Final     Comment:     Specimen collection should occur prior to Sulfasalazine administration due to the potential for falsely depressed results.    08/23/2024 9 7 - 52 U/L Final     Comment:     Specimen collection should occur prior to Sulfasalazine administration due to the potential for falsely depressed results.    09/18/2018 16 10 - 35 U/L Final   07/11/2018 15 10 - 35 U/L Final          Irene Blair MD  OBGYN PGY-1  03/14/25  6:08 AM

## 2025-03-15 VITALS
OXYGEN SATURATION: 98 % | HEART RATE: 87 BPM | DIASTOLIC BLOOD PRESSURE: 69 MMHG | BODY MASS INDEX: 39.49 KG/M2 | RESPIRATION RATE: 18 BRPM | WEIGHT: 237 LBS | HEIGHT: 65 IN | TEMPERATURE: 98.4 F | SYSTOLIC BLOOD PRESSURE: 129 MMHG

## 2025-03-15 PROCEDURE — NC001 PR NO CHARGE: Performed by: STUDENT IN AN ORGANIZED HEALTH CARE EDUCATION/TRAINING PROGRAM

## 2025-03-15 PROCEDURE — 99024 POSTOP FOLLOW-UP VISIT: CPT | Performed by: STUDENT IN AN ORGANIZED HEALTH CARE EDUCATION/TRAINING PROGRAM

## 2025-03-15 RX ORDER — OXYCODONE HYDROCHLORIDE 5 MG/1
5 TABLET ORAL EVERY 4 HOURS PRN
Status: DISCONTINUED | OUTPATIENT
Start: 2025-03-15 | End: 2025-03-15 | Stop reason: HOSPADM

## 2025-03-15 RX ORDER — BENZOCAINE/MENTHOL 6 MG-10 MG
1 LOZENGE MUCOUS MEMBRANE DAILY PRN
Status: CANCELLED
Start: 2025-03-15

## 2025-03-15 RX ORDER — POLYETHYLENE GLYCOL 3350 17 G/17G
17 POWDER, FOR SOLUTION ORAL DAILY PRN
Status: DISCONTINUED | OUTPATIENT
Start: 2025-03-15 | End: 2025-03-15 | Stop reason: HOSPADM

## 2025-03-15 RX ORDER — ONDANSETRON 2 MG/ML
4 INJECTION INTRAMUSCULAR; INTRAVENOUS EVERY 8 HOURS PRN
Status: DISCONTINUED | OUTPATIENT
Start: 2025-03-15 | End: 2025-03-15 | Stop reason: HOSPADM

## 2025-03-15 RX ORDER — ACETAMINOPHEN 325 MG/1
650 TABLET ORAL EVERY 6 HOURS SCHEDULED
Status: DISCONTINUED | OUTPATIENT
Start: 2025-03-15 | End: 2025-03-15 | Stop reason: HOSPADM

## 2025-03-15 RX ORDER — ACETAMINOPHEN 325 MG/1
650 TABLET ORAL EVERY 6 HOURS PRN
Qty: 30 TABLET | Refills: 0 | Status: SHIPPED | OUTPATIENT
Start: 2025-03-15

## 2025-03-15 RX ORDER — ACETAMINOPHEN 325 MG/1
650 TABLET ORAL EVERY 6 HOURS SCHEDULED
Qty: 30 TABLET | Refills: 0 | Status: CANCELLED | OUTPATIENT
Start: 2025-03-15

## 2025-03-15 RX ORDER — OXYCODONE HYDROCHLORIDE 5 MG/1
5 TABLET ORAL EVERY 4 HOURS PRN
Qty: 10 TABLET | Refills: 0 | Status: SHIPPED | OUTPATIENT
Start: 2025-03-15 | End: 2025-03-19

## 2025-03-15 RX ORDER — OXYCODONE HYDROCHLORIDE 10 MG/1
10 TABLET ORAL EVERY 4 HOURS PRN
Status: DISCONTINUED | OUTPATIENT
Start: 2025-03-15 | End: 2025-03-15 | Stop reason: HOSPADM

## 2025-03-15 RX ORDER — IBUPROFEN 600 MG/1
600 TABLET, FILM COATED ORAL EVERY 6 HOURS PRN
Qty: 30 TABLET | Refills: 0 | Status: SHIPPED | OUTPATIENT
Start: 2025-03-15

## 2025-03-15 RX ADMIN — ENOXAPARIN SODIUM 40 MG: 40 INJECTION SUBCUTANEOUS at 08:25

## 2025-03-15 RX ADMIN — IBUPROFEN 600 MG: 600 TABLET ORAL at 14:11

## 2025-03-15 RX ADMIN — POLYETHYLENE GLYCOL 3350 17 G: 17 POWDER, FOR SOLUTION ORAL at 09:47

## 2025-03-15 RX ADMIN — ACETAMINOPHEN 650 MG: 325 TABLET, FILM COATED ORAL at 08:25

## 2025-03-15 RX ADMIN — ACETAMINOPHEN 650 MG: 325 TABLET, FILM COATED ORAL at 02:30

## 2025-03-15 RX ADMIN — ACETAMINOPHEN 650 MG: 325 TABLET, FILM COATED ORAL at 14:11

## 2025-03-15 NOTE — PLAN OF CARE
Problem: BIRTH - VAGINAL/ SECTION  Goal: Fetal and maternal status remain reassuring during the birth process  Description: INTERVENTIONS:  - Monitor vital signs  - Monitor fetal heart rate  - Monitor uterine activity  - Monitor labor progression (vaginal delivery)  - DVT prophylaxis  - Antibiotic prophylaxis  Outcome: Progressing  Goal: Emotionally satisfying birthing experience for mother/fetus  Description: Interventions:  - Assess, plan, implement and evaluate the nursing care given to the patient in labor  - Advocate the philosophy that each childbirth experience is a unique experience and support the family's chosen level of involvement and control during the labor process   - Actively participate in both the patient's and family's teaching of the birth process  - Consider cultural, [employer] and age-specific factors and plan care for the patient in labor  Outcome: Progressing     Problem: POSTPARTUM  Goal: Experiences normal postpartum course  Description: INTERVENTIONS:  - Monitor maternal vital signs  - Assess uterine involution and lochia  Outcome: Progressing  Goal: Appropriate maternal -  bonding  Description: INTERVENTIONS:  - Identify family support  - Assess for appropriate maternal/infant bonding   -Encourage maternal/infant bonding opportunities  - Referral to  or  as needed  Outcome: Progressing  Goal: Establishment of infant feeding pattern  Description: INTERVENTIONS:  - Assess breast/bottle feeding  - Refer to lactation as needed  Outcome: Progressing  Goal: Incision(s), wounds(s) or drain site(s) healing without S/S of infection  Description: INTERVENTIONS  - Assess and document dressing, incision, wound bed, drain sites and surrounding tissue  - Provide patient and family education  - Perform skin care/dressing changes every 2  Outcome: Progressing     Problem: PAIN - ADULT  Goal: Verbalizes/displays adequate comfort level or baseline comfort  level  Description: Interventions:  - Encourage patient to monitor pain and request assistance  - Assess pain using appropriate pain scale  - Administer analgesics based on type and severity of pain and evaluate response  - Implement non-pharmacological measures as appropriate and evaluate response  - Consider cultural and social influences on pain and pain management  - Notify physician/advanced practitioner if interventions unsuccessful or patient reports new pain  Outcome: Progressing     Problem: INFECTION - ADULT  Goal: Absence or prevention of progression during hospitalization  Description: INTERVENTIONS:  - Assess and monitor for signs and symptoms of infection  - Monitor lab/diagnostic results  - Monitor all insertion sites, i.e. indwelling lines, tubes, and drains  - Monitor endotracheal if appropriate and nasal secretions for changes in amount and color  - [place] appropriate cooling/warming therapies per order  - Administer medications as ordered  - Instruct and encourage patient and family to use good hand hygiene technique  - Identify and instruct in appropriate isolation precautions for identified infection/condition  Outcome: Progressing  Goal: Absence of fever/infection during neutropenic period  Description: INTERVENTIONS:  - Monitor WBC    Outcome: Progressing     Problem: SAFETY ADULT  Goal: Patient will remain free of falls  Description: INTERVENTIONS:  - Educate patient/family on patient safety including physical limitations  - Instruct patient to call for assistance with activity   - Consult OT/PT to assist with strengthening/mobility   - Keep Call bell within reach  - Keep bed low and locked with side rails adjusted as appropriate  - Keep care items and personal belongings within reach  - Initiate and maintain comfort rounds  - Make Fall Risk Sign visible to staff  - Offer Toileting every 2 Hours, in advance of need  - Consider moving patient to room near nurses station  Outcome:  Progressing  Goal: Maintain or return to baseline ADL function  Description: INTERVENTIONS:  -  Assess patient's ability to carry out ADLs; assess patient's baseline for ADL function and identify physical deficits which impact ability to perform ADLs (bathing, care of mouth/teeth, toileting, grooming, dressing, etc.)  - Assess/evaluate cause of self-care deficits   - Assess range of motion  - Assess patient's mobility; develop plan if impaired  - Assess patient's need for assistive devices and provide as appropriate  - Encourage maximum independence but intervene and supervise when necessary  - Involve family in performance of ADLs  - Assess for home care needs following discharge   - Consider OT consult to assist with ADL evaluation and planning for discharge  - Provide patient education as appropriate  Outcome: Progressing  Goal: Maintains/Returns to pre admission functional level  Description: INTERVENTIONS:  - Perform AM-PAC 6 Click Basic Mobility/ Daily Activity assessment daily.  - Set and communicate daily mobility goal to care team and patient/family/caregiver.   - Collaborate with rehabilitation services on mobility goals if consulted  - Out of bed for toileting  - Record patient progress and toleration of activity level   Outcome: Progressing     Problem: Knowledge Deficit  Goal: Patient/family/caregiver demonstrates understanding of disease process, treatment plan, medications, and discharge instructions  Description: Complete learning assessment and assess knowledge base.  Interventions:  - Provide teaching at level of understanding  - Provide teaching via preferred learning methods  Outcome: Progressing

## 2025-03-15 NOTE — PLAN OF CARE
Problem: BIRTH - VAGINAL/ SECTION  Goal: Fetal and maternal status remain reassuring during the birth process  Description: INTERVENTIONS:  - Monitor vital signs  - Monitor fetal heart rate  - Monitor uterine activity  - Monitor labor progression (vaginal delivery)  - DVT prophylaxis  - Antibiotic prophylaxis  Outcome: Progressing  Goal: Emotionally satisfying birthing experience for mother/fetus  Description: Interventions:  - Assess, plan, implement and evaluate the nursing care given to the patient in labor  - Advocate the philosophy that each childbirth experience is a unique experience and support the family's chosen level of involvement and control during the labor process   - Actively participate in both the patient's and family's teaching of the birth process  - Consider cultural, Yazdanism and age-specific factors and plan care for the patient in labor  Outcome: Progressing     Problem: PAIN - ADULT  Goal: Verbalizes/displays adequate comfort level or baseline comfort level  Description: Interventions:  - Encourage patient to monitor pain and request assistance  - Assess pain using appropriate pain scale  - Administer analgesics based on type and severity of pain and evaluate response  - Implement non-pharmacological measures as appropriate and evaluate response  - Consider cultural and social influences on pain and pain management  - Notify physician/advanced practitioner if interventions unsuccessful or patient reports new pain  Outcome: Progressing     Problem: INFECTION - ADULT  Goal: Absence or prevention of progression during hospitalization  Description: INTERVENTIONS:  - Assess and monitor for signs and symptoms of infection  - Monitor lab/diagnostic results  - Monitor all insertion sites, i.e. indwelling lines, tubes, and drains  - Monitor endotracheal if appropriate and nasal secretions for changes in amount and color  - [place] appropriate cooling/warming therapies per order  -  Administer medications as ordered  - Instruct and encourage patient and family to use good hand hygiene technique  - Identify and instruct in appropriate isolation precautions for identified infection/condition  Outcome: Progressing  Goal: Absence of fever/infection during neutropenic period  Description: INTERVENTIONS:  - Monitor WBC    Outcome: Progressing     Problem: SAFETY ADULT  Goal: Patient will remain free of falls  Description: INTERVENTIONS:  - Educate patient/family on patient safety including physical limitations  - Instruct patient to call for assistance with activity   - Consult OT/PT to assist with strengthening/mobility   - Keep Call bell within reach  - Keep bed low and locked with side rails adjusted as appropriate  - Keep care items and personal belongings within reach  - Initiate and maintain comfort rounds  - Make Fall Risk Sign visible to staff  - Offer Toileting every 2 Hours, in advance of need  - Consider moving patient to room near nurses station  Outcome: Progressing  Goal: Maintain or return to baseline ADL function  Description: INTERVENTIONS:  -  Assess patient's ability to carry out ADLs; assess patient's baseline for ADL function and identify physical deficits which impact ability to perform ADLs (bathing, care of mouth/teeth, toileting, grooming, dressing, etc.)  - Assess/evaluate cause of self-care deficits   - Assess range of motion  - Assess patient's mobility; develop plan if impaired  - Assess patient's need for assistive devices and provide as appropriate  - Encourage maximum independence but intervene and supervise when necessary  - Involve family in performance of ADLs  - Assess for home care needs following discharge   - Consider OT consult to assist with ADL evaluation and planning for discharge  - Provide patient education as appropriate  Outcome: Progressing  Goal: Maintains/Returns to pre admission functional level  Description: INTERVENTIONS:  - Perform AM-PAC 6 Click  Basic Mobility/ Daily Activity assessment daily.  - Set and communicate daily mobility goal to care team and patient/family/caregiver.   - Collaborate with rehabilitation services on mobility goals if consulted  - Out of bed for toileting  - Record patient progress and toleration of activity level   Outcome: Progressing     Problem: Knowledge Deficit  Goal: Patient/family/caregiver demonstrates understanding of disease process, treatment plan, medications, and discharge instructions  Description: Complete learning assessment and assess knowledge base.  Interventions:  - Provide teaching at level of understanding  - Provide teaching via preferred learning methods  Outcome: Progressing     Problem: DISCHARGE PLANNING  Goal: Discharge to home or other facility with appropriate resources  Description: INTERVENTIONS:  - Identify barriers to discharge w/patient and caregiver  - Arrange for needed discharge resources and transportation as appropriate  - Identify discharge learning needs (meds, wound care, etc.)  - Arrange for interpretive services to assist at discharge as needed  - Refer to Case Management Department for coordinating discharge planning if the patient needs post-hospital services based on physician/advanced practitioner order or complex needs related to functional status, cognitive ability, or social support system  Outcome: Progressing     Problem: POSTPARTUM  Goal: Experiences normal postpartum course  Description: INTERVENTIONS:  - Monitor maternal vital signs  - Assess uterine involution and lochia  Outcome: Progressing  Goal: Appropriate maternal -  bonding  Description: INTERVENTIONS:  - Identify family support  - Assess for appropriate maternal/infant bonding   -Encourage maternal/infant bonding opportunities  - Referral to  or  as needed  Outcome: Progressing  Goal: Establishment of infant feeding pattern  Description: INTERVENTIONS:  - Assess breast/bottle  feeding  - Refer to lactation as needed  Outcome: Progressing  Goal: Incision(s), wounds(s) or drain site(s) healing without S/S of infection  Description: INTERVENTIONS  - Assess and document dressing, incision, wound bed, drain sites and surrounding tissue  - Provide patient and family education  - Perform skin care/dressing changes every 2  Outcome: Progressing

## 2025-03-15 NOTE — PROGRESS NOTES
Progress Note - OB/GYN   Name: Dilcia Kilgore 29 y.o. female I MRN: [id number]  Unit/Bed#: -01 I Date of Admission: 3/13/2025   Date of Service: 3/15/2025 I Hospital Day: 2       Assessment and Plan   Dilcia Kilgore is a patient of: [facility] . She is POD#2 s/p repeat  section, low transverse incision. Recovering well and stable.    Disposition   - Anticipate discharge home on POD# 2-4  Assessment & Plan  Status post repeat low transverse  section  QBL: 294cc; admission Hb 10.2g/dL -> 9.3, received Venofer. Patient progressing toward postoperative milestones  - Continue routine postoperative care  - Pain management with oral analgesics  - DVT Ppx: Lovenox 40mg BID POD1; encourage ambulation  - Passed void trial  - Encourage breastfeeding, familial bonding  Prior pregnancy complicated by PIH, antepartum, first trimester  Normotensive this pregnancy  Continue to monitor closely  Class 2 obesity due to excess calories without serious comorbidity with body mass index (BMI) of 36.0 to 36.9 in adult  BMI 39  Plan for Lovenox in post-operative period    OB Post-Partum Progress Note  Chief Concern: POD#2 s/p repeat  section, low transverse incision  Subjective:    Dilcia Kilgore has no current concerns. Pain is well controlled. She is currently voiding. She is not currently passing flatus. She is ambulating. She is tolerating PO and denies nausea or vomitting. She denies chest pain, shortness of breath, lightheadedness. Lochia is normal. She is breastfeeding.    Objective :  Temp:  [97.7 °F (36.5 °C)-98 °F (36.7 °C)] 97.7 °F (36.5 °C)  HR:  [66-84] 66  BP: (106-133)/(56-74) 125/60  Resp:  [18-20] 18  SpO2:  [96 %-100 %] 98 %  O2 Device: None (Room air)    GEN: well-appearing, alert and oriented x3  CARDIO: regular rate  RESP: nonlabored respirations on room air  ABDOMEN: soft, nontender, nodistended, fundus firm below the umbilicus, incision clean/dry/intact    Labs:    Hemoglobin   Date Value Ref Range Status   03/14/2025 9.3 (L) 11.5 - 15.4 g/dL Final   03/13/2025 10.2 (L) 11.5 - 15.4 g/dL Final     WBC   Date Value Ref Range Status   03/14/2025 9.64 4.31 - 10.16 Thousand/uL Final   03/13/2025 10.74 (H) 4.31 - 10.16 Thousand/uL Final     Platelets   Date Value Ref Range Status   03/14/2025 224 149 - 390 Thousands/uL Final   03/13/2025 270 149 - 390 Thousands/uL Final     Creatinine   Date Value Ref Range Status   12/10/2024 0.52 (L) 0.60 - 1.30 mg/dL Final     Comment:     Standardized to IDMS reference method   10/09/2024 0.59 (L) 0.60 - 1.30 mg/dL Final     Comment:     Standardized to IDMS reference method   09/18/2018 0.8 0.5 - 1.0 mg/dL Final     Comment:     GFR should be used to assess renal function.  Plasma/Serum creatinine may not   be able to properly reflect renal function in some cases.   09/11/2018 0.8 0.5 - 1.0 mg/dL Final     Comment:     GFR should be used to assess renal function.  Plasma/Serum creatinine may not   be able to properly reflect renal function in some cases.     AST   Date Value Ref Range Status   12/10/2024 13 13 - 39 U/L Final   08/23/2024 12 (L) 13 - 39 U/L Final   09/18/2018 26 10 - 35 U/L Final     Comment:     RESULT MAY BE FALSELY ELEVATED DUE TO HEMOLYSIS   07/11/2018 21 10 - 35 U/L Final     ALT   Date Value Ref Range Status   12/10/2024 9 7 - 52 U/L Final     Comment:     Specimen collection should occur prior to Sulfasalazine administration due to the potential for falsely depressed results.    08/23/2024 9 7 - 52 U/L Final     Comment:     Specimen collection should occur prior to Sulfasalazine administration due to the potential for falsely depressed results.    09/18/2018 16 10 - 35 U/L Final   07/11/2018 15 10 - 35 U/L Final          Caroline Holloway  PGY-1 OB/GYN  03/15/25  7:07 AM

## 2025-03-15 NOTE — ASSESSMENT & PLAN NOTE
QBL: 294cc; admission Hb 10.2g/dL -> 9.3, received Venofer. Patient progressing toward postoperative milestones  - Continue routine postoperative care  - Pain management with oral analgesics  - DVT Ppx: Lovenox 40mg BID POD1; encourage ambulation  - Passed void trial  - Encourage breastfeeding, familial bonding

## 2025-03-17 NOTE — UTILIZATION REVIEW
"Mother and baby discharged 03/15/2025     NOTIFICATION OF INPATIENT ADMISSION   MATERNITY/DELIVERY AUTHORIZATION REQUEST   SERVICING FACILITY:   [facility] - L&D, [place], NICU  [address]. [place] [address]  Tax ID: [id number]  NPI: [id number]   ATTENDING PROVIDER:  Attending Name and NPI#: Kelin Regalado Md [[id number]]  Address: [address] [place] [address]. [place] [address]  Phone: [phone]   ADMISSION INFORMATION:  Place of Service: Inpatient [facility]  Place of Service Code: 21  Inpatient Admission Date/Time: 3/13/25 10:58 AM  Discharge Date/Time: 3/15/2025  3:20 PM  Admitting Diagnosis Code/Description:  Previous  section complicating pregnancy, with delivery [O34.219]  Encounter for  delivery without indication [O82]     Mother: Dilcia Kilgore 1995 Estimated Date of Delivery: 3/20/25  Delivering clinician: Kelin Regalado   OB History          5    Para   2    Term   2            AB   3    Living   2         SAB   3    IAB        Ectopic        Multiple   0    Live Births   2               [place] Name & MRN:   Information for the patient's :  Darren Rose [[id number]]    Delivery Information:  Sex: male  Delivered 3/13/2025 1:49 PM by , Low Transverse; Gestational Age: 39w0d    [place] Measurements:  Weight: 9 lb 1.5 oz (4125 g);  Height: 20\"    APGAR 1 minute 5 minutes 10 minutes   Totals: 8 9       UTILIZATION REVIEW CONTACT:  Brunilda Stanley Utilization   Network Utilization Review Department  Phone: [phone]  Fax [phone]  Email: [email or web address]  Contact for approvals/pending authorizations, clinical reviews, and discharge.     PHYSICIAN ADVISORY SERVICES:  Medical Necessity Denial & Hgdm-ks-Uxld Review  Phone: [phone]  Fax: [phone]  Email: [email or web address]     DISCHARGE SUPPORT TEAM:  For Patients " Discharge Needs & Updates  Phone: [phone] opt. 2 Fax: [phone]  Email: [email or web address]

## 2025-03-19 ENCOUNTER — TELEPHONE (OUTPATIENT)
Dept: OBGYN CLINIC | Facility: CLINIC | Age: 30
End: 2025-03-19

## 2025-03-19 ENCOUNTER — OFFICE VISIT (OUTPATIENT)
Dept: OBGYN CLINIC | Facility: CLINIC | Age: 30
End: 2025-03-19

## 2025-03-19 VITALS — DIASTOLIC BLOOD PRESSURE: 82 MMHG | WEIGHT: 222.6 LBS | BODY MASS INDEX: 37.04 KG/M2 | SYSTOLIC BLOOD PRESSURE: 120 MMHG

## 2025-03-19 DIAGNOSIS — R22.31 MASS OF RIGHT AXILLA: ICD-10-CM

## 2025-03-19 DIAGNOSIS — R21 RASH: ICD-10-CM

## 2025-03-19 LAB — PLACENTA IN STORAGE: NORMAL

## 2025-03-19 RX ORDER — TRIAMCINOLONE ACETONIDE 0.25 MG/G
OINTMENT TOPICAL
Qty: 80 G | Refills: 0 | Status: SHIPPED | OUTPATIENT
Start: 2025-03-19

## 2025-03-19 NOTE — PROGRESS NOTES
Name: Dilcia Kilgore      : 1995      MRN: [id number]  Encounter Provider: Betty Stubbs PA-C  Encounter Date: 3/19/2025   Encounter department: [facility]  :  Assessment & Plan  Encounter for postpartum visit  - incision healing well  - pt aware to abstain from sex at this time until PPD visit in   - [place] score: 0  - Breast Feeding: yes without problems         Mass of right axilla  - Clogged duct verse cyst verse lymphadenopathy  - Dr. Sousa also examined pt. Agrees with US at this time  - no known infections or tick bites  Orders:    US breast axilla right; Future    Rash  - ? Atopic dermatitis  - reviewed with patient to abstain from any soaps with scents/dyes until rash has resolved    Orders:    triamcinolone (KENALOG) 0.025 % ointment; Apply to skin twice a day. Do not use over 2 weeks at a time.        History of Present Illness   HPI  Dilcia Kilgore is a 29 y.o.  delivered Boy (Darren) at 39 week weeks by  without labor on 2025 due to breech presentation.  APGARS of 8 and 9 at 1 and 5 minutes respectively.      Labor and Delivery was complicated by none    She presents today for an incision check.    She is having a rash around her middle. She states it is very itchy. She has not used any new bath products, laundry detergents, new clothing.    She is also complaining of a lump under her right side.  She states that she needed to pump prior to baby eating due to breast milk production.  She believes that is the reason that she has the lump. She thinks it is a clogged duct.  Nursing without difficulty.  Skin is not red. Denies fever or chills or known infections.  Denies H of breast cancer.    Pain: yes - managing with Tylenol and IBU. She has not needed oxycodone.  Tolerating Oral Intake: no  Voiding: no  Flatus: no  Bowel Movement: no  Ambulating: no  Breastfeeding: Yes, no difficulties   Chest Pain:  no  Shortness of Breath: no  Leg Pain/Discomfort: no  Lochia: minimal     Postpartum Depression: Low Risk  (3/13/2025)    [place]  Depression Scale     Last EPDS Total Score: 0     Last EPDS Self Harm Result: Never                       Review of Systems       Objective   /82 (BP Location: Left arm, Patient Position: Sitting, Cuff Size: Standard)   Wt 101 kg (222 lb 9.6 oz)   LMP 2024 (Exact Date)   Breastfeeding Yes   BMI 37.04 kg/m²      Physical Exam  Constitutional:       General: She is not in acute distress.     Appearance: She is not ill-appearing.   HENT:      Head: Normocephalic and atraumatic.      Nose: Nose normal.   Eyes:      General: Lids are normal.      Extraocular Movements: Extraocular movements intact.   Chest:   Breasts:     Right: Normal. No swelling, bleeding, inverted nipple, mass, nipple discharge, skin change or tenderness.      Left: Normal. No swelling, bleeding, inverted nipple, mass, nipple discharge, skin change or tenderness.   Abdominal:      Tenderness: There is no abdominal tenderness.          Comments:  incision c/d/I    Raised maculopapular rash in areas shown above.   Lymphadenopathy:      Upper Body:      Right upper body: Axillary adenopathy (approximately quarter size deep mobile mass, tender to the touch) present. No supraclavicular or pectoral adenopathy.      Left upper body: No supraclavicular, axillary or pectoral adenopathy.   Skin:     General: Skin is cool and dry.   Neurological:      General: No focal deficit present.      Mental Status: She is alert and oriented to person, place, and time.   Psychiatric:         Attention and Perception: Attention normal.         Speech: Speech normal.

## 2025-03-19 NOTE — ASSESSMENT & PLAN NOTE
- Clogged duct verse cyst verse lymphadenopathy  - Dr. Sousa also examined pt. Agrees with US at this time  - no known infections or tick bites  Orders:    US breast axilla right; Future

## 2025-04-01 ENCOUNTER — TELEPHONE (OUTPATIENT)
Dept: OBGYN CLINIC | Facility: CLINIC | Age: 30
End: 2025-04-01

## 2025-04-02 ENCOUNTER — RESULTS FOLLOW-UP (OUTPATIENT)
Dept: OBGYN CLINIC | Facility: CLINIC | Age: 30
End: 2025-04-02

## 2025-04-02 ENCOUNTER — HOSPITAL ENCOUNTER (OUTPATIENT)
Dept: ULTRASOUND IMAGING | Facility: CLINIC | Age: 30
Discharge: HOME | End: 2025-04-02
Payer: COMMERCIAL

## 2025-04-02 DIAGNOSIS — R22.31 MASS OF RIGHT AXILLA: ICD-10-CM

## 2025-04-02 DIAGNOSIS — R22.31 MASS OF RIGHT AXILLA: Primary | ICD-10-CM

## 2025-04-02 PROCEDURE — 76642 ULTRASOUND BREAST LIMITED: CPT

## 2025-04-22 ENCOUNTER — POSTPARTUM VISIT (OUTPATIENT)
Dept: OBGYN CLINIC | Facility: CLINIC | Age: 30
End: 2025-04-22
Payer: COMMERCIAL

## 2025-04-22 VITALS
DIASTOLIC BLOOD PRESSURE: 72 MMHG | WEIGHT: 216 LBS | HEIGHT: 65 IN | BODY MASS INDEX: 35.99 KG/M2 | SYSTOLIC BLOOD PRESSURE: 122 MMHG

## 2025-04-22 NOTE — PROGRESS NOTES
Postpartum Visit  MD Fabricio    25      Cesar Kilgore is a 29 y.o.  female who presents for a postpartum visit.        Term 25 39w0d  4125 g (9 lb 1.5 oz) M CS-LTranv Spinal  Living 8 9    Name: Darren Rose   Location: [facility] (AN L&D)   Delivering Clinician: Kelin Regalado MD        Breastfeeding  Cut back on dairy--using silk    Incision:   Lochia is no bleeding.   Bowel function is normal.   Bladder function is normal.     Patient has not been sexually active.   Desired contraception method is vasectomy (condoms until covered)    Postpartum Depression: Low Risk  (2025)    [place]  Depression Scale     Last EPDS Total Score: 0     Last EPDS Self Harm Result: Never       Gestational Diabetes: no      The following portions of the patient's history were reviewed and updated as appropriate: allergies, current medications, past medical history, past social history, past surgical history, and problem list.      Current Outpatient Medications:     acetaminophen (TYLENOL) 325 mg tablet, Take 2 tablets (650 mg total) by mouth every 6 (six) hours as needed for mild pain, Disp: 30 tablet, Rfl: 0    ibuprofen (MOTRIN) 600 mg tablet, Take 1 tablet (600 mg total) by mouth every 6 (six) hours as needed for mild pain or moderate pain, Disp: 30 tablet, Rfl: 0    Prenatal Vit-Fe Fumarate-FA (PRENATAL VITAMIN PO), Take by mouth, Disp: , Rfl:     magnesium oxide (MAG-OX) 400 mg tablet, , Disp: , Rfl:     triamcinolone (KENALOG) 0.025 % ointment, Apply to skin twice a day. Do not use over 2 weeks at a time. (Patient not taking: Reported on 2025), Disp: 80 g, Rfl: 0    Allergies   Allergen Reactions    Latex Hives and Rash    Adhesive [Medical Tape] Rash     Redness, rash, possible Latex allergy as well. PAPER TAPE ok       Review of Systems  Per HPI    Objective    /72 (BP Location: Left arm, Patient Position: Sitting,  "Cuff Size: Large)   Ht 5' 5\" (1.651 m)   Wt 98 kg (216 lb)   LMP 06/13/2024 (Exact Date)   Breastfeeding Yes   BMI 35.94 kg/m²   Physical Exam  Constitutional:       Appearance: Normal appearance.   Eyes:      Extraocular Movements: Extraocular movements intact.      Conjunctiva/sclera: Conjunctivae normal.   Pulmonary:      Effort: Pulmonary effort is normal.   Abdominal:      General: There is no distension.      Palpations: Abdomen is soft.      Tenderness: There is no abdominal tenderness.      Comments: Incision clean, dry, intact without erythema, induration, bleeding or discharge   Musculoskeletal:         General: Normal range of motion.      Cervical back: Normal range of motion.   Skin:     General: Skin is warm and dry.   Neurological:      General: No focal deficit present.      Mental Status: She is alert.   Psychiatric:         Mood and Affect: Mood normal.         Behavior: Behavior normal.         Thought Content: Thought content normal.           Assessment/Plan:  Dilcia Kilgore is a 29 y.o. who is postpartum from a [facility] with a normal postpartum examination.    1. Contraception: vasectomy  2. Annual exam due in a few months, Last Pap 09/2024: NILM   3. Increase activity as tolerated, may resume all normal activity at 6 weeks    "

## (undated) DEVICE — PACK C-SECTION PBDS

## (undated) DEVICE — SUT PDS II 0 CT-1 36 IN Z346H

## (undated) DEVICE — D + E CONNECTION HOSE

## (undated) DEVICE — Device

## (undated) DEVICE — GLOVE PI ULTRA TOUCH SZ.7.0

## (undated) DEVICE — CURETTE VACURETTE CRVD 8MM

## (undated) DEVICE — COLLECTION SET, DISPOSABLE WITH HANDLE AND TAPERED FITTINGS TUBING, 6 FT (183 CM): Brand: GYRUS ACMI

## (undated) DEVICE — GLOVE PI ULTRA TOUCH SZ.6.5

## (undated) DEVICE — GAUZE SPONGES,16 PLY: Brand: CURITY

## (undated) DEVICE — D + E SAFE TOUCH TISSUE TRAP (CIRCON)

## (undated) DEVICE — STRL ALLENTOWN HYSTEROSCOPY PK: Brand: CARDINAL HEALTH

## (undated) DEVICE — SUT MONOCRYL 0 CTX 36 IN Y398H

## (undated) DEVICE — GLOVE INDICATOR PI UNDERGLOVE SZ 7 BLUE

## (undated) DEVICE — SUT VICRYL 0 CTX 36 IN J978H

## (undated) DEVICE — TELFA NON-ADHERENT ABSORBENT DRESSING: Brand: TELFA

## (undated) DEVICE — ADHESIVE SKIN HIGH VISCOSITY EXOFIN 1ML

## (undated) DEVICE — SUT PLAIN 2-0 CTX 27 IN 872H

## (undated) DEVICE — SKIN MARKER DUAL TIP WITH RULER CAP, FLEXIBLE RULER AND LABELS: Brand: DEVON

## (undated) DEVICE — ABDOMINAL PAD: Brand: DERMACEA

## (undated) DEVICE — GLOVE INDICATOR PI UNDERGLOVE SZ 7.5 BLUE

## (undated) DEVICE — D + E SUCTION CANISTER

## (undated) DEVICE — SUT VICRYL 2-0 CT-1 27 IN J259H

## (undated) DEVICE — CHLORAPREP HI-LITE 26ML ORANGE

## (undated) DEVICE — CHLORHEXIDINE 4PCT 4 OZ

## (undated) DEVICE — PREMIUM DRY TRAY LF: Brand: MEDLINE INDUSTRIES, INC.

## (undated) DEVICE — SUT MONOCRYL 4-0 PS-2 27 IN Y426H

## (undated) DEVICE — SUT VICRYL 0 CT-1 36 IN J946H

## (undated) DEVICE — STERILE SURGICAL LUBRICANT,  TUBE: Brand: SURGILUBE